# Patient Record
Sex: MALE | Race: OTHER | HISPANIC OR LATINO | URBAN - METROPOLITAN AREA
[De-identification: names, ages, dates, MRNs, and addresses within clinical notes are randomized per-mention and may not be internally consistent; named-entity substitution may affect disease eponyms.]

---

## 2016-03-23 RX ORDER — CHOLECALCIFEROL (VITAMIN D3) 125 MCG
1000 CAPSULE ORAL
Qty: 0 | Refills: 0 | DISCHARGE
Start: 2016-03-23

## 2017-02-13 ENCOUNTER — OUTPATIENT (OUTPATIENT)
Dept: OUTPATIENT SERVICES | Facility: HOSPITAL | Age: 82
LOS: 1 days | Discharge: HOME | End: 2017-02-13

## 2017-02-13 DIAGNOSIS — Z98.89 OTHER SPECIFIED POSTPROCEDURAL STATES: Chronic | ICD-10-CM

## 2017-02-13 DIAGNOSIS — C85.90 NON-HODGKIN LYMPHOMA, UNSPECIFIED, UNSPECIFIED SITE: ICD-10-CM

## 2017-02-14 ENCOUNTER — APPOINTMENT (OUTPATIENT)
Dept: HEMATOLOGY ONCOLOGY | Facility: CLINIC | Age: 82
End: 2017-02-14

## 2017-03-01 ENCOUNTER — APPOINTMENT (OUTPATIENT)
Dept: PULMONOLOGY | Facility: CLINIC | Age: 82
End: 2017-03-01

## 2017-03-10 ENCOUNTER — OUTPATIENT (OUTPATIENT)
Dept: OUTPATIENT SERVICES | Facility: HOSPITAL | Age: 82
LOS: 1 days | Discharge: HOME | End: 2017-03-10

## 2017-03-10 DIAGNOSIS — Z98.89 OTHER SPECIFIED POSTPROCEDURAL STATES: Chronic | ICD-10-CM

## 2017-03-10 DIAGNOSIS — C85.90 NON-HODGKIN LYMPHOMA, UNSPECIFIED, UNSPECIFIED SITE: ICD-10-CM

## 2017-03-31 ENCOUNTER — APPOINTMENT (OUTPATIENT)
Dept: PULMONOLOGY | Facility: CLINIC | Age: 82
End: 2017-03-31

## 2017-03-31 VITALS
OXYGEN SATURATION: 97 % | BODY MASS INDEX: 36.14 KG/M2 | WEIGHT: 204 LBS | SYSTOLIC BLOOD PRESSURE: 124 MMHG | HEIGHT: 63 IN | HEART RATE: 66 BPM | DIASTOLIC BLOOD PRESSURE: 60 MMHG

## 2017-03-31 DIAGNOSIS — E66.9 OBESITY, UNSPECIFIED: ICD-10-CM

## 2017-04-07 ENCOUNTER — OUTPATIENT (OUTPATIENT)
Dept: OUTPATIENT SERVICES | Facility: HOSPITAL | Age: 82
LOS: 1 days | Discharge: HOME | End: 2017-04-07

## 2017-04-07 DIAGNOSIS — C85.10 UNSPECIFIED B-CELL LYMPHOMA, UNSPECIFIED SITE: ICD-10-CM

## 2017-04-07 DIAGNOSIS — C85.90 NON-HODGKIN LYMPHOMA, UNSPECIFIED, UNSPECIFIED SITE: ICD-10-CM

## 2017-04-07 DIAGNOSIS — Z98.89 OTHER SPECIFIED POSTPROCEDURAL STATES: Chronic | ICD-10-CM

## 2017-04-11 ENCOUNTER — RESULT REVIEW (OUTPATIENT)
Age: 82
End: 2017-04-11

## 2017-04-12 ENCOUNTER — APPOINTMENT (OUTPATIENT)
Dept: HEMATOLOGY ONCOLOGY | Facility: CLINIC | Age: 82
End: 2017-04-12

## 2017-04-12 VITALS
WEIGHT: 202.38 LBS | OXYGEN SATURATION: 98 % | BODY MASS INDEX: 35.85 KG/M2 | DIASTOLIC BLOOD PRESSURE: 63 MMHG | HEART RATE: 72 BPM | SYSTOLIC BLOOD PRESSURE: 121 MMHG | TEMPERATURE: 97.9 F | RESPIRATION RATE: 16 BRPM

## 2017-04-12 LAB
HCT VFR BLD CALC: 27.9 % — LOW (ref 39–50)
HGB BLD-MCNC: 9.1 G/DL — LOW (ref 13–17)
MCHC RBC-ENTMCNC: 28.9 PG — SIGNIFICANT CHANGE UP (ref 27–34)
MCHC RBC-ENTMCNC: 32.5 G/DL — SIGNIFICANT CHANGE UP (ref 32–36)
MCV RBC AUTO: 89 FL — SIGNIFICANT CHANGE UP (ref 80–100)
PLATELET # BLD AUTO: 241 K/UL — SIGNIFICANT CHANGE UP (ref 150–400)
RBC # BLD: 3.14 M/UL — LOW (ref 4.2–5.8)
RBC # FLD: 15.6 % — HIGH (ref 10.3–14.5)
WBC # BLD: 8.4 K/UL — SIGNIFICANT CHANGE UP (ref 3.8–10.5)
WBC # FLD AUTO: 8.4 K/UL — SIGNIFICANT CHANGE UP (ref 3.8–10.5)

## 2017-06-10 ENCOUNTER — OUTPATIENT (OUTPATIENT)
Dept: OUTPATIENT SERVICES | Facility: HOSPITAL | Age: 82
LOS: 1 days | End: 2017-06-10
Payer: COMMERCIAL

## 2017-06-10 DIAGNOSIS — G47.33 OBSTRUCTIVE SLEEP APNEA (ADULT) (PEDIATRIC): ICD-10-CM

## 2017-06-10 DIAGNOSIS — Z98.89 OTHER SPECIFIED POSTPROCEDURAL STATES: Chronic | ICD-10-CM

## 2017-06-10 PROCEDURE — 95810 POLYSOM 6/> YRS 4/> PARAM: CPT | Mod: 26

## 2017-06-10 PROCEDURE — 95810 POLYSOM 6/> YRS 4/> PARAM: CPT

## 2017-07-07 ENCOUNTER — OUTPATIENT (OUTPATIENT)
Dept: OUTPATIENT SERVICES | Facility: HOSPITAL | Age: 82
LOS: 1 days | Discharge: HOME | End: 2017-07-07

## 2017-07-07 DIAGNOSIS — C85.90 NON-HODGKIN LYMPHOMA, UNSPECIFIED, UNSPECIFIED SITE: ICD-10-CM

## 2017-07-07 DIAGNOSIS — Z98.89 OTHER SPECIFIED POSTPROCEDURAL STATES: Chronic | ICD-10-CM

## 2017-07-12 ENCOUNTER — APPOINTMENT (OUTPATIENT)
Dept: HEMATOLOGY ONCOLOGY | Facility: CLINIC | Age: 82
End: 2017-07-12

## 2017-07-12 ENCOUNTER — RESULT REVIEW (OUTPATIENT)
Age: 82
End: 2017-07-12

## 2017-07-12 VITALS
HEART RATE: 67 BPM | HEIGHT: 62.99 IN | RESPIRATION RATE: 16 BRPM | OXYGEN SATURATION: 100 % | WEIGHT: 198.42 LBS | SYSTOLIC BLOOD PRESSURE: 153 MMHG | TEMPERATURE: 97.7 F | BODY MASS INDEX: 35.16 KG/M2 | DIASTOLIC BLOOD PRESSURE: 61 MMHG

## 2017-07-12 LAB
ALBUMIN SERPL ELPH-MCNC: 3.7 G/DL
ALP BLD-CCNC: 48 U/L
ALT SERPL-CCNC: 12 U/L
ANION GAP SERPL CALC-SCNC: 14 MMOL/L
AST SERPL-CCNC: 16 U/L
BILIRUB SERPL-MCNC: 0.2 MG/DL
BUN SERPL-MCNC: 42 MG/DL
CALCIUM SERPL-MCNC: 9.3 MG/DL
CHLORIDE SERPL-SCNC: 107 MMOL/L
CHOLEST SERPL-MCNC: 167 MG/DL
CHOLEST/HDLC SERPL: 11.1 RATIO
CO2 SERPL-SCNC: 23 MMOL/L
CREAT SERPL-MCNC: 2.32 MG/DL
GLUCOSE SERPL-MCNC: 165 MG/DL
HCT VFR BLD CALC: 31.1 % — LOW (ref 39–50)
HDLC SERPL-MCNC: 15 MG/DL
HGB BLD-MCNC: 10.1 G/DL — LOW (ref 13–17)
LDLC SERPL CALC-MCNC: 83 MG/DL
MCHC RBC-ENTMCNC: 29.2 PG — SIGNIFICANT CHANGE UP (ref 27–34)
MCHC RBC-ENTMCNC: 32.5 G/DL — SIGNIFICANT CHANGE UP (ref 32–36)
MCV RBC AUTO: 90 FL — SIGNIFICANT CHANGE UP (ref 80–100)
PLATELET # BLD AUTO: 210 K/UL — SIGNIFICANT CHANGE UP (ref 150–400)
POTASSIUM SERPL-SCNC: 4.8 MMOL/L
PROT SERPL-MCNC: 6.6 G/DL
RBC # BLD: 3.46 M/UL — LOW (ref 4.2–5.8)
RBC # FLD: 16 % — HIGH (ref 10.3–14.5)
SODIUM SERPL-SCNC: 144 MMOL/L
TRIGL SERPL-MCNC: 344 MG/DL
WBC # BLD: 4.2 K/UL — SIGNIFICANT CHANGE UP (ref 3.8–10.5)
WBC # FLD AUTO: 4.2 K/UL — SIGNIFICANT CHANGE UP (ref 3.8–10.5)

## 2017-07-12 RX ORDER — APIXABAN 5 MG/1
5 TABLET, FILM COATED ORAL
Qty: 180 | Refills: 0 | Status: DISCONTINUED | COMMUNITY
Start: 2017-01-13

## 2017-10-16 ENCOUNTER — OUTPATIENT (OUTPATIENT)
Dept: OUTPATIENT SERVICES | Facility: HOSPITAL | Age: 82
LOS: 1 days | Discharge: HOME | End: 2017-10-16

## 2017-10-16 DIAGNOSIS — C85.90 NON-HODGKIN LYMPHOMA, UNSPECIFIED, UNSPECIFIED SITE: ICD-10-CM

## 2017-10-16 DIAGNOSIS — Z98.89 OTHER SPECIFIED POSTPROCEDURAL STATES: Chronic | ICD-10-CM

## 2017-10-20 ENCOUNTER — APPOINTMENT (OUTPATIENT)
Dept: HEMATOLOGY ONCOLOGY | Facility: CLINIC | Age: 82
End: 2017-10-20
Payer: MEDICARE

## 2017-11-10 ENCOUNTER — APPOINTMENT (OUTPATIENT)
Dept: HEMATOLOGY ONCOLOGY | Facility: CLINIC | Age: 82
End: 2017-11-10
Payer: MEDICARE

## 2017-11-10 ENCOUNTER — APPOINTMENT (OUTPATIENT)
Dept: SURGICAL ONCOLOGY | Facility: CLINIC | Age: 82
End: 2017-11-10
Payer: MEDICARE

## 2017-11-10 ENCOUNTER — RESULT REVIEW (OUTPATIENT)
Age: 82
End: 2017-11-10

## 2017-11-10 VITALS
DIASTOLIC BLOOD PRESSURE: 72 MMHG | BODY MASS INDEX: 37.11 KG/M2 | SYSTOLIC BLOOD PRESSURE: 140 MMHG | HEART RATE: 61 BPM | TEMPERATURE: 98 F | OXYGEN SATURATION: 99 % | RESPIRATION RATE: 16 BRPM | WEIGHT: 209.44 LBS

## 2017-11-10 LAB
HCT VFR BLD CALC: 28.3 % — LOW (ref 39–50)
HGB BLD-MCNC: 9.7 G/DL — LOW (ref 13–17)
MCHC RBC-ENTMCNC: 32 PG — SIGNIFICANT CHANGE UP (ref 27–34)
MCHC RBC-ENTMCNC: 34.2 G/DL — SIGNIFICANT CHANGE UP (ref 32–36)
MCV RBC AUTO: 93.6 FL — SIGNIFICANT CHANGE UP (ref 80–100)
PLATELET # BLD AUTO: 195 K/UL — SIGNIFICANT CHANGE UP (ref 150–400)
RBC # BLD: 3.03 M/UL — LOW (ref 4.2–5.8)
RBC # FLD: 14.7 % — HIGH (ref 10.3–14.5)
WBC # BLD: 4.7 K/UL — SIGNIFICANT CHANGE UP (ref 3.8–10.5)
WBC # FLD AUTO: 4.7 K/UL — SIGNIFICANT CHANGE UP (ref 3.8–10.5)

## 2017-11-10 PROCEDURE — 99215 OFFICE O/P EST HI 40 MIN: CPT

## 2017-11-10 PROCEDURE — 99205 OFFICE O/P NEW HI 60 MIN: CPT

## 2017-11-13 LAB
ALBUMIN SERPL ELPH-MCNC: 3.6 G/DL
ALP BLD-CCNC: 39 U/L
ALT SERPL-CCNC: 15 U/L
ANION GAP SERPL CALC-SCNC: 11 MMOL/L
AST SERPL-CCNC: 16 U/L
BILIRUB SERPL-MCNC: 0.2 MG/DL
BUN SERPL-MCNC: 64 MG/DL
CALCIUM SERPL-MCNC: 9.1 MG/DL
CHLORIDE SERPL-SCNC: 109 MMOL/L
CHOLEST SERPL-MCNC: 189 MG/DL
CHOLEST/HDLC SERPL: 16 RATIO
CO2 SERPL-SCNC: 23 MMOL/L
CREAT SERPL-MCNC: 3.01 MG/DL
GLUCOSE SERPL-MCNC: 165 MG/DL
HDLC SERPL-MCNC: 12 MG/DL
LDH SERPL-CCNC: 165 U/L
LDLC SERPL CALC-MCNC: 102 MG/DL
POTASSIUM SERPL-SCNC: 5.6 MMOL/L
PROT SERPL-MCNC: 6.3 G/DL
SODIUM SERPL-SCNC: 143 MMOL/L
TRIGL SERPL-MCNC: 374 MG/DL

## 2017-11-14 ENCOUNTER — LABORATORY RESULT (OUTPATIENT)
Age: 82
End: 2017-11-14

## 2017-11-15 ENCOUNTER — APPOINTMENT (OUTPATIENT)
Dept: SURGICAL ONCOLOGY | Facility: CLINIC | Age: 82
End: 2017-11-15
Payer: MEDICARE

## 2017-11-15 VITALS
SYSTOLIC BLOOD PRESSURE: 178 MMHG | WEIGHT: 209 LBS | HEIGHT: 62 IN | BODY MASS INDEX: 38.46 KG/M2 | DIASTOLIC BLOOD PRESSURE: 84 MMHG | RESPIRATION RATE: 16 BRPM | HEART RATE: 60 BPM

## 2017-11-15 PROCEDURE — 11306 SHAVE SKIN LESION 0.6-1.0 CM: CPT

## 2017-11-17 ENCOUNTER — LABORATORY RESULT (OUTPATIENT)
Age: 82
End: 2017-11-17

## 2017-12-11 ENCOUNTER — OUTPATIENT (OUTPATIENT)
Dept: OUTPATIENT SERVICES | Facility: HOSPITAL | Age: 82
LOS: 1 days | Discharge: HOME | End: 2017-12-11

## 2017-12-11 DIAGNOSIS — C85.90 NON-HODGKIN LYMPHOMA, UNSPECIFIED, UNSPECIFIED SITE: ICD-10-CM

## 2017-12-11 DIAGNOSIS — Z98.89 OTHER SPECIFIED POSTPROCEDURAL STATES: Chronic | ICD-10-CM

## 2017-12-15 ENCOUNTER — APPOINTMENT (OUTPATIENT)
Dept: HEMATOLOGY ONCOLOGY | Facility: CLINIC | Age: 82
End: 2017-12-15

## 2018-01-10 ENCOUNTER — APPOINTMENT (OUTPATIENT)
Dept: SURGICAL ONCOLOGY | Facility: CLINIC | Age: 83
End: 2018-01-10
Payer: MEDICARE

## 2018-01-10 ENCOUNTER — APPOINTMENT (OUTPATIENT)
Dept: HEMATOLOGY ONCOLOGY | Facility: CLINIC | Age: 83
End: 2018-01-10
Payer: MEDICARE

## 2018-01-10 ENCOUNTER — RESULT REVIEW (OUTPATIENT)
Age: 83
End: 2018-01-10

## 2018-01-10 VITALS
OXYGEN SATURATION: 99 % | DIASTOLIC BLOOD PRESSURE: 85 MMHG | RESPIRATION RATE: 16 BRPM | TEMPERATURE: 97.7 F | BODY MASS INDEX: 36.58 KG/M2 | HEART RATE: 64 BPM | SYSTOLIC BLOOD PRESSURE: 167 MMHG | WEIGHT: 200 LBS

## 2018-01-10 VITALS
HEART RATE: 62 BPM | OXYGEN SATURATION: 99 % | TEMPERATURE: 97.7 F | BODY MASS INDEX: 33.32 KG/M2 | DIASTOLIC BLOOD PRESSURE: 71 MMHG | SYSTOLIC BLOOD PRESSURE: 162 MMHG | HEIGHT: 65 IN | WEIGHT: 200 LBS

## 2018-01-10 LAB
ALBUMIN SERPL ELPH-MCNC: 3.7 G/DL
ALP BLD-CCNC: 54 U/L
ALT SERPL-CCNC: 16 U/L
ANION GAP SERPL CALC-SCNC: 16 MMOL/L
AST SERPL-CCNC: 14 U/L
BILIRUB SERPL-MCNC: 0.2 MG/DL
BUN SERPL-MCNC: 47 MG/DL
CALCIUM SERPL-MCNC: 9.4 MG/DL
CHLORIDE SERPL-SCNC: 107 MMOL/L
CHOLEST SERPL-MCNC: 186 MG/DL
CHOLEST/HDLC SERPL: 5 RATIO
CO2 SERPL-SCNC: 24 MMOL/L
CREAT SERPL-MCNC: 2.75 MG/DL
GLUCOSE SERPL-MCNC: 170 MG/DL
HCT VFR BLD CALC: 29.8 % — LOW (ref 39–50)
HDLC SERPL-MCNC: 37 MG/DL
HGB BLD-MCNC: 10.2 G/DL — LOW (ref 13–17)
LDH SERPL-CCNC: 180 U/L
LDLC SERPL CALC-MCNC: 121 MG/DL
MCHC RBC-ENTMCNC: 31.8 PG — SIGNIFICANT CHANGE UP (ref 27–34)
MCHC RBC-ENTMCNC: 34.2 G/DL — SIGNIFICANT CHANGE UP (ref 32–36)
MCV RBC AUTO: 93 FL — SIGNIFICANT CHANGE UP (ref 80–100)
PLATELET # BLD AUTO: 198 K/UL — SIGNIFICANT CHANGE UP (ref 150–400)
POTASSIUM SERPL-SCNC: 5.8 MMOL/L
PROT SERPL-MCNC: 6.3 G/DL
RBC # BLD: 3.21 M/UL — LOW (ref 4.2–5.8)
RBC # FLD: 13.8 % — SIGNIFICANT CHANGE UP (ref 10.3–14.5)
SODIUM SERPL-SCNC: 147 MMOL/L
TRIGL SERPL-MCNC: 141 MG/DL
WBC # BLD: 8.6 K/UL — SIGNIFICANT CHANGE UP (ref 3.8–10.5)
WBC # FLD AUTO: 8.6 K/UL — SIGNIFICANT CHANGE UP (ref 3.8–10.5)

## 2018-01-10 PROCEDURE — 99215 OFFICE O/P EST HI 40 MIN: CPT

## 2018-01-10 PROCEDURE — 99213 OFFICE O/P EST LOW 20 MIN: CPT

## 2018-01-10 RX ORDER — FERROUS SULFATE 325(65) MG
325 (65 FE) TABLET ORAL
Refills: 0 | Status: DISCONTINUED | COMMUNITY
End: 2018-01-10

## 2018-01-10 RX ORDER — METHYLPREDNISOLONE 4 MG/1
4 TABLET ORAL
Qty: 21 | Refills: 0 | Status: DISCONTINUED | COMMUNITY
Start: 2017-12-09

## 2018-01-10 RX ORDER — CEFPODOXIME PROXETIL 200 MG/1
200 TABLET, FILM COATED ORAL
Qty: 10 | Refills: 0 | Status: DISCONTINUED | COMMUNITY
Start: 2017-12-09

## 2018-01-10 RX ORDER — CLARITHROMYCIN 250 MG/1
250 TABLET, FILM COATED ORAL
Qty: 14 | Refills: 0 | Status: DISCONTINUED | COMMUNITY
Start: 2017-12-09

## 2018-01-10 RX ORDER — HYDROCORTISONE 2.5% 25 MG/G
2.5 CREAM TOPICAL
Qty: 30 | Refills: 0 | Status: DISCONTINUED | COMMUNITY
Start: 2017-10-27

## 2018-01-22 ENCOUNTER — OUTPATIENT (OUTPATIENT)
Dept: OUTPATIENT SERVICES | Facility: HOSPITAL | Age: 83
LOS: 1 days | End: 2018-01-22
Payer: COMMERCIAL

## 2018-01-22 DIAGNOSIS — C44.329 SQUAMOUS CELL CARCINOMA OF SKIN OF OTHER PARTS OF FACE: ICD-10-CM

## 2018-01-22 DIAGNOSIS — I10 ESSENTIAL (PRIMARY) HYPERTENSION: ICD-10-CM

## 2018-01-22 DIAGNOSIS — Z01.818 ENCOUNTER FOR OTHER PREPROCEDURAL EXAMINATION: ICD-10-CM

## 2018-01-22 DIAGNOSIS — Z98.89 OTHER SPECIFIED POSTPROCEDURAL STATES: Chronic | ICD-10-CM

## 2018-01-22 PROCEDURE — 93010 ELECTROCARDIOGRAM REPORT: CPT | Mod: NC

## 2018-01-22 PROCEDURE — 85027 COMPLETE CBC AUTOMATED: CPT

## 2018-01-22 PROCEDURE — G0463: CPT

## 2018-01-22 PROCEDURE — 36415 COLL VENOUS BLD VENIPUNCTURE: CPT

## 2018-01-22 PROCEDURE — 80048 BASIC METABOLIC PNL TOTAL CA: CPT

## 2018-01-22 PROCEDURE — 93005 ELECTROCARDIOGRAM TRACING: CPT

## 2018-01-23 ENCOUNTER — APPOINTMENT (OUTPATIENT)
Dept: CARDIOLOGY | Facility: CLINIC | Age: 83
End: 2018-01-23

## 2018-01-24 ENCOUNTER — APPOINTMENT (OUTPATIENT)
Dept: CARDIOLOGY | Facility: CLINIC | Age: 83
End: 2018-01-24

## 2018-01-25 ENCOUNTER — APPOINTMENT (OUTPATIENT)
Dept: CARDIOLOGY | Facility: CLINIC | Age: 83
End: 2018-01-25

## 2018-01-26 ENCOUNTER — NON-APPOINTMENT (OUTPATIENT)
Age: 83
End: 2018-01-26

## 2018-01-26 ENCOUNTER — APPOINTMENT (OUTPATIENT)
Dept: CARDIOLOGY | Facility: CLINIC | Age: 83
End: 2018-01-26
Payer: MEDICARE

## 2018-01-26 ENCOUNTER — OUTPATIENT (OUTPATIENT)
Dept: OUTPATIENT SERVICES | Facility: HOSPITAL | Age: 83
LOS: 1 days | End: 2018-01-26
Payer: COMMERCIAL

## 2018-01-26 VITALS
HEART RATE: 55 BPM | OXYGEN SATURATION: 98 % | DIASTOLIC BLOOD PRESSURE: 64 MMHG | HEIGHT: 65 IN | SYSTOLIC BLOOD PRESSURE: 138 MMHG

## 2018-01-26 DIAGNOSIS — Z98.89 OTHER SPECIFIED POSTPROCEDURAL STATES: Chronic | ICD-10-CM

## 2018-01-26 DIAGNOSIS — I35.0 NONRHEUMATIC AORTIC (VALVE) STENOSIS: ICD-10-CM

## 2018-01-26 PROCEDURE — 93306 TTE W/DOPPLER COMPLETE: CPT | Mod: 26

## 2018-01-26 PROCEDURE — 99204 OFFICE O/P NEW MOD 45 MIN: CPT | Mod: 25,Q5

## 2018-01-26 PROCEDURE — 93000 ELECTROCARDIOGRAM COMPLETE: CPT

## 2018-01-26 PROCEDURE — 93306 TTE W/DOPPLER COMPLETE: CPT

## 2018-01-26 RX ORDER — PANTOPRAZOLE 40 MG/1
40 TABLET, DELAYED RELEASE ORAL
Refills: 0 | Status: DISCONTINUED | COMMUNITY
End: 2018-01-26

## 2018-01-30 ENCOUNTER — APPOINTMENT (OUTPATIENT)
Dept: SURGICAL ONCOLOGY | Facility: HOSPITAL | Age: 83
End: 2018-01-30

## 2018-01-30 ENCOUNTER — TRANSCRIPTION ENCOUNTER (OUTPATIENT)
Age: 83
End: 2018-01-30

## 2018-01-30 ENCOUNTER — OUTPATIENT (OUTPATIENT)
Dept: OUTPATIENT SERVICES | Facility: HOSPITAL | Age: 83
LOS: 1 days | End: 2018-01-30
Payer: COMMERCIAL

## 2018-01-30 ENCOUNTER — RESULT REVIEW (OUTPATIENT)
Age: 83
End: 2018-01-30

## 2018-01-30 ENCOUNTER — APPOINTMENT (OUTPATIENT)
Dept: CARDIOLOGY | Facility: CLINIC | Age: 83
End: 2018-01-30

## 2018-01-30 DIAGNOSIS — C44.329 SQUAMOUS CELL CARCINOMA OF SKIN OF OTHER PARTS OF FACE: ICD-10-CM

## 2018-01-30 DIAGNOSIS — Z98.89 OTHER SPECIFIED POSTPROCEDURAL STATES: Chronic | ICD-10-CM

## 2018-01-30 PROCEDURE — 21016 RESECT FACE/SCALP TUM 2 CM/>: CPT

## 2018-01-30 PROCEDURE — 11310 SHAVE SKIN LESION 0.5 CM/<: CPT | Mod: 59

## 2018-01-30 PROCEDURE — 11100: CPT | Mod: XS

## 2018-01-30 PROCEDURE — 88305 TISSUE EXAM BY PATHOLOGIST: CPT

## 2018-01-30 PROCEDURE — 15200 FTH/GFT FR TRNK 20 SQ CM/<: CPT

## 2018-01-30 PROCEDURE — 11101: CPT

## 2018-01-30 PROCEDURE — 14041 TIS TRNFR F/C/C/M/N/A/G/H/F: CPT

## 2018-01-30 PROCEDURE — 88305 TISSUE EXAM BY PATHOLOGIST: CPT | Mod: 26

## 2018-01-30 PROCEDURE — 88331 PATH CONSLTJ SURG 1 BLK 1SPC: CPT | Mod: 26

## 2018-01-30 PROCEDURE — 15201 FTH/GFT FR TRNK EACH ADDL: CPT

## 2018-01-30 PROCEDURE — 21016 RESECT FACE/SCALP TUM 2 CM/>: CPT | Mod: AS

## 2018-01-30 PROCEDURE — 88331 PATH CONSLTJ SURG 1 BLK 1SPC: CPT

## 2018-01-30 PROCEDURE — 11310 SHAVE SKIN LESION 0.5 CM/<: CPT | Mod: AS,59

## 2018-01-31 ENCOUNTER — TRANSCRIPTION ENCOUNTER (OUTPATIENT)
Age: 83
End: 2018-01-31

## 2018-02-09 ENCOUNTER — APPOINTMENT (OUTPATIENT)
Dept: SURGICAL ONCOLOGY | Facility: CLINIC | Age: 83
End: 2018-02-09
Payer: MEDICARE

## 2018-02-09 VITALS
RESPIRATION RATE: 15 BRPM | HEIGHT: 65 IN | OXYGEN SATURATION: 97 % | BODY MASS INDEX: 33.32 KG/M2 | DIASTOLIC BLOOD PRESSURE: 66 MMHG | SYSTOLIC BLOOD PRESSURE: 168 MMHG | WEIGHT: 200 LBS | HEART RATE: 53 BPM

## 2018-02-09 PROCEDURE — 99024 POSTOP FOLLOW-UP VISIT: CPT

## 2018-02-15 ENCOUNTER — APPOINTMENT (OUTPATIENT)
Dept: CARDIOLOGY | Facility: CLINIC | Age: 83
End: 2018-02-15

## 2018-03-07 ENCOUNTER — APPOINTMENT (OUTPATIENT)
Dept: CARDIOLOGY | Facility: CLINIC | Age: 83
End: 2018-03-07
Payer: MEDICARE

## 2018-03-07 ENCOUNTER — NON-APPOINTMENT (OUTPATIENT)
Age: 83
End: 2018-03-07

## 2018-03-07 VITALS
HEART RATE: 51 BPM | OXYGEN SATURATION: 97 % | WEIGHT: 205 LBS | SYSTOLIC BLOOD PRESSURE: 195 MMHG | DIASTOLIC BLOOD PRESSURE: 80 MMHG | BODY MASS INDEX: 34.11 KG/M2

## 2018-03-07 PROCEDURE — 99214 OFFICE O/P EST MOD 30 MIN: CPT | Mod: 25

## 2018-03-07 PROCEDURE — 93000 ELECTROCARDIOGRAM COMPLETE: CPT

## 2018-03-30 ENCOUNTER — APPOINTMENT (OUTPATIENT)
Dept: CARDIOLOGY | Facility: CLINIC | Age: 83
End: 2018-03-30

## 2018-04-03 ENCOUNTER — OUTPATIENT (OUTPATIENT)
Dept: OUTPATIENT SERVICES | Facility: HOSPITAL | Age: 83
LOS: 1 days | End: 2018-04-03
Payer: COMMERCIAL

## 2018-04-03 DIAGNOSIS — Z98.89 OTHER SPECIFIED POSTPROCEDURAL STATES: Chronic | ICD-10-CM

## 2018-04-03 DIAGNOSIS — I44.7 LEFT BUNDLE-BRANCH BLOCK, UNSPECIFIED: ICD-10-CM

## 2018-04-03 PROCEDURE — 93017 CV STRESS TEST TRACING ONLY: CPT

## 2018-04-03 PROCEDURE — 93018 CV STRESS TEST I&R ONLY: CPT

## 2018-04-03 PROCEDURE — 78452 HT MUSCLE IMAGE SPECT MULT: CPT

## 2018-04-03 PROCEDURE — 78452 HT MUSCLE IMAGE SPECT MULT: CPT | Mod: 26

## 2018-04-03 PROCEDURE — 93016 CV STRESS TEST SUPVJ ONLY: CPT

## 2018-04-03 PROCEDURE — A9500: CPT

## 2018-04-17 ENCOUNTER — MEDICATION RENEWAL (OUTPATIENT)
Age: 83
End: 2018-04-17

## 2018-05-11 ENCOUNTER — APPOINTMENT (OUTPATIENT)
Dept: SURGICAL ONCOLOGY | Facility: CLINIC | Age: 83
End: 2018-05-11
Payer: MEDICARE

## 2018-05-11 ENCOUNTER — MEDICATION RENEWAL (OUTPATIENT)
Age: 83
End: 2018-05-11

## 2018-05-11 VITALS
BODY MASS INDEX: 33.32 KG/M2 | DIASTOLIC BLOOD PRESSURE: 55 MMHG | OXYGEN SATURATION: 95 % | HEART RATE: 57 BPM | HEIGHT: 65 IN | WEIGHT: 200 LBS | SYSTOLIC BLOOD PRESSURE: 117 MMHG

## 2018-05-11 PROCEDURE — 99214 OFFICE O/P EST MOD 30 MIN: CPT

## 2018-09-12 ENCOUNTER — APPOINTMENT (OUTPATIENT)
Dept: CARDIOLOGY | Facility: CLINIC | Age: 83
End: 2018-09-12
Payer: MEDICARE

## 2018-09-12 ENCOUNTER — NON-APPOINTMENT (OUTPATIENT)
Age: 83
End: 2018-09-12

## 2018-09-12 VITALS
DIASTOLIC BLOOD PRESSURE: 70 MMHG | BODY MASS INDEX: 33.82 KG/M2 | OXYGEN SATURATION: 99 % | HEIGHT: 65 IN | SYSTOLIC BLOOD PRESSURE: 158 MMHG | HEART RATE: 73 BPM | WEIGHT: 203 LBS

## 2018-09-12 VITALS — SYSTOLIC BLOOD PRESSURE: 158 MMHG | DIASTOLIC BLOOD PRESSURE: 70 MMHG

## 2018-09-12 PROCEDURE — 99213 OFFICE O/P EST LOW 20 MIN: CPT | Mod: 25

## 2018-09-12 PROCEDURE — 93000 ELECTROCARDIOGRAM COMPLETE: CPT

## 2018-11-09 ENCOUNTER — APPOINTMENT (OUTPATIENT)
Dept: SURGICAL ONCOLOGY | Facility: CLINIC | Age: 83
End: 2018-11-09
Payer: MEDICARE

## 2018-11-09 VITALS
WEIGHT: 184 LBS | HEART RATE: 63 BPM | BODY MASS INDEX: 30.66 KG/M2 | DIASTOLIC BLOOD PRESSURE: 64 MMHG | HEIGHT: 65 IN | OXYGEN SATURATION: 100 % | SYSTOLIC BLOOD PRESSURE: 107 MMHG

## 2018-11-09 PROCEDURE — 99215 OFFICE O/P EST HI 40 MIN: CPT

## 2018-11-15 ENCOUNTER — OUTPATIENT (OUTPATIENT)
Dept: OUTPATIENT SERVICES | Facility: HOSPITAL | Age: 83
LOS: 1 days | Discharge: HOME | End: 2018-11-15

## 2018-11-15 DIAGNOSIS — Z98.89 OTHER SPECIFIED POSTPROCEDURAL STATES: Chronic | ICD-10-CM

## 2018-11-15 DIAGNOSIS — C85.90 NON-HODGKIN LYMPHOMA, UNSPECIFIED, UNSPECIFIED SITE: ICD-10-CM

## 2018-11-26 ENCOUNTER — APPOINTMENT (OUTPATIENT)
Dept: HEMATOLOGY ONCOLOGY | Facility: CLINIC | Age: 83
End: 2018-11-26
Payer: MEDICARE

## 2018-11-26 VITALS
OXYGEN SATURATION: 97 % | HEART RATE: 61 BPM | TEMPERATURE: 97.3 F | WEIGHT: 179.45 LBS | DIASTOLIC BLOOD PRESSURE: 63 MMHG | SYSTOLIC BLOOD PRESSURE: 100 MMHG | RESPIRATION RATE: 16 BRPM | BODY MASS INDEX: 29.86 KG/M2

## 2018-11-26 PROCEDURE — 99215 OFFICE O/P EST HI 40 MIN: CPT

## 2018-11-26 RX ORDER — ALLOPURINOL 100 MG/1
100 TABLET ORAL DAILY
Refills: 0 | Status: DISCONTINUED | COMMUNITY
End: 2018-11-26

## 2018-11-26 RX ORDER — LISINOPRIL 5 MG/1
5 TABLET ORAL
Qty: 90 | Refills: 1 | Status: DISCONTINUED | COMMUNITY
Start: 2017-09-18 | End: 2018-11-26

## 2018-11-26 RX ORDER — CALCITRIOL 0.25 UG/1
0.25 CAPSULE, LIQUID FILLED ORAL
Refills: 0 | Status: DISCONTINUED | COMMUNITY
End: 2018-11-26

## 2018-11-26 RX ORDER — GLIPIZIDE 2.5 MG/1
2.5 TABLET, FILM COATED, EXTENDED RELEASE ORAL
Refills: 0 | Status: DISCONTINUED | COMMUNITY
End: 2018-11-26

## 2018-11-26 NOTE — HISTORY OF PRESENT ILLNESS
[Disease:__________________________] : Disease: [unfilled] [Cardiovascular] : Cardiovascular [Constitutional] : Constitutional [ENT] : ENT [Dermatologic] : Dermatologic [Endocrine] : Endocrine [Gastrointestinal] : Gastrointestinal [Genitourinary] : Genitourinary [Gynecologic] : Gynecologic [Infectious] : Infectious [Musculoskeletal] : Musculoskeletal [Neurologic] : Neurologic [Pain] : Pain [Pulmonary] : Pulmonary [Hematologic] : Hematologic [Date: ____________] : Patient's last distress assessment performed on [unfilled]. [0 - No Distress] : Distress Level: 0 [de-identified] : First visit 04/28/2009; He had symptoms of a scaling rash over his extremities and his trunk for more than one year. He has a prior history of significant  type 2 diabetes mellitus, obesity, hypertension, hyperlipidemia and hypercholesterolemia. The patient is monitoring the cholesterol and the lip levels. The blood sugar has generally 120 or below.\par He has been compliant with his medications to control his active problems of type 2 diabetes, hypothyroidism, hypertension and hyperlipidemia [de-identified] : CD 3 + mycosis [FreeTextEntry1] : triamcinolone cream 0.5 and triamcinolone ointment 0.5/ Targretin [de-identified] : The patient was begun on dialysis one month ago due to renal failure due to diabetes and hypertension; He will have an A V fistula placed next week at [facility]; he receives dialysis at [facility] in [place] .\par He has a access port on the right chest.\par He has had follow up by Dr Moore for the squamous carcinoma of the skin; No chemotherapy required for that lesion.\par He had a transient ischemic attack in October 2018 and he was hospitalized at [facility]. He had edema and he had removal of 25 lbs of fluid. He remained in hospital 28 days

## 2018-11-26 NOTE — REVIEW OF SYSTEMS
[Fatigue] : fatigue [Recent Change In Weight] : ~T recent weight change [Negative] : Allergic/Immunologic [Dysuria] : no dysuria [Incontinence] : no incontinence [FreeTextEntry8] : he is making urin

## 2018-11-26 NOTE — ASSESSMENT
[Supportive] : Goals of care discussed with patient: Supportive [Palliative Care Plan] : not applicable at this time [FreeTextEntry1] : Singh Calix is a 83 year=ear old male with end stage renal disease now on dialysis for over one month. He has had better compliance with his medication; according to his son Singh who I have just met, he has not been in compliance with blood pressure medication and insulin for years. Family is now charting his blood sugar and his blood pressure daily.\par He has had follow up for the squamous cell carcinoma of the skin by Dr Moore. The disease appears controlled with no adenopathy.\par He was last seen by me in January 2018 and he did not keep his interval follow up. Dialysis continues and they plan for AV shunt healing maturing over 2 months. No blood testing today. Return in 4 months.

## 2018-11-26 NOTE — REASON FOR VISIT
[Follow-Up Visit] : a follow-up visit for [Lymphoma] : lymphoma [Family Member] : family member [Other: _____] : [unfilled]

## 2019-03-11 ENCOUNTER — APPOINTMENT (OUTPATIENT)
Dept: CARDIOLOGY | Facility: CLINIC | Age: 84
End: 2019-03-11

## 2019-03-12 ENCOUNTER — APPOINTMENT (OUTPATIENT)
Dept: CARDIOLOGY | Facility: CLINIC | Age: 84
End: 2019-03-12

## 2019-03-19 ENCOUNTER — OUTPATIENT (OUTPATIENT)
Dept: OUTPATIENT SERVICES | Facility: HOSPITAL | Age: 84
LOS: 1 days | Discharge: HOME | End: 2019-03-19

## 2019-03-19 DIAGNOSIS — Z98.89 OTHER SPECIFIED POSTPROCEDURAL STATES: Chronic | ICD-10-CM

## 2019-03-19 DIAGNOSIS — C85.90 NON-HODGKIN LYMPHOMA, UNSPECIFIED, UNSPECIFIED SITE: ICD-10-CM

## 2019-03-26 ENCOUNTER — RESULT REVIEW (OUTPATIENT)
Age: 84
End: 2019-03-26

## 2019-03-26 ENCOUNTER — APPOINTMENT (OUTPATIENT)
Dept: HEMATOLOGY ONCOLOGY | Facility: CLINIC | Age: 84
End: 2019-03-26
Payer: MEDICARE

## 2019-03-26 VITALS
TEMPERATURE: 97.7 F | OXYGEN SATURATION: 100 % | HEART RATE: 70 BPM | SYSTOLIC BLOOD PRESSURE: 126 MMHG | WEIGHT: 175.25 LBS | BODY MASS INDEX: 29.17 KG/M2 | DIASTOLIC BLOOD PRESSURE: 69 MMHG | RESPIRATION RATE: 16 BRPM

## 2019-03-26 LAB
BASOPHILS # BLD AUTO: 0 K/UL — SIGNIFICANT CHANGE UP (ref 0–0.2)
BASOPHILS NFR BLD AUTO: 0.1 % — SIGNIFICANT CHANGE UP (ref 0–2)
EOSINOPHIL # BLD AUTO: 0.1 K/UL — SIGNIFICANT CHANGE UP (ref 0–0.5)
EOSINOPHIL NFR BLD AUTO: 1.6 % — SIGNIFICANT CHANGE UP (ref 0–6)
HCT VFR BLD CALC: 36.9 % — LOW (ref 39–50)
HGB BLD-MCNC: 12.3 G/DL — LOW (ref 13–17)
LYMPHOCYTES # BLD AUTO: 1.4 K/UL — SIGNIFICANT CHANGE UP (ref 1–3.3)
LYMPHOCYTES # BLD AUTO: 18.7 % — SIGNIFICANT CHANGE UP (ref 13–44)
MCHC RBC-ENTMCNC: 33.2 G/DL — SIGNIFICANT CHANGE UP (ref 32–36)
MCHC RBC-ENTMCNC: 34.1 PG — HIGH (ref 27–34)
MCV RBC AUTO: 103 FL — HIGH (ref 80–100)
MONOCYTES # BLD AUTO: 0.4 K/UL — SIGNIFICANT CHANGE UP (ref 0–0.9)
MONOCYTES NFR BLD AUTO: 6 % — SIGNIFICANT CHANGE UP (ref 2–14)
NEUTROPHILS # BLD AUTO: 5.3 K/UL — SIGNIFICANT CHANGE UP (ref 1.8–7.4)
NEUTROPHILS NFR BLD AUTO: 73.6 % — SIGNIFICANT CHANGE UP (ref 43–77)
PLATELET # BLD AUTO: 181 K/UL — SIGNIFICANT CHANGE UP (ref 150–400)
RBC # BLD: 3.59 M/UL — LOW (ref 4.2–5.8)
RBC # FLD: 14.2 % — SIGNIFICANT CHANGE UP (ref 10.3–14.5)
WBC # BLD: 7.2 K/UL — SIGNIFICANT CHANGE UP (ref 3.8–10.5)
WBC # FLD AUTO: 7.2 K/UL — SIGNIFICANT CHANGE UP (ref 3.8–10.5)

## 2019-03-26 PROCEDURE — 99215 OFFICE O/P EST HI 40 MIN: CPT

## 2019-03-26 RX ORDER — METOLAZONE 2.5 MG/1
2.5 TABLET ORAL
Qty: 26 | Refills: 0 | Status: DISCONTINUED | COMMUNITY
Start: 2017-08-15 | End: 2019-03-26

## 2019-03-26 RX ORDER — BUMETANIDE 2 MG/1
2 TABLET ORAL DAILY
Refills: 0 | Status: DISCONTINUED | COMMUNITY
End: 2019-03-26

## 2019-03-26 RX ORDER — HYDRALAZINE HYDROCHLORIDE 25 MG/1
25 TABLET ORAL 3 TIMES DAILY
Refills: 0 | Status: DISCONTINUED | COMMUNITY
Start: 2016-08-13 | End: 2019-03-26

## 2019-03-26 RX ORDER — MIRABEGRON 25 MG/1
25 TABLET, FILM COATED, EXTENDED RELEASE ORAL
Refills: 0 | Status: DISCONTINUED | COMMUNITY
End: 2019-03-26

## 2019-03-26 NOTE — REASON FOR VISIT
[Follow-Up Visit] : a follow-up visit for [Blood Count Assessment] : blood count assessment [Lymphoma] : lymphoma [Family Member] : family member [Other: _____] : [unfilled]

## 2019-03-27 LAB
ALBUMIN SERPL ELPH-MCNC: 4 G/DL
ALP BLD-CCNC: 56 U/L
ALT SERPL-CCNC: 18 U/L
ANION GAP SERPL CALC-SCNC: 14 MMOL/L
AST SERPL-CCNC: 20 U/L
BILIRUB SERPL-MCNC: 0.5 MG/DL
BUN SERPL-MCNC: 43 MG/DL
CALCIUM SERPL-MCNC: 9 MG/DL
CHLORIDE SERPL-SCNC: 96 MMOL/L
CO2 SERPL-SCNC: 34 MMOL/L
CREAT SERPL-MCNC: 4.08 MG/DL
GLUCOSE SERPL-MCNC: 250 MG/DL
LDH SERPL-CCNC: 148 U/L
POTASSIUM SERPL-SCNC: 4.8 MMOL/L
PROT SERPL-MCNC: 6.4 G/DL
SODIUM SERPL-SCNC: 143 MMOL/L

## 2019-04-24 ENCOUNTER — APPOINTMENT (OUTPATIENT)
Dept: CARDIOLOGY | Facility: CLINIC | Age: 84
End: 2019-04-24

## 2019-04-26 DIAGNOSIS — E03.9 HYPOTHYROIDISM, UNSPECIFIED: ICD-10-CM

## 2019-04-26 RX ORDER — LEVOTHYROXINE SODIUM 0.07 MG/1
75 TABLET ORAL DAILY
Refills: 0 | Status: ACTIVE | COMMUNITY

## 2019-04-26 RX ORDER — PNV NO.95/FERROUS FUM/FOLIC AC 28MG-0.8MG
1000 TABLET ORAL
Refills: 0 | Status: ACTIVE | COMMUNITY

## 2019-04-29 ENCOUNTER — APPOINTMENT (OUTPATIENT)
Dept: CARDIOLOGY | Facility: CLINIC | Age: 84
End: 2019-04-29

## 2019-04-29 ENCOUNTER — APPOINTMENT (OUTPATIENT)
Dept: CARDIOLOGY | Facility: CLINIC | Age: 84
End: 2019-04-29
Payer: MEDICARE

## 2019-04-29 PROCEDURE — 93306 TTE W/DOPPLER COMPLETE: CPT

## 2019-05-10 ENCOUNTER — APPOINTMENT (OUTPATIENT)
Dept: SURGICAL ONCOLOGY | Facility: CLINIC | Age: 84
End: 2019-05-10

## 2019-05-14 NOTE — PHYSICAL EXAM
[Ambulatory and capable of all self care but unable to carry out any work activities] : Status 2- Ambulatory and capable of all self care but unable to carry out any work activities. Up and about more than 50% of waking hours [Obese] : obese [Normal] : affect appropriate [de-identified] : mild gynecomastia [de-identified] : Over all area of plaquing has diminished. Less erythema.generalized pallor over the trunk; significant regression of lesions. small areas of erythema over his scalp [de-identified] : saddened over loss of wife.

## 2019-05-14 NOTE — HISTORY OF PRESENT ILLNESS
[Disease:__________________________] : Disease: [unfilled] [Cardiovascular] : Cardiovascular [Constitutional] : Constitutional [ENT] : ENT [Dermatologic] : Dermatologic [Endocrine] : Endocrine [Gastrointestinal] : Gastrointestinal [Genitourinary] : Genitourinary [Gynecologic] : Gynecologic [Infectious] : Infectious [Musculoskeletal] : Musculoskeletal [Neurologic] : Neurologic [Pain] : Pain [Pulmonary] : Pulmonary [Hematologic] : Hematologic [Date: ____________] : Patient's last distress assessment performed on [unfilled]. [2 - Distress Level] : Distress Level: 2 [de-identified] : First visit 04/28/2009; He had symptoms of a scaling rash over his extremities and his trunk for more than one year. He has a prior history of significant  type 2 diabetes mellitus, obesity, hypertension, hyperlipidemia and hypercholesterolemia. The patient is monitoring the cholesterol and the lip levels. The blood sugar has generally 120 or below.\par He has been compliant with his medications to control his active problems of type 2 diabetes, hypothyroidism, hypertension and hyperlipidemia [de-identified] : CD 3 + mycosis [FreeTextEntry1] : triamcinolone cream 0.5 and triamcinolone ointment 0.5/ Targretin [de-identified] : The patient was begun on dialysis four months ago due to renal failure due to diabetes and hypertension; He will have an A V fistula placed in December left arm [facility]; he receives dialysis at [facility] in [place] .\par He has had follow up by Dr Moore for the squamous carcinoma of the skin in 2018. No chemotherapy required for that lesion.\par He had a transient ischemic attack in October 2018 and he was hospitalized at [facility]. He had edema and he had removal of 25 lbs of fluid. He remained in hospital 28 days. He has noted some lesion on the top of his scalp

## 2019-05-14 NOTE — ASSESSMENT
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 His functional activity has improved after TIA in October 2018.\par He s doing well and he remains on dialysis. He has been off Targretin for one month.The skin lesion have not worsened off Targretin. He still uses the triamcinolone cream. CBC is acceptable. He is not anemic. \par RTC 4 months.\par I have completed forms for the [employer] [facility]; forms will be faxed for Targretin therapy.

## 2019-07-22 ENCOUNTER — MEDICATION RENEWAL (OUTPATIENT)
Age: 84
End: 2019-07-22

## 2019-07-29 ENCOUNTER — OUTPATIENT (OUTPATIENT)
Dept: OUTPATIENT SERVICES | Facility: HOSPITAL | Age: 84
LOS: 1 days | Discharge: HOME | End: 2019-07-29

## 2019-07-29 DIAGNOSIS — Z98.89 OTHER SPECIFIED POSTPROCEDURAL STATES: Chronic | ICD-10-CM

## 2019-07-29 DIAGNOSIS — C85.90 NON-HODGKIN LYMPHOMA, UNSPECIFIED, UNSPECIFIED SITE: ICD-10-CM

## 2019-08-02 ENCOUNTER — RESULT REVIEW (OUTPATIENT)
Age: 84
End: 2019-08-02

## 2019-08-02 ENCOUNTER — APPOINTMENT (OUTPATIENT)
Dept: HEMATOLOGY ONCOLOGY | Facility: CLINIC | Age: 84
End: 2019-08-02
Payer: MEDICARE

## 2019-08-02 VITALS
TEMPERATURE: 97.6 F | DIASTOLIC BLOOD PRESSURE: 70 MMHG | WEIGHT: 178.55 LBS | HEART RATE: 73 BPM | RESPIRATION RATE: 14 BRPM | BODY MASS INDEX: 29.72 KG/M2 | OXYGEN SATURATION: 100 % | SYSTOLIC BLOOD PRESSURE: 125 MMHG

## 2019-08-02 DIAGNOSIS — Z95.5 PRESENCE OF CORONARY ANGIOPLASTY IMPLANT AND GRAFT: ICD-10-CM

## 2019-08-02 DIAGNOSIS — E78.5 HYPERLIPIDEMIA, UNSPECIFIED: ICD-10-CM

## 2019-08-02 DIAGNOSIS — I10 ESSENTIAL (PRIMARY) HYPERTENSION: ICD-10-CM

## 2019-08-02 LAB
ALBUMIN SERPL ELPH-MCNC: 3.8 G/DL
ALP BLD-CCNC: 48 U/L
ALT SERPL-CCNC: 20 U/L
ANION GAP SERPL CALC-SCNC: 20 MMOL/L
AST SERPL-CCNC: 20 U/L
BASOPHILS # BLD AUTO: 0 K/UL — SIGNIFICANT CHANGE UP (ref 0–0.2)
BASOPHILS NFR BLD AUTO: 0.2 % — SIGNIFICANT CHANGE UP (ref 0–2)
BILIRUB SERPL-MCNC: 0.3 MG/DL
BUN SERPL-MCNC: 62 MG/DL
CALCIUM SERPL-MCNC: 9 MG/DL
CHLORIDE SERPL-SCNC: 98 MMOL/L
CHOLEST SERPL-MCNC: 117 MG/DL
CHOLEST/HDLC SERPL: 4.2 RATIO
CO2 SERPL-SCNC: 24 MMOL/L
CREAT SERPL-MCNC: 5.44 MG/DL
EOSINOPHIL # BLD AUTO: 0.1 K/UL — SIGNIFICANT CHANGE UP (ref 0–0.5)
EOSINOPHIL NFR BLD AUTO: 1.8 % — SIGNIFICANT CHANGE UP (ref 0–6)
GLUCOSE SERPL-MCNC: 232 MG/DL
HCT VFR BLD CALC: 26.8 % — LOW (ref 39–50)
HDLC SERPL-MCNC: 28 MG/DL
HGB BLD-MCNC: 9.4 G/DL — LOW (ref 13–17)
LDLC SERPL CALC-MCNC: 52 MG/DL
LYMPHOCYTES # BLD AUTO: 1 K/UL — SIGNIFICANT CHANGE UP (ref 1–3.3)
LYMPHOCYTES # BLD AUTO: 11.7 % — LOW (ref 13–44)
MCHC RBC-ENTMCNC: 35.1 G/DL — SIGNIFICANT CHANGE UP (ref 32–36)
MCHC RBC-ENTMCNC: 38 PG — HIGH (ref 27–34)
MCV RBC AUTO: 109 FL — HIGH (ref 80–100)
MONOCYTES # BLD AUTO: 0.4 K/UL — SIGNIFICANT CHANGE UP (ref 0–0.9)
MONOCYTES NFR BLD AUTO: 4.9 % — SIGNIFICANT CHANGE UP (ref 2–14)
NEUTROPHILS # BLD AUTO: 6.7 K/UL — SIGNIFICANT CHANGE UP (ref 1.8–7.4)
NEUTROPHILS NFR BLD AUTO: 81.3 % — HIGH (ref 43–77)
PLATELET # BLD AUTO: 203 K/UL — SIGNIFICANT CHANGE UP (ref 150–400)
POTASSIUM SERPL-SCNC: 3.8 MMOL/L
PROT SERPL-MCNC: 6.2 G/DL
RBC # BLD: 2.47 M/UL — LOW (ref 4.2–5.8)
RBC # FLD: 14.8 % — HIGH (ref 10.3–14.5)
SODIUM SERPL-SCNC: 142 MMOL/L
TRIGL SERPL-MCNC: 183 MG/DL
WBC # BLD: 8.3 K/UL — SIGNIFICANT CHANGE UP (ref 3.8–10.5)
WBC # FLD AUTO: 8.3 K/UL — SIGNIFICANT CHANGE UP (ref 3.8–10.5)

## 2019-08-02 PROCEDURE — 99215 OFFICE O/P EST HI 40 MIN: CPT

## 2019-08-02 NOTE — HISTORY OF PRESENT ILLNESS
[Disease:__________________________] : Disease: [unfilled] [Cardiovascular] : Cardiovascular [Constitutional] : Constitutional [ENT] : ENT [Dermatologic] : Dermatologic [Endocrine] : Endocrine [Gastrointestinal] : Gastrointestinal [Gynecologic] : Gynecologic [Genitourinary] : Genitourinary [Infectious] : Infectious [Musculoskeletal] : Musculoskeletal [Neurologic] : Neurologic [Pain] : Pain [Hematologic] : Hematologic [Pulmonary] : Pulmonary [Date: ____________] : Patient's last distress assessment performed on [unfilled]. [0 - No Distress] : Distress Level: 0 [de-identified] : CD 3 + mycosis [de-identified] : First visit 04/28/2009; He had symptoms of a scaling rash over his extremities and his trunk for more than one year. He has a prior history of significant  type 2 diabetes mellitus, obesity, hypertension, hyperlipidemia and hypercholesterolemia. The patient is monitoring the cholesterol and the lip levels. The blood sugar has generally 120 or below.\par He has been compliant with his medications to control his active problems of type 2 diabetes, hypothyroidism, hypertension and hyperlipidemia [FreeTextEntry1] : currently on topical steroid and dialysis tree times per week [de-identified] : Since his visit in March he has not been able to obtain medication due to insurance and production issues. His last treatment was in February 2019. According to his family no worsening of skin lesions. He had a hospitalization for a v graft revision in March and a new one is planned for August. \par he is looking for cardiology evaluation; now on Plavix and aspirin. He had a Catherization of the heart in July and a stent was placed.

## 2019-08-02 NOTE — CONSULT LETTER
[Dear  ___] : Dear  [unfilled], [Courtesy Letter:] : I had the pleasure of seeing your patient, [unfilled], in my office today. [Sincerely,] : Sincerely, [FreeTextEntry2] : Ronn Moore MD\par Surgical Oncology \par [address] \par [place] [id number] [FreeTextEntry3] : Charli Ruvalcaba

## 2019-08-02 NOTE — ASSESSMENT
[Supportive] : Goals of care discussed with patient: Supportive [Palliative Care Plan] : not applicable at this time [FreeTextEntry1] : WILMER Davis has not taken bexarotene since February and had good control of his skin lesions while receiving topical triamcinolone 0/1 % and 0.5 % application. He is also receiving dialysis therapy.\par His daughter has been attempting to receive medication through a james for months but he apparently is not able to do this and there may be production issues with medication. In either event he has no obvious progression of disease off therapy. The daughter has asked me to hand write a prescription which I have performed today. I have electronically ordered the medication three times since March 2019.\par THere is a small scalp lesion to the right frontal area which has the appearance of a squamous tumor or a keratosis. I will ask him to see Dr Moore after he undergoes placement of a new AV shunt on the right arm as the left AV shunt is less functional. He has been having worsening issues with his coronary artery disease , diabetes and his anticoagulation now includes platelet blockade and direct anti X a medication.\par He has not had a bleeding diathesis. He will schedule an appointment with Dr Moore .\par RT C 4 months. renew triamcinolone cream\par \par

## 2019-08-02 NOTE — PHYSICAL EXAM
[Capable of only limited self care, confined to bed or chair more than 50% of waking hours] : Status 3- Capable of only limited self care, confined to bed or chair more than 50% of waking hours [Normal] : affect appropriate [de-identified] : no lesions of the trunk; mild erythema of the posterior thighs similar to prior exmiantion. Left scalp lesion 1.2  cm in size scale.

## 2019-08-02 NOTE — REASON FOR VISIT
[Follow-Up Visit] : a follow-up visit for [Lymphoma] : lymphoma [Blood Count Assessment] : blood count assessment [Other: _____] : [unfilled] [Family Member] : family member

## 2019-08-16 ENCOUNTER — OUTPATIENT (OUTPATIENT)
Dept: OUTPATIENT SERVICES | Facility: HOSPITAL | Age: 84
LOS: 1 days | End: 2019-08-16
Payer: MEDICARE

## 2019-08-16 VITALS — HEART RATE: 80 BPM | DIASTOLIC BLOOD PRESSURE: 64 MMHG | SYSTOLIC BLOOD PRESSURE: 127 MMHG

## 2019-08-16 VITALS — DIASTOLIC BLOOD PRESSURE: 60 MMHG | HEART RATE: 78 BPM | RESPIRATION RATE: 16 BRPM | SYSTOLIC BLOOD PRESSURE: 106 MMHG

## 2019-08-16 DIAGNOSIS — I12.0 HYPERTENSIVE CHRONIC KIDNEY DISEASE WITH STAGE 5 CHRONIC KIDNEY DISEASE OR END STAGE RENAL DISEASE: ICD-10-CM

## 2019-08-16 DIAGNOSIS — Z98.89 OTHER SPECIFIED POSTPROCEDURAL STATES: Chronic | ICD-10-CM

## 2019-08-16 LAB — BUN SERPL-MCNC: 11 MG/DL — SIGNIFICANT CHANGE UP (ref 7–23)

## 2019-08-16 PROCEDURE — 90935 HEMODIALYSIS ONE EVALUATION: CPT

## 2019-08-16 PROCEDURE — 86901 BLOOD TYPING SEROLOGIC RH(D): CPT

## 2019-08-16 PROCEDURE — 85027 COMPLETE CBC AUTOMATED: CPT

## 2019-08-16 PROCEDURE — 86850 RBC ANTIBODY SCREEN: CPT

## 2019-08-16 PROCEDURE — 86923 COMPATIBILITY TEST ELECTRIC: CPT

## 2019-08-16 PROCEDURE — 36430 TRANSFUSION BLD/BLD COMPNT: CPT

## 2019-08-16 PROCEDURE — 80074 ACUTE HEPATITIS PANEL: CPT

## 2019-08-16 PROCEDURE — 84520 ASSAY OF UREA NITROGEN: CPT

## 2019-08-16 PROCEDURE — 86900 BLOOD TYPING SEROLOGIC ABO: CPT

## 2019-08-16 PROCEDURE — P9016: CPT

## 2019-08-16 PROCEDURE — 80069 RENAL FUNCTION PANEL: CPT

## 2019-08-16 PROCEDURE — 36415 COLL VENOUS BLD VENIPUNCTURE: CPT

## 2019-08-16 NOTE — HEMODIALYSIS NURSING HISTORY - PMH
BPH (benign prostatic hyperplasia)    Cutaneous T-cell lymphoma    DM (diabetes mellitus)    HTN (hypertension)    Hypercholesteremia    Hypothyroid

## 2019-11-29 ENCOUNTER — OUTPATIENT (OUTPATIENT)
Dept: OUTPATIENT SERVICES | Facility: HOSPITAL | Age: 84
LOS: 1 days | Discharge: HOME | End: 2019-11-29

## 2019-11-29 DIAGNOSIS — C85.90 NON-HODGKIN LYMPHOMA, UNSPECIFIED, UNSPECIFIED SITE: ICD-10-CM

## 2019-11-29 DIAGNOSIS — Z98.89 OTHER SPECIFIED POSTPROCEDURAL STATES: Chronic | ICD-10-CM

## 2019-12-04 ENCOUNTER — RESULT REVIEW (OUTPATIENT)
Age: 84
End: 2019-12-04

## 2019-12-04 ENCOUNTER — APPOINTMENT (OUTPATIENT)
Dept: HEMATOLOGY ONCOLOGY | Facility: CLINIC | Age: 84
End: 2019-12-04
Payer: MEDICARE

## 2019-12-04 VITALS
HEART RATE: 100 BPM | DIASTOLIC BLOOD PRESSURE: 76 MMHG | BODY MASS INDEX: 29.06 KG/M2 | RESPIRATION RATE: 16 BRPM | WEIGHT: 174.58 LBS | TEMPERATURE: 98 F | SYSTOLIC BLOOD PRESSURE: 134 MMHG | OXYGEN SATURATION: 100 %

## 2019-12-04 DIAGNOSIS — I35.0 NONRHEUMATIC AORTIC (VALVE) STENOSIS: ICD-10-CM

## 2019-12-04 LAB
ALBUMIN SERPL ELPH-MCNC: 3.7 G/DL
ALP BLD-CCNC: 60 U/L
ALT SERPL-CCNC: 16 U/L
ANION GAP SERPL CALC-SCNC: 18 MMOL/L
AST SERPL-CCNC: 28 U/L
BILIRUB SERPL-MCNC: 0.3 MG/DL
BUN SERPL-MCNC: 47 MG/DL
CALCIUM SERPL-MCNC: 9.2 MG/DL
CHLORIDE SERPL-SCNC: 96 MMOL/L
CHOLEST SERPL-MCNC: 104 MG/DL
CHOLEST/HDLC SERPL: 3.4 RATIO
CO2 SERPL-SCNC: 26 MMOL/L
CREAT SERPL-MCNC: 5.41 MG/DL
GLUCOSE SERPL-MCNC: 154 MG/DL
HCT VFR BLD CALC: 26.3 % — LOW (ref 39–50)
HDLC SERPL-MCNC: 31 MG/DL
HGB BLD-MCNC: 8.8 G/DL — LOW (ref 13–17)
LDH SERPL-CCNC: 163 U/L
LDLC SERPL CALC-MCNC: 43 MG/DL
MCHC RBC-ENTMCNC: 33.5 G/DL — SIGNIFICANT CHANGE UP (ref 32–36)
MCHC RBC-ENTMCNC: 36.2 PG — HIGH (ref 27–34)
MCV RBC AUTO: 108 FL — HIGH (ref 80–100)
PLATELET # BLD AUTO: 210 K/UL — SIGNIFICANT CHANGE UP (ref 150–400)
POTASSIUM SERPL-SCNC: 4.7 MMOL/L
PROT SERPL-MCNC: 6.2 G/DL
RBC # BLD: 2.43 M/UL — LOW (ref 4.2–5.8)
RBC # FLD: 14.9 % — HIGH (ref 10.3–14.5)
SODIUM SERPL-SCNC: 140 MMOL/L
TRIGL SERPL-MCNC: 149 MG/DL
WBC # BLD: 7.8 K/UL — SIGNIFICANT CHANGE UP (ref 3.8–10.5)
WBC # FLD AUTO: 7.8 K/UL — SIGNIFICANT CHANGE UP (ref 3.8–10.5)

## 2019-12-04 PROCEDURE — 99215 OFFICE O/P EST HI 40 MIN: CPT

## 2019-12-04 RX ORDER — CARVEDILOL 12.5 MG/1
12.5 TABLET, FILM COATED ORAL TWICE DAILY
Refills: 0 | Status: DISCONTINUED | COMMUNITY
Start: 2019-08-02 | End: 2019-12-04

## 2019-12-04 RX ORDER — DILTIAZEM HYDROCHLORIDE 30 MG/1
30 TABLET ORAL
Qty: 270 | Refills: 0 | Status: DISCONTINUED | COMMUNITY
Start: 2016-08-13 | End: 2019-12-04

## 2019-12-04 RX ORDER — SEVELAMER CARBONATE 800 MG/1
0.8 POWDER, FOR SUSPENSION ORAL 3 TIMES DAILY
Refills: 0 | Status: ACTIVE | COMMUNITY
Start: 2019-12-04

## 2019-12-04 NOTE — REASON FOR VISIT
[Follow-Up Visit] : a follow-up visit for [Blood Count Assessment] : blood count assessment [Family Member] : family member [Lymphoma] : lymphoma [Other: _____] : [unfilled]

## 2019-12-04 NOTE — ASSESSMENT
[FreeTextEntry1] : Timothy Yogi Davis is a 84 year old male with complex medical problems including CHF, Stage 4 chronic renal disease, diabetes , hypertension, hyperlipidemia and numerous skin cancers including T cell cutaneous lymphoma, basal cell carcinoma and squamous cell carcinoma. The development of the skin cancers may relate to hiis immune suppression due to chronic medical illness. I would continue with steroid cream; he is now off Targretin for 10 months and no new skin lesion identified as T cell lymphoma. he will continue with steroid cream application. RTC 6 months. [Palliative Care Plan] : not applicable at this time

## 2019-12-04 NOTE — REVIEW OF SYSTEMS
[Fatigue] : fatigue [Vision Problems] : vision problems [Lower Ext Edema] : lower extremity edema [Shortness Of Breath] : shortness of breath [SOB on Exertion] : shortness of breath during exertion [Constipation] : constipation [Skin Rash] : skin rash [Easy Bleeding] : a tendency for easy bleeding [Easy Bruising] : a tendency for easy bruising [Chills] : no chills [Fever] : no fever [Night Sweats] : no night sweats [Recent Change In Weight] : ~T no recent weight change [Dysphagia] : no dysphagia [Eye Pain] : no eye pain [Red Eyes] : eyes not red [Nosebleeds] : no nosebleeds [Loss of Hearing] : no loss of hearing [Hoarseness] : no hoarseness [Odynophagia] : no odynophagia [Mucosal Pain] : no mucosal pain [Chest Pain] : no chest pain [Wheezing] : no wheezing [Leg Claudication] : no intermittent leg claudication [Cough] : no cough [Diarrhea] : no diarrhea [Vomiting] : no vomiting [Abdominal Pain] : no abdominal pain [Dysuria] : no dysuria [Incontinence] : no incontinence [Muscle Pain] : no muscle pain [Joint Pain] : no joint pain [Skin Wound] : no skin wound [Confused] : no confusion [Dizziness] : no dizziness [Fainting] : no fainting [Difficulty Walking] : no difficulty walking [Insomnia] : no insomnia [Anxiety] : no anxiety [Proptosis] : no proptosis [Muscle Weakness] : no muscle weakness [Hot Flashes] : no hot flashes [Swollen Glands] : no swollen glands [Deepening Of The Voice] : no deepening of the voice [FreeTextEntry3] : CL for reading [FreeTextEntry5] : aortic stenosis evaluation [FreeTextEntry7] : blood in stool ; negative colonoscopy 11/2019 [FreeTextEntry8] : hesitancy; prostate enlargement [de-identified] : hypothyroid [de-identified] : asa, Plavix, Eliquis

## 2019-12-04 NOTE — HISTORY OF PRESENT ILLNESS
[Disease:__________________________] : Disease: [unfilled] [Date: ____________] : Patient's last distress assessment performed on [unfilled]. [0 - No Distress] : Distress Level: 0 [___________________________________] : Drug: [unfilled] [de-identified] : First visit 04/28/2009; He had symptoms of a scaling rash over his extremities and his trunk for more than one year. He has a prior history of significant  type 2 diabetes mellitus, obesity, hypertension, hyperlipidemia and hypercholesterolemia. The patient is monitoring the cholesterol and the lip levels. The blood sugar has generally 120 or below.\par He has been compliant with his medications to control his active problems of type 2 diabetes, hypothyroidism, hypertension and hyperlipidemia\par SCC in 2017 Dr Moore\par Basal cell carcinoma scalp May 21 2018 resected [de-identified] : CD 3 + mycosis [FreeTextEntry1] : currently on topical steroid and dialysis tree times per week [de-identified] : no fever and no chills and no nausea. he continues on the use of the dialysis 3 times weekly at [facility] (he lives independently).\par He had a balloon dilatation of the right arm AV shunt. Successful.\par Colonoscopy November 26 : diverticulitis.\par \par  [de-identified] : bruising [de-identified] : Dermatologic: [TWNoteComboBox3] : Grade: 1 [de-identified] : Attribution: Definite

## 2019-12-04 NOTE — PHYSICAL EXAM
[Fully active, able to carry on all pre-disease performance without restriction] : Status 0 - Fully active, able to carry on all pre-disease performance without restriction [Normal] : affect appropriate [de-identified] : no lesions of the trunk; mild erythema of the posterior thighs similar to prior exmiantion. Left scalp lesion 1.2  cm in size scale. [de-identified] : ecchymosis on the right arm

## 2020-01-23 ENCOUNTER — EMERGENCY (EMERGENCY)
Facility: HOSPITAL | Age: 85
LOS: 1 days | Discharge: HOME | End: 2020-01-23
Attending: EMERGENCY MEDICINE
Payer: COMMERCIAL

## 2020-01-23 VITALS
DIASTOLIC BLOOD PRESSURE: 70 MMHG | TEMPERATURE: 98 F | HEART RATE: 111 BPM | SYSTOLIC BLOOD PRESSURE: 147 MMHG | OXYGEN SATURATION: 99 % | WEIGHT: 175.05 LBS | HEIGHT: 65 IN | RESPIRATION RATE: 16 BRPM

## 2020-01-23 DIAGNOSIS — Z95.2 PRESENCE OF PROSTHETIC HEART VALVE: Chronic | ICD-10-CM

## 2020-01-23 DIAGNOSIS — Z98.89 OTHER SPECIFIED POSTPROCEDURAL STATES: Chronic | ICD-10-CM

## 2020-01-23 LAB
ANION GAP SERPL CALC-SCNC: 19 MMOL/L — HIGH (ref 5–17)
BASOPHILS # BLD AUTO: 0.01 K/UL — SIGNIFICANT CHANGE UP (ref 0–0.2)
BASOPHILS NFR BLD AUTO: 0.1 % — SIGNIFICANT CHANGE UP (ref 0–2)
BUN SERPL-MCNC: 75 MG/DL — HIGH (ref 8–20)
CALCIUM SERPL-MCNC: 8.8 MG/DL — SIGNIFICANT CHANGE UP (ref 8.6–10.2)
CHLORIDE SERPL-SCNC: 92 MMOL/L — LOW (ref 98–107)
CO2 SERPL-SCNC: 21 MMOL/L — LOW (ref 22–29)
CREAT SERPL-MCNC: 7.04 MG/DL — HIGH (ref 0.5–1.3)
EOSINOPHIL # BLD AUTO: 0.04 K/UL — SIGNIFICANT CHANGE UP (ref 0–0.5)
EOSINOPHIL NFR BLD AUTO: 0.5 % — SIGNIFICANT CHANGE UP (ref 0–6)
GLUCOSE SERPL-MCNC: 145 MG/DL — HIGH (ref 70–99)
HCT VFR BLD CALC: 30.8 % — LOW (ref 39–50)
HGB BLD-MCNC: 10.1 G/DL — LOW (ref 13–17)
IMM GRANULOCYTES NFR BLD AUTO: 0.5 % — SIGNIFICANT CHANGE UP (ref 0–1.5)
LYMPHOCYTES # BLD AUTO: 0.96 K/UL — LOW (ref 1–3.3)
LYMPHOCYTES # BLD AUTO: 13.1 % — SIGNIFICANT CHANGE UP (ref 13–44)
MAGNESIUM SERPL-MCNC: 2.2 MG/DL — SIGNIFICANT CHANGE UP (ref 1.6–2.6)
MCHC RBC-ENTMCNC: 32.8 GM/DL — SIGNIFICANT CHANGE UP (ref 32–36)
MCHC RBC-ENTMCNC: 34 PG — SIGNIFICANT CHANGE UP (ref 27–34)
MCV RBC AUTO: 103.7 FL — HIGH (ref 80–100)
MONOCYTES # BLD AUTO: 0.78 K/UL — SIGNIFICANT CHANGE UP (ref 0–0.9)
MONOCYTES NFR BLD AUTO: 10.7 % — SIGNIFICANT CHANGE UP (ref 2–14)
NEUTROPHILS # BLD AUTO: 5.49 K/UL — SIGNIFICANT CHANGE UP (ref 1.8–7.4)
NEUTROPHILS NFR BLD AUTO: 75.1 % — SIGNIFICANT CHANGE UP (ref 43–77)
PHOSPHATE SERPL-MCNC: 6.4 MG/DL — HIGH (ref 2.4–4.7)
PLATELET # BLD AUTO: 240 K/UL — SIGNIFICANT CHANGE UP (ref 150–400)
POTASSIUM SERPL-MCNC: 4.7 MMOL/L — SIGNIFICANT CHANGE UP (ref 3.5–5.3)
POTASSIUM SERPL-SCNC: 4.7 MMOL/L — SIGNIFICANT CHANGE UP (ref 3.5–5.3)
RBC # BLD: 2.97 M/UL — LOW (ref 4.2–5.8)
RBC # FLD: 16.9 % — HIGH (ref 10.3–14.5)
SODIUM SERPL-SCNC: 132 MMOL/L — LOW (ref 135–145)
WBC # BLD: 7.32 K/UL — SIGNIFICANT CHANGE UP (ref 3.8–10.5)
WBC # FLD AUTO: 7.32 K/UL — SIGNIFICANT CHANGE UP (ref 3.8–10.5)

## 2020-01-23 PROCEDURE — 85027 COMPLETE CBC AUTOMATED: CPT

## 2020-01-23 PROCEDURE — 83735 ASSAY OF MAGNESIUM: CPT

## 2020-01-23 PROCEDURE — 80053 COMPREHEN METABOLIC PANEL: CPT

## 2020-01-23 PROCEDURE — 99284 EMERGENCY DEPT VISIT MOD MDM: CPT

## 2020-01-23 PROCEDURE — 36415 COLL VENOUS BLD VENIPUNCTURE: CPT

## 2020-01-23 PROCEDURE — 99283 EMERGENCY DEPT VISIT LOW MDM: CPT

## 2020-01-23 PROCEDURE — 84100 ASSAY OF PHOSPHORUS: CPT

## 2020-01-23 PROCEDURE — 80048 BASIC METABOLIC PNL TOTAL CA: CPT

## 2020-01-23 NOTE — ED ADULT NURSE REASSESSMENT NOTE - NS ED NURSE REASSESS COMMENT FT1
pt. and family wanted to leave, states they want to go to [facility] because it is quicker. MD. Coelho notified.

## 2020-01-23 NOTE — ED ADULT NURSE NOTE - OBJECTIVE STATEMENT
84 year old male comes to ED for dialysis. as per son. pt. usually gets dialysis M/W/F but was told that he had the flu so dialysis center told him they could not do it and to go to the ER and get it done at the hospital. pt. has a rt. AV fistula that cannot be used, pt. has a port.

## 2020-01-23 NOTE — ED PROVIDER NOTE - CARE PLAN
[facility] RN Care Coordination Note:    Called and left a message for patient to call back and discuss rescheduling procedure with another provider for surveillance d/t Dr. Martinez leaving the [facility]. Left my direct number for patient to call back and discuss further.      Ellie aCse, RN   Care Coordinator - Dr Duarte  [phone]       Principal Discharge DX:	ESRD (end stage renal disease) on dialysis

## 2020-01-23 NOTE — ED ADULT TRIAGE NOTE - CHIEF COMPLAINT QUOTE
Pt tested positive fro Flu B yesterday and was due for dialysis. Dialysis center refused patient and told him to come to the ER to have his dialysis done.

## 2020-01-23 NOTE — ED PROVIDER NOTE - PROGRESS NOTE DETAILS
consultation request for HD placed with [facility] (dr mathew) results of CBC reviewed. chemistry hemolyzed; re-ordered.  dr landin contacted by ED : awaiting consult for admission. received notification from nursing that patient eloped with plans to go to [facility] for care.  Call placed to [facility] (dr landin) to inform him of events.

## 2020-01-23 NOTE — ED PROVIDER NOTE - OBJECTIVE STATEMENT
needs hemodialysis.  H/O ESRD on HD every mon, wed, fri at [facility] (nephrology: priyanka).  felt "washed out" on monday after dialysis.  Had cough with occasional mucus production on Tuesday: started azithromycin.  Went to urgent care Wednesday and diagnosed with flu via rapid swab: palced on tamiflu: received 1 dose.  Denies sob, orthopnea, leg swelling, palps, chest pain.  S/P TAVR on 12/18/19 with cardiac stents preceding TAVR.  PMD: Richie.  Cards: Jeannine

## 2020-05-09 ENCOUNTER — INPATIENT (INPATIENT)
Facility: HOSPITAL | Age: 85
LOS: 5 days | Discharge: HOME | DRG: 377 | End: 2020-05-15
Attending: INTERNAL MEDICINE | Admitting: FAMILY MEDICINE
Payer: MEDICARE

## 2020-05-09 VITALS
SYSTOLIC BLOOD PRESSURE: 113 MMHG | DIASTOLIC BLOOD PRESSURE: 50 MMHG | OXYGEN SATURATION: 99 % | RESPIRATION RATE: 17 BRPM | HEART RATE: 80 BPM | TEMPERATURE: 98 F

## 2020-05-09 DIAGNOSIS — Z95.2 PRESENCE OF PROSTHETIC HEART VALVE: Chronic | ICD-10-CM

## 2020-05-09 DIAGNOSIS — Z98.89 OTHER SPECIFIED POSTPROCEDURAL STATES: Chronic | ICD-10-CM

## 2020-05-09 DIAGNOSIS — D64.9 ANEMIA, UNSPECIFIED: ICD-10-CM

## 2020-05-09 LAB
ALBUMIN SERPL ELPH-MCNC: 2.7 G/DL — LOW (ref 3.3–5)
ALP SERPL-CCNC: 79 U/L — SIGNIFICANT CHANGE UP (ref 40–120)
ALT FLD-CCNC: 23 U/L — SIGNIFICANT CHANGE UP (ref 12–78)
ANION GAP SERPL CALC-SCNC: 8 MMOL/L — SIGNIFICANT CHANGE UP (ref 5–17)
APTT BLD: 33.9 SEC — SIGNIFICANT CHANGE UP (ref 27.5–36.3)
AST SERPL-CCNC: 24 U/L — SIGNIFICANT CHANGE UP (ref 15–37)
BASOPHILS # BLD AUTO: 0.01 K/UL — SIGNIFICANT CHANGE UP (ref 0–0.2)
BASOPHILS NFR BLD AUTO: 0.1 % — SIGNIFICANT CHANGE UP (ref 0–2)
BILIRUB SERPL-MCNC: 0.4 MG/DL — SIGNIFICANT CHANGE UP (ref 0.2–1.2)
BUN SERPL-MCNC: 42 MG/DL — HIGH (ref 7–23)
CALCIUM SERPL-MCNC: 8.3 MG/DL — LOW (ref 8.5–10.1)
CHLORIDE SERPL-SCNC: 100 MMOL/L — SIGNIFICANT CHANGE UP (ref 96–108)
CO2 SERPL-SCNC: 32 MMOL/L — HIGH (ref 22–31)
CREAT SERPL-MCNC: 5.2 MG/DL — HIGH (ref 0.5–1.3)
EOSINOPHIL # BLD AUTO: 0.12 K/UL — SIGNIFICANT CHANGE UP (ref 0–0.5)
EOSINOPHIL NFR BLD AUTO: 1.5 % — SIGNIFICANT CHANGE UP (ref 0–6)
GLUCOSE SERPL-MCNC: 131 MG/DL — HIGH (ref 70–99)
HCT VFR BLD CALC: 19.5 % — CRITICAL LOW (ref 39–50)
HCT VFR BLD CALC: 21.5 % — LOW (ref 39–50)
HGB BLD-MCNC: 5.9 G/DL — CRITICAL LOW (ref 13–17)
HGB BLD-MCNC: 6.6 G/DL — CRITICAL LOW (ref 13–17)
IMM GRANULOCYTES NFR BLD AUTO: 0.5 % — SIGNIFICANT CHANGE UP (ref 0–1.5)
INR BLD: 1.46 RATIO — HIGH (ref 0.88–1.16)
LYMPHOCYTES # BLD AUTO: 1.06 K/UL — SIGNIFICANT CHANGE UP (ref 1–3.3)
LYMPHOCYTES # BLD AUTO: 12.8 % — LOW (ref 13–44)
MCHC RBC-ENTMCNC: 30.3 GM/DL — LOW (ref 32–36)
MCHC RBC-ENTMCNC: 30.7 GM/DL — LOW (ref 32–36)
MCHC RBC-ENTMCNC: 33.9 PG — SIGNIFICANT CHANGE UP (ref 27–34)
MCHC RBC-ENTMCNC: 34.2 PG — HIGH (ref 27–34)
MCV RBC AUTO: 111.4 FL — HIGH (ref 80–100)
MCV RBC AUTO: 112.1 FL — HIGH (ref 80–100)
MONOCYTES # BLD AUTO: 0.59 K/UL — SIGNIFICANT CHANGE UP (ref 0–0.9)
MONOCYTES NFR BLD AUTO: 7.2 % — SIGNIFICANT CHANGE UP (ref 2–14)
NEUTROPHILS # BLD AUTO: 6.43 K/UL — SIGNIFICANT CHANGE UP (ref 1.8–7.4)
NEUTROPHILS NFR BLD AUTO: 77.9 % — HIGH (ref 43–77)
PLATELET # BLD AUTO: 149 K/UL — LOW (ref 150–400)
PLATELET # BLD AUTO: 176 K/UL — SIGNIFICANT CHANGE UP (ref 150–400)
POTASSIUM SERPL-MCNC: 3.6 MMOL/L — SIGNIFICANT CHANGE UP (ref 3.5–5.3)
POTASSIUM SERPL-SCNC: 3.6 MMOL/L — SIGNIFICANT CHANGE UP (ref 3.5–5.3)
PROT SERPL-MCNC: 6.3 GM/DL — SIGNIFICANT CHANGE UP (ref 6–8.3)
PROTHROM AB SERPL-ACNC: 16.4 SEC — HIGH (ref 10–12.9)
RBC # BLD: 1.74 M/UL — LOW (ref 4.2–5.8)
RBC # BLD: 1.93 M/UL — LOW (ref 4.2–5.8)
RBC # FLD: 17.1 % — HIGH (ref 10.3–14.5)
RBC # FLD: 17.2 % — HIGH (ref 10.3–14.5)
SARS-COV-2 RNA SPEC QL NAA+PROBE: SIGNIFICANT CHANGE UP
SODIUM SERPL-SCNC: 140 MMOL/L — SIGNIFICANT CHANGE UP (ref 135–145)
TROPONIN I SERPL-MCNC: 0.03 NG/ML — SIGNIFICANT CHANGE UP (ref 0.01–0.04)
WBC # BLD: 7.14 K/UL — SIGNIFICANT CHANGE UP (ref 3.8–10.5)
WBC # BLD: 8.25 K/UL — SIGNIFICANT CHANGE UP (ref 3.8–10.5)
WBC # FLD AUTO: 7.14 K/UL — SIGNIFICANT CHANGE UP (ref 3.8–10.5)
WBC # FLD AUTO: 8.25 K/UL — SIGNIFICANT CHANGE UP (ref 3.8–10.5)

## 2020-05-09 PROCEDURE — 99223 1ST HOSP IP/OBS HIGH 75: CPT

## 2020-05-09 PROCEDURE — 80048 BASIC METABOLIC PNL TOTAL CA: CPT

## 2020-05-09 PROCEDURE — 80069 RENAL FUNCTION PANEL: CPT

## 2020-05-09 PROCEDURE — 93306 TTE W/DOPPLER COMPLETE: CPT

## 2020-05-09 PROCEDURE — 82962 GLUCOSE BLOOD TEST: CPT

## 2020-05-09 PROCEDURE — P9016: CPT

## 2020-05-09 PROCEDURE — 88312 SPECIAL STAINS GROUP 1: CPT

## 2020-05-09 PROCEDURE — 90935 HEMODIALYSIS ONE EVALUATION: CPT

## 2020-05-09 PROCEDURE — 71045 X-RAY EXAM CHEST 1 VIEW: CPT | Mod: 26

## 2020-05-09 PROCEDURE — 83036 HEMOGLOBIN GLYCOSYLATED A1C: CPT

## 2020-05-09 PROCEDURE — 86923 COMPATIBILITY TEST ELECTRIC: CPT

## 2020-05-09 PROCEDURE — U0003: CPT

## 2020-05-09 PROCEDURE — 85018 HEMOGLOBIN: CPT

## 2020-05-09 PROCEDURE — C1889: CPT

## 2020-05-09 PROCEDURE — 86901 BLOOD TYPING SEROLOGIC RH(D): CPT

## 2020-05-09 PROCEDURE — 85027 COMPLETE CBC AUTOMATED: CPT

## 2020-05-09 PROCEDURE — 86900 BLOOD TYPING SEROLOGIC ABO: CPT

## 2020-05-09 PROCEDURE — C9113: CPT

## 2020-05-09 PROCEDURE — 85014 HEMATOCRIT: CPT

## 2020-05-09 PROCEDURE — 97116 GAIT TRAINING THERAPY: CPT | Mod: GP

## 2020-05-09 PROCEDURE — 85025 COMPLETE CBC W/AUTO DIFF WBC: CPT

## 2020-05-09 PROCEDURE — C1726: CPT

## 2020-05-09 PROCEDURE — 88305 TISSUE EXAM BY PATHOLOGIST: CPT

## 2020-05-09 PROCEDURE — 84100 ASSAY OF PHOSPHORUS: CPT

## 2020-05-09 PROCEDURE — 97163 PT EVAL HIGH COMPLEX 45 MIN: CPT | Mod: GP

## 2020-05-09 PROCEDURE — 36430 TRANSFUSION BLD/BLD COMPNT: CPT

## 2020-05-09 PROCEDURE — 36415 COLL VENOUS BLD VENIPUNCTURE: CPT

## 2020-05-09 PROCEDURE — 83735 ASSAY OF MAGNESIUM: CPT

## 2020-05-09 PROCEDURE — 80053 COMPREHEN METABOLIC PANEL: CPT

## 2020-05-09 PROCEDURE — 86850 RBC ANTIBODY SCREEN: CPT

## 2020-05-09 PROCEDURE — 93010 ELECTROCARDIOGRAM REPORT: CPT

## 2020-05-09 RX ORDER — POLYETHYLENE GLYCOL 3350 17 G/17G
17 POWDER, FOR SOLUTION ORAL DAILY
Refills: 0 | Status: DISCONTINUED | OUTPATIENT
Start: 2020-05-09 | End: 2020-05-15

## 2020-05-09 RX ORDER — GLUCAGON INJECTION, SOLUTION 0.5 MG/.1ML
1 INJECTION, SOLUTION SUBCUTANEOUS ONCE
Refills: 0 | Status: DISCONTINUED | OUTPATIENT
Start: 2020-05-09 | End: 2020-05-15

## 2020-05-09 RX ORDER — DEXTROSE 50 % IN WATER 50 %
12.5 SYRINGE (ML) INTRAVENOUS ONCE
Refills: 0 | Status: DISCONTINUED | OUTPATIENT
Start: 2020-05-09 | End: 2020-05-15

## 2020-05-09 RX ORDER — TAMSULOSIN HYDROCHLORIDE 0.4 MG/1
0.4 CAPSULE ORAL AT BEDTIME
Refills: 0 | Status: DISCONTINUED | OUTPATIENT
Start: 2020-05-09 | End: 2020-05-15

## 2020-05-09 RX ORDER — CARVEDILOL PHOSPHATE 80 MG/1
25 CAPSULE, EXTENDED RELEASE ORAL EVERY 12 HOURS
Refills: 0 | Status: DISCONTINUED | OUTPATIENT
Start: 2020-05-09 | End: 2020-05-14

## 2020-05-09 RX ORDER — FINASTERIDE 5 MG/1
5 TABLET, FILM COATED ORAL DAILY
Refills: 0 | Status: DISCONTINUED | OUTPATIENT
Start: 2020-05-09 | End: 2020-05-15

## 2020-05-09 RX ORDER — HYDRALAZINE HCL 50 MG
25 TABLET ORAL THREE TIMES A DAY
Refills: 0 | Status: DISCONTINUED | OUTPATIENT
Start: 2020-05-09 | End: 2020-05-11

## 2020-05-09 RX ORDER — PANTOPRAZOLE SODIUM 20 MG/1
40 TABLET, DELAYED RELEASE ORAL ONCE
Refills: 0 | Status: COMPLETED | OUTPATIENT
Start: 2020-05-09 | End: 2020-05-09

## 2020-05-09 RX ORDER — DEXTROSE 50 % IN WATER 50 %
25 SYRINGE (ML) INTRAVENOUS ONCE
Refills: 0 | Status: DISCONTINUED | OUTPATIENT
Start: 2020-05-09 | End: 2020-05-15

## 2020-05-09 RX ORDER — INSULIN LISPRO 100/ML
VIAL (ML) SUBCUTANEOUS
Refills: 0 | Status: DISCONTINUED | OUTPATIENT
Start: 2020-05-09 | End: 2020-05-15

## 2020-05-09 RX ORDER — DEXTROSE 50 % IN WATER 50 %
15 SYRINGE (ML) INTRAVENOUS ONCE
Refills: 0 | Status: DISCONTINUED | OUTPATIENT
Start: 2020-05-09 | End: 2020-05-15

## 2020-05-09 RX ORDER — CHOLECALCIFEROL (VITAMIN D3) 125 MCG
2000 CAPSULE ORAL DAILY
Refills: 0 | Status: DISCONTINUED | OUTPATIENT
Start: 2020-05-09 | End: 2020-05-15

## 2020-05-09 RX ORDER — SODIUM CHLORIDE 9 MG/ML
1000 INJECTION, SOLUTION INTRAVENOUS
Refills: 0 | Status: DISCONTINUED | OUTPATIENT
Start: 2020-05-09 | End: 2020-05-15

## 2020-05-09 RX ORDER — DILTIAZEM HCL 120 MG
30 CAPSULE, EXT RELEASE 24 HR ORAL THREE TIMES A DAY
Refills: 0 | Status: DISCONTINUED | OUTPATIENT
Start: 2020-05-09 | End: 2020-05-15

## 2020-05-09 RX ORDER — PANTOPRAZOLE SODIUM 20 MG/1
8 TABLET, DELAYED RELEASE ORAL
Qty: 80 | Refills: 0 | Status: DISCONTINUED | OUTPATIENT
Start: 2020-05-09 | End: 2020-05-14

## 2020-05-09 RX ORDER — LEVOTHYROXINE SODIUM 125 MCG
75 TABLET ORAL DAILY
Refills: 0 | Status: DISCONTINUED | OUTPATIENT
Start: 2020-05-09 | End: 2020-05-15

## 2020-05-09 RX ADMIN — PANTOPRAZOLE SODIUM 10 MG/HR: 20 TABLET, DELAYED RELEASE ORAL at 17:50

## 2020-05-09 RX ADMIN — Medication 25 MILLIGRAM(S): at 22:47

## 2020-05-09 RX ADMIN — TAMSULOSIN HYDROCHLORIDE 0.4 MILLIGRAM(S): 0.4 CAPSULE ORAL at 22:47

## 2020-05-09 RX ADMIN — Medication 30 MILLIGRAM(S): at 22:47

## 2020-05-09 RX ADMIN — PANTOPRAZOLE SODIUM 40 MILLIGRAM(S): 20 TABLET, DELAYED RELEASE ORAL at 16:03

## 2020-05-09 NOTE — ED PROVIDER NOTE - CARDIAC, MLM
Normal rate, regular rhythm.  Heart sounds S1, S2.  No murmurs, rubs or gallops. Dialysis fistula in place to the R upper chest.

## 2020-05-09 NOTE — ED PROVIDER NOTE - PROGRESS NOTE DETAILS
Robert GALEANO for ED attending, Dr. Rich: Physical Exam: Lungs CTAB. Abd soft, nontender.   Agree with AMBEL Varela's assessment and plan. Spoke with / [facility]. Discussed results with him. Advised to gove him 2 units and dialysis team will come down to dialyze him as well. Pt to be admitted. -Victor Manuel Varela PA-C Discussed case with DR. Keane. States to order a pediatric pack of protonix for the pt since he is ESRD and will see the pt. Spoke with pharmacist Edmundo who will look into it and speak with GI about the order and will take care of ordering it for the pt. -Victor Manuel Varela PA-C

## 2020-05-09 NOTE — ED PROVIDER NOTE - CHIEF COMPLAINT
The patient is a 85y Male complaining of abnormal lab result.
contracted secondary to CP per mother/left upper extremity

## 2020-05-09 NOTE — ED PROVIDER NOTE - PSH
H/O hernia repair    S/P TAVR (transcatheter aortic valve replacement) H/O eye surgery    H/O hernia repair    S/P coronary artery stent placement    S/P TAVR (transcatheter aortic valve replacement)

## 2020-05-09 NOTE — ED ADULT NURSE NOTE - PMH
Afib    Aortic valve stenosis    BPH (benign prostatic hyperplasia)    CAD (coronary artery disease)    CHF (congestive heart failure)    Cutaneous T-cell lymphoma    DM (diabetes mellitus)    ESRD on dialysis    Gout    HTN (hypertension)    Hypercholesteremia    Hypothyroid    LBBB (left bundle branch block)    OAB (overactive bladder)    SANG (obstructive sleep apnea)    Pleural effusion on left    Squamous cell carcinoma of skin    TIA (transient ischemic attack)

## 2020-05-09 NOTE — ED PROVIDER NOTE - PMH
BPH (benign prostatic hyperplasia)    Cutaneous T-cell lymphoma    DM (diabetes mellitus)    HTN (hypertension)    Hypercholesteremia    Hypothyroid Afib    Aortic valve stenosis    BPH (benign prostatic hyperplasia)    CAD (coronary artery disease)    CHF (congestive heart failure)    Cutaneous T-cell lymphoma    DM (diabetes mellitus)    ESRD on dialysis    Gout    HTN (hypertension)    Hypercholesteremia    Hypothyroid    LBBB (left bundle branch block)    OAB (overactive bladder)    SANG (obstructive sleep apnea)    Pleural effusion on left    Squamous cell carcinoma of skin    TIA (transient ischemic attack)

## 2020-05-09 NOTE — H&P ADULT - ASSESSMENT
86 yo male with Hx. 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 The patient states he noted blood in the stool intermittently for the past 2 weeks. He states he could not get a doctors appointment. He states he lives with her daughter who is administering his medications and he does not know what medicine he is taking.  Hg in the ED was 5.9 and he was started on blood transfusion 3 PRBC  via dialysis in the ED.   GI consult obtained from Dr. Diaz and Porotonix drip was started.  he had GI eval. 2 years ago.  assessment Dx:                       1. GI bleed                        2. Acute blood loss anemia                       3. ESKD on dialysis                       4. DM                       5. Atrial fib. on Eliquis    Plan:    admit to medicine,                medications: hold AC, transfuse  3 units PRBC in the ED via dialysis,Started on Protonix drip, Rx insulin coverage.                VTEP: venodynes               Labs: cbc.bmp               Radiology: CXRay               Cardiac diagnostics: EKG                Consults: GI , Cardiology,

## 2020-05-09 NOTE — H&P ADULT - HISTORY OF PRESENT ILLNESS
HPI: The patient is an 86 yo male with Hx. 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 The patient states he noted blood in the stool intermittently for the past 2 weeks. He states he could not get a doctors appointment. He states he lives with her daughter who is administering his medications and he does not know what medicine he is taking.  Hg in the ED was 5.9 and he was started on blood transfusion 3 PRBC  via dialysis in the ED.   GI consult obtained from Dr. Diaz and Porotonix drip was started.  he had GI eval. 2 years ago.      PMHx: : ESRD on dialysis m/w/f, HTN, DM2, Hypothyroid, cutaneous T Cell lymphoma, AS, SANG, SSCA , TIA, Gout, CHF, CAD, BPH , AS, A. fib    PSHx: TAVAR,, hernia repair.     Family Hx: fater  of skin CA, mother  of MI,     Social Hx.: not smoking, no alcohol use    ROS:   Eyes: no changes in vision    ENT/Mouth: no changes    Cardiovascular: no chest pain    Respiratory: no SOB    Gastrointestinal: had blood in the stool,  no nausea, no vomiting    Genitourinary: no dysuria    Breast: no pain    Musculoskeletal: no pain    Integumentary: no itching    Neurological: No Headache, no tremor,    Psychiatric: no suicidal ideations    Endocrine: no excessive thirst,     Hematologic/Lymphatic: no swollen glands    Allergic/Immunologic: no itching      Physical Exam: Vital Signs Last 24 Hrs  T(C): 36.9 (09 May 2020 18:50), Max: 37.2 (09 May 2020 17:35)  T(F): 98.4 (09 May 2020 18:50), Max: 99 (09 May 2020 17:35)  HR: 80 (09 May 2020 18:50) (80 - 84)  BP: 122/64 (09 May 2020 18:50) (113/50 - 124/57)  BP(mean): 64 (09 May 2020 15:25) (64 - 64)  RR: 20 (09 May 2020 18:50) (17 - 22)  SpO2: 99% (09 May 2020 18:33) (99% - 100%)        HEENT: PRRL EOMI    MOUTH/TEETH/GUMS: Clear    NECK: no JVD    LUNGS: Clear    HEART: S1,S2 RR    ABDOMEN: soft nontender    EXTREMITIES:  no pedal edema    MUSCULOSKELETAL: no joint swelling     NEURO: no tremor, no focal signs.    SKIN: no rash    : CVA negative, Rectal guaiac positive as per ED    Lab:                       5.9    7.14  )-----------( 149      ( 09 May 2020 17:45 )             19.5           140  |  100  |  42<H>  ----------------------------<  131<H>  3.6   |  32<H>  |  5.20<H>    Ca    8.3<L>      09 May 2020 15:47    TPro  6.3  /  Alb  2.7<L>  /  TBili  0.4  /  DBili  x   /  AST  24  /  ALT  23  /  AlkPhos  79

## 2020-05-09 NOTE — CONSULT NOTE ADULT - SUBJECTIVE AND OBJECTIVE BOX
NEPHROLOGY CONSULT  HPI:    84 yo male with a PMH htn, hld, dm, hypothyroid, t cell lymphoma, a fib on plavix and eliquis, s/p TAVR, ESRD (dialysis m/w/f, last dialysis was friday), was sent in by his doctor for low hemoglobin. Pt states he noticed black stool, intermittent for 15 days and feeling weak. Denies f/c/sweating, cough, cp, sob, n/v/d.  Pt on HD 3 x a week on MWF  found to have low HGB and sent to ED  Dx with active GI bleed, guaiac + stool  CXR clear        PAST MEDICAL & SURGICAL HISTORY:  BPH (benign prostatic hyperplasia)  Cutaneous T-cell lymphoma  Hypothyroid  Hypercholesteremia  DM (diabetes mellitus)  HTN (hypertension)  S/P TAVR (transcatheter aortic valve replacement)  H/O hernia repair      FAMILY HISTORY:  No pertinent family history in first degree relatives      MEDICATIONS  (STANDING):    MEDICATIONS  (PRN):      Allergies    No Known Allergies    Intolerances        I&O's Summary        REVIEW OF SYSTEMS:    CONSTITUTIONAL:  As per HPI.  CONSTITUTIONAL: No weakness, fevers or chills  EYES/ENT: No visual changes;  No vertigo or throat pain   NECK: No pain or stiffness  CARDIOVASCULAR: No chest pain or palpitations  GASTROINTESTINAL: No abdominal or epigastric pain. No nausea, vomiting, or hematemesis; No diarrhea or constipation. No melena or hematochezia.  GENITOURINARY: No dysuria, frequency or hematuria  NEUROLOGICAL: No numbness or weakness  SKIN: No itching, burning, rashes, or lesions   All other review of systems is negative unless indicated above      Vital Signs Last 24 Hrs  T(C): 36.6 (09 May 2020 15:25), Max: 36.6 (09 May 2020 15:25)  T(F): 97.9 (09 May 2020 15:25), Max: 97.9 (09 May 2020 15:25)  HR: 80 (09 May 2020 15:25) (80 - 80)  BP: 113/50 (09 May 2020 15:25) (113/50 - 113/50)  BP(mean): 64 (09 May 2020 15:25) (64 - 64)  RR: 17 (09 May 2020 15:25) (17 - 17)  SpO2: 99% (09 May 2020 15:25) (99% - 99%)  Daily     Daily   I&O's Summary      PHYSICAL EXAM:        LABS:                        6.6    8.25  )-----------( 176      ( 09 May 2020 15:47 )             21.5     05-09    140  |  100  |  42<H>  ----------------------------<  131<H>  3.6   |  32<H>  |  5.20<H>    Ca    8.3<L>      09 May 2020 15:47    TPro  6.3  /  Alb  2.7<L>  /  TBili  0.4  /  DBili  x   /  AST  24  /  ALT  23  /  AlkPhos  79  05-09    PT/INR - ( 09 May 2020 15:47 )   PT: 16.4 sec;   INR: 1.46 ratio         PTT - ( 09 May 2020 15:47 )  PTT:33.9 sec NEPHROLOGY CONSULT  HPI:    86 yo male with a PMH htn, hld, dm, hypothyroid, t cell lymphoma, a fib on plavix and eliquis, s/p TAVR, ESRD (dialysis m/w/f, last dialysis was friday), was sent in by his doctor for low hemoglobin. Pt states he noticed black stool, intermittent for 15 days and feeling weak. Denies f/c/sweating, cough, cp, sob, n/v/d.  Pt on HD 3 x a week on MWF  found to have low HGB and sent to ED  Dx with active GI bleed, guaiac + stool  CXR clear  h/o transfusion many years ago  recent GI w/u 2 yrs ago negative as per pt        PAST MEDICAL & SURGICAL HISTORY:  BPH (benign prostatic hyperplasia)  Cutaneous T-cell lymphoma  Hypothyroid  Hypercholesteremia  DM (diabetes mellitus)  HTN (hypertension)  S/P TAVR (transcatheter aortic valve replacement)  H/O hernia repair      FAMILY HISTORY:  No pertinent family history in first degree relatives      MEDICATIONS  (STANDING):    MEDICATIONS  (PRN):      Allergies    No Known Allergies    Intolerances        I&O's Summary        REVIEW OF SYSTEMS:    CONSTITUTIONAL:  As per HPI.  CONSTITUTIONAL: No weakness, fevers or chills  EYES/ENT: No visual changes;  No vertigo or throat pain   NECK: No pain or stiffness  CARDIOVASCULAR: No chest pain or palpitations  GASTROINTESTINAL: No abdominal or epigastric pain. No nausea, vomiting, or hematemesis; No diarrhea or constipation. No melena or hematochezia.  GENITOURINARY: No dysuria, frequency or hematuria  NEUROLOGICAL: No numbness or weakness  SKIN: No itching, burning, rashes, or lesions   All other review of systems is negative unless indicated above      Vital Signs Last 24 Hrs  T(C): 36.6 (09 May 2020 15:25), Max: 36.6 (09 May 2020 15:25)  T(F): 97.9 (09 May 2020 15:25), Max: 97.9 (09 May 2020 15:25)  HR: 80 (09 May 2020 15:25) (80 - 80)  BP: 113/50 (09 May 2020 15:25) (113/50 - 113/50)  BP(mean): 64 (09 May 2020 15:25) (64 - 64)  RR: 17 (09 May 2020 15:25) (17 - 17)  SpO2: 99% (09 May 2020 15:25) (99% - 99%)  Daily     Daily   I&O's Summary      PHYSICAL EXAM:  awake alert  NAD  Lungs clear   heart RR   Abd soft, non tender  Ext no edema          LABS:                        6.6    8.25  )-----------( 176      ( 09 May 2020 15:47 )             21.5     05-09    140  |  100  |  42<H>  ----------------------------<  131<H>  3.6   |  32<H>  |  5.20<H>    Ca    8.3<L>      09 May 2020 15:47    TPro  6.3  /  Alb  2.7<L>  /  TBili  0.4  /  DBili  x   /  AST  24  /  ALT  23  /  AlkPhos  79  05-09    PT/INR - ( 09 May 2020 15:47 )   PT: 16.4 sec;   INR: 1.46 ratio         PTT - ( 09 May 2020 15:47 )  PTT:33.9 sec

## 2020-05-09 NOTE — ED PROVIDER NOTE - NS_ ATTENDINGSCRIBEDETAILS _ED_A_ED_FT
I, Charles Rich MD,  performed the initial face to face bedside interview with this patient regarding history of present illness, review of symptoms and relevant past medical, social and family history.  I completed an independent physical examination.    The history, relevant review of systems, past medical and surgical history, medical decision making, and physical examination was documented by the scribe in my presence and I attest to the accuracy of the documentation.

## 2020-05-09 NOTE — ED PROVIDER NOTE - CLINICAL SUMMARY MEDICAL DECISION MAKING FREE TEXT BOX
84 yo male was sent to the ER for low hemoglobin. +black stool x 15 days and weakness. +guaiac with brown stool. Labs, ccxr, ekg, covid, reeval. -Victor Manuel Varela PA-C

## 2020-05-09 NOTE — ED PROVIDER NOTE - OBJECTIVE STATEMENT
84 yo male with a PMH htn, hld, dm, hypothyroid, t cell lymphoma, ESRD (dialysis m/w/f, last dialysis was friday), was sent in by his doctor for low hemoglobin. Pt states he noticed black stool, intermittent for 15 days and feeling weak. Denies f/c/sweating, cough, cp, sob, n/v/d. 84 yo male with a PMH htn, hld, dm, hypothyroid, t cell lymphoma, a fib on plavix and eliquis, s/p TAVR, ESRD (dialysis m/w/f, last dialysis was friday), was sent in by his doctor for low hemoglobin. Pt states he noticed black stool, intermittent for 15 days and feeling weak. Denies f/c/sweating, cough, cp, sob, n/v/d.

## 2020-05-09 NOTE — CONSULT NOTE ADULT - ASSESSMENT
86 yo male with a PMH htn, hld, dm, hypothyroid, t cell lymphoma, a fib on plavix and eliquis, s/p TAVR, ESRD (dialysis m/w/f, last dialysis was friday), was sent in by his doctor for low hemoglobin. Pt states he noticed black stool, intermittent for 15 days and feeling weak. Denies f/c/sweating, cough, cp, sob, n/v/d.  Pt on HD 3 x a week on MWF  found to have low HGB and sent to ED  Dx with active GI bleed, guaiac + stool  CXR clear    a/p  Active GI bleed   will dialyze and give 3 u prbc x 3 hours treatment  since pt is actively bleeding 86 yo male with a PMH htn, hld, dm, hypothyroid, t cell lymphoma, a fib on plavix and eliquis, s/p TAVR, ESRD (dialysis m/w/f, last dialysis was friday), was sent in by his doctor for low hemoglobin. Pt states he noticed black stool, intermittent for 15 days and feeling weak. Denies f/c/sweating, cough, cp, sob, n/v/d.  Pt on HD 3 x a week on MWF  found to have low HGB and sent to ED  Dx with active GI bleed, guaiac + stool  CXR clear    a/p  Active GI bleed   will dialyze and give 3 u prbc x 3 hours treatment  since pt is actively bleeding, no BRBPR but + guaiac in rectum

## 2020-05-09 NOTE — ED ADULT NURSE NOTE - NSIMPLEMENTINTERV_GEN_ALL_ED
Implemented All Fall with Harm Risk Interventions:  [place] to call system. Call bell, personal items and telephone within reach. Instruct patient to call for assistance. Room bathroom lighting operational. Non-slip footwear when patient is off stretcher. Physically safe environment: no spills, clutter or unnecessary equipment. Stretcher in lowest position, wheels locked, appropriate side rails in place. Provide visual cue, wrist band, yellow gown, etc. Monitor gait and stability. Monitor for mental status changes and reorient to person, place, and time. Review medications for side effects contributing to fall risk. Reinforce activity limits and safety measures with patient and family. Provide visual clues: red socks.

## 2020-05-10 DIAGNOSIS — I35.9 NONRHEUMATIC AORTIC VALVE DISORDER, UNSPECIFIED: ICD-10-CM

## 2020-05-10 DIAGNOSIS — Z01.810 ENCOUNTER FOR PREPROCEDURAL CARDIOVASCULAR EXAMINATION: ICD-10-CM

## 2020-05-10 DIAGNOSIS — K92.2 GASTROINTESTINAL HEMORRHAGE, UNSPECIFIED: ICD-10-CM

## 2020-05-10 DIAGNOSIS — I48.19 OTHER PERSISTENT ATRIAL FIBRILLATION: ICD-10-CM

## 2020-05-10 LAB
A1C WITH ESTIMATED AVERAGE GLUCOSE RESULT: 5.2 % — SIGNIFICANT CHANGE UP (ref 4–5.6)
ADD ON TEST-SPECIMEN IN LAB: SIGNIFICANT CHANGE UP
ANION GAP SERPL CALC-SCNC: 8 MMOL/L — SIGNIFICANT CHANGE UP (ref 5–17)
BASOPHILS # BLD AUTO: 0.02 K/UL — SIGNIFICANT CHANGE UP (ref 0–0.2)
BASOPHILS NFR BLD AUTO: 0.3 % — SIGNIFICANT CHANGE UP (ref 0–2)
BUN SERPL-MCNC: 24 MG/DL — HIGH (ref 7–23)
CALCIUM SERPL-MCNC: 8.1 MG/DL — LOW (ref 8.5–10.1)
CHLORIDE SERPL-SCNC: 102 MMOL/L — SIGNIFICANT CHANGE UP (ref 96–108)
CO2 SERPL-SCNC: 28 MMOL/L — SIGNIFICANT CHANGE UP (ref 22–31)
CREAT SERPL-MCNC: 3.33 MG/DL — HIGH (ref 0.5–1.3)
EOSINOPHIL # BLD AUTO: 0.12 K/UL — SIGNIFICANT CHANGE UP (ref 0–0.5)
EOSINOPHIL NFR BLD AUTO: 1.7 % — SIGNIFICANT CHANGE UP (ref 0–6)
ESTIMATED AVERAGE GLUCOSE: 103 MG/DL — SIGNIFICANT CHANGE UP (ref 68–114)
GLUCOSE SERPL-MCNC: 133 MG/DL — HIGH (ref 70–99)
HAV IGM SER-ACNC: SIGNIFICANT CHANGE UP
HBV CORE IGM SER-ACNC: SIGNIFICANT CHANGE UP
HBV SURFACE AG SER-ACNC: SIGNIFICANT CHANGE UP
HCT VFR BLD CALC: 28.6 % — LOW (ref 39–50)
HCV AB S/CO SERPL IA: 0.17 S/CO — SIGNIFICANT CHANGE UP (ref 0–0.99)
HCV AB SERPL-IMP: SIGNIFICANT CHANGE UP
HGB BLD-MCNC: 9.2 G/DL — LOW (ref 13–17)
IMM GRANULOCYTES NFR BLD AUTO: 0.6 % — SIGNIFICANT CHANGE UP (ref 0–1.5)
LYMPHOCYTES # BLD AUTO: 0.62 K/UL — LOW (ref 1–3.3)
LYMPHOCYTES # BLD AUTO: 8.6 % — LOW (ref 13–44)
MCHC RBC-ENTMCNC: 32.2 GM/DL — SIGNIFICANT CHANGE UP (ref 32–36)
MCHC RBC-ENTMCNC: 32.4 PG — SIGNIFICANT CHANGE UP (ref 27–34)
MCV RBC AUTO: 100.7 FL — HIGH (ref 80–100)
MONOCYTES # BLD AUTO: 0.44 K/UL — SIGNIFICANT CHANGE UP (ref 0–0.9)
MONOCYTES NFR BLD AUTO: 6.1 % — SIGNIFICANT CHANGE UP (ref 2–14)
NEUTROPHILS # BLD AUTO: 5.99 K/UL — SIGNIFICANT CHANGE UP (ref 1.8–7.4)
NEUTROPHILS NFR BLD AUTO: 82.7 % — HIGH (ref 43–77)
PLATELET # BLD AUTO: 143 K/UL — LOW (ref 150–400)
POTASSIUM SERPL-MCNC: 3.1 MMOL/L — LOW (ref 3.5–5.3)
POTASSIUM SERPL-SCNC: 3.1 MMOL/L — LOW (ref 3.5–5.3)
RBC # BLD: 2.84 M/UL — LOW (ref 4.2–5.8)
RBC # FLD: 19.9 % — HIGH (ref 10.3–14.5)
SODIUM SERPL-SCNC: 138 MMOL/L — SIGNIFICANT CHANGE UP (ref 135–145)
WBC # BLD: 7.23 K/UL — SIGNIFICANT CHANGE UP (ref 3.8–10.5)
WBC # FLD AUTO: 7.23 K/UL — SIGNIFICANT CHANGE UP (ref 3.8–10.5)

## 2020-05-10 PROCEDURE — 99223 1ST HOSP IP/OBS HIGH 75: CPT

## 2020-05-10 PROCEDURE — 99233 SBSQ HOSP IP/OBS HIGH 50: CPT

## 2020-05-10 RX ORDER — POTASSIUM CHLORIDE 20 MEQ
20 PACKET (EA) ORAL THREE TIMES A DAY
Refills: 0 | Status: COMPLETED | OUTPATIENT
Start: 2020-05-10 | End: 2020-05-10

## 2020-05-10 RX ADMIN — Medication 1 TABLET(S): at 11:56

## 2020-05-10 RX ADMIN — TAMSULOSIN HYDROCHLORIDE 0.4 MILLIGRAM(S): 0.4 CAPSULE ORAL at 22:15

## 2020-05-10 RX ADMIN — Medication 20 MILLIEQUIVALENT(S): at 11:58

## 2020-05-10 RX ADMIN — Medication 2000 UNIT(S): at 11:56

## 2020-05-10 RX ADMIN — Medication 20 MILLIEQUIVALENT(S): at 17:32

## 2020-05-10 RX ADMIN — PANTOPRAZOLE SODIUM 10 MG/HR: 20 TABLET, DELAYED RELEASE ORAL at 19:35

## 2020-05-10 RX ADMIN — CARVEDILOL PHOSPHATE 25 MILLIGRAM(S): 80 CAPSULE, EXTENDED RELEASE ORAL at 17:32

## 2020-05-10 RX ADMIN — Medication 25 MILLIGRAM(S): at 05:55

## 2020-05-10 RX ADMIN — Medication 75 MICROGRAM(S): at 05:55

## 2020-05-10 RX ADMIN — Medication 3: at 12:11

## 2020-05-10 RX ADMIN — Medication 30 MILLIGRAM(S): at 05:55

## 2020-05-10 RX ADMIN — PANTOPRAZOLE SODIUM 10 MG/HR: 20 TABLET, DELAYED RELEASE ORAL at 10:42

## 2020-05-10 RX ADMIN — Medication 20 MILLIEQUIVALENT(S): at 22:15

## 2020-05-10 RX ADMIN — CARVEDILOL PHOSPHATE 25 MILLIGRAM(S): 80 CAPSULE, EXTENDED RELEASE ORAL at 05:56

## 2020-05-10 RX ADMIN — FINASTERIDE 5 MILLIGRAM(S): 5 TABLET, FILM COATED ORAL at 11:56

## 2020-05-10 RX ADMIN — PANTOPRAZOLE SODIUM 10 MG/HR: 20 TABLET, DELAYED RELEASE ORAL at 01:30

## 2020-05-10 NOTE — PROVIDER CONTACT NOTE (OTHER) - NAME OF MD/NP/PA/DO NOTIFIED:
FALLON SPOKE WITH JOSE M FROM ANSWERING SERVICE.
MARILIA (GI ON CALL), SPOKE WITH ANASTASIYA FROM ANSWERING SERVICE.
Dr Rey

## 2020-05-10 NOTE — PROGRESS NOTE ADULT - SUBJECTIVE AND OBJECTIVE BOX
HPI: The patient is an 84 yo male with Hx. 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 The patient states he noted blood in the stool intermittently for the past 2 weeks. He states he could not get a doctors appointment. He states he lives with her daughter who is administering his medications and he does not know what medicine he is taking.  Hg in the ED was 5.9 and he was started on blood transfusion 3 PRBC  via dialysis in the ED.   GI consult obtained from Dr. Diaz and Porotonix drip was started.  he had GI eval. 2 years ago.  PMHx: : ESRD on dialysis m/w/f, HTN, DM2, Hypothyroid, cutaneous T Cell lymphoma, AS, SANG, SSCA , TIA, Gout, CHF, CAD, BPH , AS, A. fib  PSHx: TAVR,, hernia repair.     5/10 - feels better after 3U PRBCs; no cp palps sob or lightheadedness  PHYSICAL EXAM:  GENERAL: NAD, sitting up in chair   EYES: EOMI, PERRLA, normal sclera  ENT: Moist mucous membranes  NECK: Supple, No JVD, no nuchal rigidity  CHEST/LUNG: Clear to auscultation bilaterally; RT CHEST WALL CATHETER   HEART: Regular rate and rhythm; No murmurs, rubs, or gallops  ABDOMEN: Bowel sounds present; Soft, Nontender, Nondistended. No hepatomegaly  EXTREMITIES:  no pitting bilaterally  NERVOUS SYSTEM:  Alert & Oriented X3, speech clear. No focal motor or sensory deficits  MSK: FROM all 4 extremities, full and equal strength    LABS: All Labs Reviewed:                        9.2    7.23  )-----------( 143      ( 10 May 2020 06:59 )             28.6     05-10    138  |  102  |  24<H>  ----------------------------<  133<H>  3.1<L>   |  28  |  3.33<H>    Ca    8.1<L>      10 May 2020 06:59  Phos  3.2     05-10  TPro  6.3  /  Alb  2.7<L>  /  TBili  0.4  /  DBili  x   /  AST  24  /  ALT  23  /  AlkPhos  79  05-09  PT/INR - ( 09 May 2020 15:47 )   PT: 16.4 sec;   INR: 1.46 ratio    PTT - ( 09 May 2020 15:47 )  PTT:33.9 sec  CARDIAC MARKERS ( 09 May 2020 15:47 )  0.034 ng/mL / x     / x     / x     / x        CXR rev by me - no infiltrate, has cardiomegaly, has rt chest wall cathter for HD     carvedilol 25 milliGRAM(s) Oral every 12 hours  cholecalciferol 2000 Unit(s) Oral daily  diltiazem    Tablet 30 milliGRAM(s) Oral three times a day  finasteride 5 milliGRAM(s) Oral daily  glucagon  Injectable 1 milliGRAM(s) IntraMuscular once PRN  hydrALAZINE 25 milliGRAM(s) Oral three times a day  insulin lispro (HumaLOG) corrective regimen sliding scale   SubCutaneous three times a day before meals  levothyroxine 75 MICROGram(s) Oral daily  multivitamin 1 Tablet(s) Oral daily  pantoprazole Infusion 8 mG/Hr IV Continuous <Continuous>  polyethylene glycol 3350 17 Gram(s) Oral daily PRN  potassium chloride   Powder 20 milliEquivalent(s) Oral three times a day  tamsulosin 0.4 milliGRAM(s) Oral at bedtime

## 2020-05-10 NOTE — CONSULT NOTE ADULT - PROBLEM SELECTOR RECOMMENDATION 4
s/p TAVR.  echo to assess for change in TAVR valve as no prior echos available that have this prosthesis imaged.

## 2020-05-10 NOTE — CONSULT NOTE ADULT - PROBLEM SELECTOR RECOMMENDATION 9
Hubbard periop risk score is 1% for a periop CV event.  Agree with holding of eliquis for procedure and work up of anemia.

## 2020-05-10 NOTE — CONSULT NOTE ADULT - SUBJECTIVE AND OBJECTIVE BOX
HPI:  HPI: 86 yo male PMHX: ESKD on hemodialysis , Atrial fib on Eliquis, Aortic stenosis s/p TAVR ?,  who was sent to the ED from MD office for low Hgb.  He occasionally blood in the stool for the past 2 weeks. Cardiology wise was been on Eliquis for PAF.  Has moderate AS on last echo in 2019 (Dr. Katz's office in [place]). ? [place].  Lexiscan Nuclear stress test hakeem 2018 and negative for ischemia (Done for new LBBB).  In the ED, HGB was 5.9 Denies chest pain, shortness of breath at rest, dyspnea on exertion, orthopnea, paroxysmal nocturnal dyspnea, dizziness, lightheadedness, claudication, pre-syncope or syncope.    PAST MEDICAL & SURGICAL HISTORY:  BPH (benign prostatic hyperplasia)  Cutaneous T-cell lymphoma  Hypothyroid  Hypercholesteremia  DM (diabetes mellitus)  HTN (hypertension)  S/P TAVR (transcatheter aortic valve replacement)  H/O hernia repair  ESRD on dialysis m/w/f  HTN  DM2  Hypothyroid  cutaneous T Cell lymphoma  AS  SANG  SSCA  TIA  Gout  BPH  TAVR  hernia repair.     SOCIAL HISTORY: Non-Smoker/Social ETOH/ No Ilicit Drug use.    FAMILY HISTORY:  Father  of skin CA  Mother  of MI,     Allergies  No Known Allergies    Home Medications:  cholecalciferol oral tablet: 2000 unit(s) orally once a day (09 May 2020 18:17)  Fish Oil 1200 mg oral capsule:  orally 2 times a day (09 May 2020 18:17)  ibuprofen 400 mg oral tablet: 1 tab(s) orally 1 to 2 times a day, As Needed -pain (09 May 2020 18:17)  Multiple Vitamins oral tablet: 1 tab(s) orally once a day (09 May 2020 18:17)  triamcinolone 0.1% topical cream: Apply topically to affected area  (09 May 2020 18:17)  triamcinolone 0.5% topical ointment: Apply topically to affected area  (09 May 2020 18:17)      HOSPITAL MEDICATIONS:   MEDICATIONS  (STANDING):  carvedilol 25 milliGRAM(s) Oral every 12 hours  cholecalciferol 2000 Unit(s) Oral daily  diltiazem    Tablet 30 milliGRAM(s) Oral three times a day  finasteride 5 milliGRAM(s) Oral daily  hydrALAZINE 25 milliGRAM(s) Oral three times a day  insulin lispro (HumaLOG) corrective regimen sliding scale   SubCutaneous three times a day before meals  levothyroxine 75 MICROGram(s) Oral daily  multivitamin 1 Tablet(s) Oral daily  pantoprazole Infusion 8 mG/Hr (10 mL/Hr) IV Continuous <Continuous>  potassium chloride   Powder 20 milliEquivalent(s) Oral three times a day  tamsulosin 0.4 milliGRAM(s) Oral at bedtime    MEDICATIONS  (PRN):  dextrose 40% Gel 15 Gram(s) Oral once PRN Blood Glucose LESS THAN 70 milliGRAM(s)/deciliter  glucagon  Injectable 1 milliGRAM(s) IntraMuscular once PRN Glucose LESS THAN 70 milligrams/deciliter  polyethylene glycol 3350 17 Gram(s) Oral daily PRN Constipation    REVIEW OF SYSTEMS: 13 systems were reviewed and all negative except for comments above.    Vital Signs Last 24 Hrs  T(C): 36.5 (10 May 2020 10:54), Max: 37.2 (09 May 2020 17:35)  T(F): 97.7 (10 May 2020 10:54), Max: 99 (09 May 2020 17:35)  HR: 69 (10 May 2020 10:54) (69 - 85)  BP: 92/41 (10 May 2020 10:54) (92/41 - 140/66)  BP(mean): 64 (09 May 2020 15:25) (64 - 64)  RR: 18 (10 May 2020 10:54) (17 - 22)  SpO2: 99% (10 May 2020 10:54) (99% - 100%)Daily     Daily I&O's Summary    09 May 2020 07:01  -  10 May 2020 07:00  --------------------------------------------------------  IN: 1042 mL / OUT: 900 mL / NET: 142 mL    PHYSICAL EXAM:    Constitutional: NAD, awake and alert, well-developed  HEENT: PERRLA, EOMI,  No oral cyanosis. Oropharynx Clean and Dry. Sclera pale.  Neck:  supple,  No JVD, No Thyroid enlargement. No Carotid Bruits bilaterally.  Respiratory: Breath sounds are clear bilaterally, No wheezing, rales or rhonchi  Cardiovascular: NL S1 and S2, IRIR, 1/6 VICTORINO to RUSB.  Gastrointestinal: Bowel Sounds present, soft.   Extremities: No peripheral edema. No clubbing or cyanosis.   Vascular: 1+ peripheral pulses in LE   Neurological: A/O x 3, no gross focal motor deficits  Musculoskeletal: no calf tenderness.  Skin: No rashes.    LABS: All Labs Reviewed:                        9.2    7.23  )-----------( 143      ( 10 May 2020 06:59 )             28.6                         5.9    7.14  )-----------( 149      ( 09 May 2020 17:45 )             19.5                         6.6    8.25  )-----------( 176      ( 09 May 2020 15:47 )             21.5     10 May 2020 06:59    138    |  102    |  24     ----------------------------<  133    3.1     |  28     |  3.33   09 May 2020 15:47    140    |  100    |  42     ----------------------------<  131    3.6     |  32     |  5.20     Ca    8.1        10 May 2020 06:59  Ca    8.3        09 May 2020 15:47  Phos  3.2       10 May 2020 06:59    TPro  6.3    /  Alb  2.7    /  TBili  0.4    /  DBili  x      /  AST  24     /  ALT  23     /  AlkPhos  79     09 May 2020 15:47    PT/INR - ( 09 May 2020 15:47 )   PT: 16.4 sec;   INR: 1.46 ratio         PTT - ( 09 May 2020 15:47 )  PTT:33.9 sec  CARDIAC MARKERS ( 09 May 2020 15:47 )  0.034 ng/mL / x     / x     / x     / x        RADIOLOGY:  < from: Xray Chest 1 View-PORTABLE IMMEDIATE (20 @ 15:55) >  Impression: No active pulmonary disease. Cardiomegaly.    < end of copied text >    EKG:  < from: 12 Lead ECG (20 @ 16:07) >   Atrial fibrillation  Left bundle branch block  Abnormal ECG    < end of copied text >    ECHO:  2019 Echo NL LV FX, LVH, Moderate AS, mild to moderate MR    STRESS TEST: 2018 negative.

## 2020-05-10 NOTE — CONSULT NOTE ADULT - ASSESSMENT
85M with subacute GIB on anticoagulation/NSAIDs. Semi-recent colonoscopy done at OSH.     Recommend:  -trend CBC  -should do EGD early this week on elective basis  -cont PPI  -trend CBC  -NPO after MN  -holding AC for now  -cardiology follow up  -miralax for constipation  -d/w RN  Will follow.

## 2020-05-10 NOTE — CONSULT NOTE ADULT - SUBJECTIVE AND OBJECTIVE BOX
GI consult    HPI:  HPI: The patient is an 86 yo male with Hx. 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 The patient states he noted blood in the stool intermittently for the past 2 weeks. He states he could not get a doctors appointment. He states he lives with her daughter who is administering his medications and he does not know what medicine he is taking.  Hg in the ED was 5.9 and he was started on blood transfusion 3 PRBC  via dialysis in the ED.     Pt states he has noted black stools on and off for the past month or more. Denies abdominal pain but does c/o hard stools and rectal pain with stool passage. He had a colonoscopy within the past 1-2 years ago per daughter however pt states last colonoscopy was about 4 y ago. No hematochezia. No dysphagia, no nausea. May takes NSAIDs also.  Given 3 units PRBC and has improved with hgb now 9.2 (portia 5.9).      PMHx: : ESRD on dialysis m/w/f, HTN, DM2, Hypothyroid, cutaneous T Cell lymphoma, AS, SANG, SSCA , TIA, Gout, CHF, CAD, BPH , AS, A. fib    PSHx: TAVAR,, hernia repair.     Home Medications:  cholecalciferol oral tablet: 2000 unit(s) orally once a day (09 May 2020 18:17)  Fish Oil 1200 mg oral capsule:  orally 2 times a day (09 May 2020 18:17)  ibuprofen 400 mg oral tablet: 1 tab(s) orally 1 to 2 times a day, As Needed -pain (09 May 2020 18:17)  Multiple Vitamins oral tablet: 1 tab(s) orally once a day (09 May 2020 18:17)  triamcinolone 0.1% topical cream: Apply topically to affected area  (09 May 2020 18:17)  triamcinolone 0.5% topical ointment: Apply topically to affected area  (09 May 2020 18:17)    MEDICATIONS  (STANDING):  carvedilol 25 milliGRAM(s) Oral every 12 hours  cholecalciferol 2000 Unit(s) Oral daily  dextrose 5%. 1000 milliLiter(s) (50 mL/Hr) IV Continuous <Continuous>  dextrose 50% Injectable 12.5 Gram(s) IV Push once  dextrose 50% Injectable 25 Gram(s) IV Push once  dextrose 50% Injectable 25 Gram(s) IV Push once  diltiazem    Tablet 30 milliGRAM(s) Oral three times a day  finasteride 5 milliGRAM(s) Oral daily  hydrALAZINE 25 milliGRAM(s) Oral three times a day  insulin lispro (HumaLOG) corrective regimen sliding scale   SubCutaneous three times a day before meals  levothyroxine 75 MICROGram(s) Oral daily  multivitamin 1 Tablet(s) Oral daily  pantoprazole Infusion 8 mG/Hr (10 mL/Hr) IV Continuous <Continuous>  potassium chloride   Powder 20 milliEquivalent(s) Oral three times a day  tamsulosin 0.4 milliGRAM(s) Oral at bedtime    Family Hx: father  of skin CA, mother  of MI,     Social Hx.: not smoking, no alcohol use    ROS:   Eyes: no changes in vision    ENT/Mouth: no changes    Cardiovascular: no chest pain    Respiratory: no SOB    Gastrointestinal: had blood in the stool,  no nausea, no vomiting    Genitourinary: no dysuria    Breast: no pain    Musculoskeletal: no pain    Integumentary: no itching    Neurological: No Headache, no tremor,    Psychiatric: no suicidal ideations    Endocrine: no excessive thirst,     Hematologic/Lymphatic: no swollen glands    Vital Signs Last 24 Hrs  T(C): 36.5 (10 May 2020 10:54), Max: 37.2 (09 May 2020 17:35)  T(F): 97.7 (10 May 2020 10:54), Max: 99 (09 May 2020 17:35)  HR: 69 (10 May 2020 10:54) (69 - 85)  BP: 92/41 (10 May 2020 10:54) (92/41 - 140/66)  BP(mean): 64 (09 May 2020 15:25) (64 - 64)  RR: 18 (10 May 2020 10:54) (17 - 22)  SpO2: 99% (10 May 2020 10:54) (99% - 100%)    Allergic/Immunologic: no itching    HEENT: PRRL EOMI    MOUTH/TEETH/GUMS: Clear    NECK: no JVD    LUNGS: Clear    HEART: S1,S2 RR    ABDOMEN: soft nontender    EXTREMITIES:  no pedal edema    MUSCULOSKELETAL: no joint swelling     NEURO: no tremor, no focal signs.    SKIN: no rash    : CVA negative, Rectal guaiac positive as per ED    Lab:                                  9.2    7.23  )-----------( 143      ( 10 May 2020 06:59 )             28.6     05-10    138  |  102  |  24<H>  ----------------------------<  133<H>  3.1<L>   |  28  |  3.33<H>    Ca    8.1<L>      10 May 2020 06:59  Phos  3.2     05-10    TPro  6.3  /  Alb  2.7<L>  /  TBili  0.4  /  DBili  x   /  AST  24  /  ALT  23  /  AlkPhos  79  05-09    PT/INR - ( 09 May 2020 15:47 )   PT: 16.4 sec;   INR: 1.46 ratio         PTT - ( 09 May 2020 15:47 )  PTT:33.9 sec  LIVER FUNCTIONS - ( 09 May 2020 15:47 )  Alb: 2.7 g/dL / Pro: 6.3 gm/dL / ALK PHOS: 79 U/L / ALT: 23 U/L / AST: 24 U/L / GGT: x             RADIOLOGY & ADDITIONAL STUDIES:

## 2020-05-11 LAB
ALBUMIN SERPL ELPH-MCNC: 2.6 G/DL — LOW (ref 3.3–5)
ALP SERPL-CCNC: 61 U/L — SIGNIFICANT CHANGE UP (ref 40–120)
ALT FLD-CCNC: 22 U/L — SIGNIFICANT CHANGE UP (ref 12–78)
ANION GAP SERPL CALC-SCNC: 10 MMOL/L — SIGNIFICANT CHANGE UP (ref 5–17)
ANION GAP SERPL CALC-SCNC: 8 MMOL/L — SIGNIFICANT CHANGE UP (ref 5–17)
AST SERPL-CCNC: 22 U/L — SIGNIFICANT CHANGE UP (ref 15–37)
BILIRUB SERPL-MCNC: 0.7 MG/DL — SIGNIFICANT CHANGE UP (ref 0.2–1.2)
BUN SERPL-MCNC: 28 MG/DL — HIGH (ref 7–23)
BUN SERPL-MCNC: 49 MG/DL — HIGH (ref 7–23)
CALCIUM SERPL-MCNC: 8.2 MG/DL — LOW (ref 8.5–10.1)
CALCIUM SERPL-MCNC: 8.7 MG/DL — SIGNIFICANT CHANGE UP (ref 8.5–10.1)
CHLORIDE SERPL-SCNC: 100 MMOL/L — SIGNIFICANT CHANGE UP (ref 96–108)
CHLORIDE SERPL-SCNC: 104 MMOL/L — SIGNIFICANT CHANGE UP (ref 96–108)
CO2 SERPL-SCNC: 24 MMOL/L — SIGNIFICANT CHANGE UP (ref 22–31)
CO2 SERPL-SCNC: 26 MMOL/L — SIGNIFICANT CHANGE UP (ref 22–31)
CREAT SERPL-MCNC: 3.52 MG/DL — HIGH (ref 0.5–1.3)
CREAT SERPL-MCNC: 5.68 MG/DL — HIGH (ref 0.5–1.3)
GLUCOSE SERPL-MCNC: 143 MG/DL — HIGH (ref 70–99)
GLUCOSE SERPL-MCNC: 167 MG/DL — HIGH (ref 70–99)
HCT VFR BLD CALC: 28.5 % — LOW (ref 39–50)
HGB BLD-MCNC: 9.3 G/DL — LOW (ref 13–17)
POTASSIUM SERPL-MCNC: 3.6 MMOL/L — SIGNIFICANT CHANGE UP (ref 3.5–5.3)
POTASSIUM SERPL-MCNC: 4.5 MMOL/L — SIGNIFICANT CHANGE UP (ref 3.5–5.3)
POTASSIUM SERPL-SCNC: 3.6 MMOL/L — SIGNIFICANT CHANGE UP (ref 3.5–5.3)
POTASSIUM SERPL-SCNC: 4.5 MMOL/L — SIGNIFICANT CHANGE UP (ref 3.5–5.3)
PROT SERPL-MCNC: 6.2 GM/DL — SIGNIFICANT CHANGE UP (ref 6–8.3)
SARS-COV-2 RNA SPEC QL NAA+PROBE: SIGNIFICANT CHANGE UP
SODIUM SERPL-SCNC: 134 MMOL/L — LOW (ref 135–145)
SODIUM SERPL-SCNC: 138 MMOL/L — SIGNIFICANT CHANGE UP (ref 135–145)

## 2020-05-11 PROCEDURE — 93306 TTE W/DOPPLER COMPLETE: CPT | Mod: 26

## 2020-05-11 PROCEDURE — 99233 SBSQ HOSP IP/OBS HIGH 50: CPT

## 2020-05-11 RX ORDER — POLYETHYLENE GLYCOL 3350 17 G/17G
17 POWDER, FOR SOLUTION ORAL DAILY
Refills: 0 | Status: DISCONTINUED | OUTPATIENT
Start: 2020-05-11 | End: 2020-05-15

## 2020-05-11 RX ORDER — ERYTHROPOIETIN 10000 [IU]/ML
10000 INJECTION, SOLUTION INTRAVENOUS; SUBCUTANEOUS
Refills: 0 | Status: DISCONTINUED | OUTPATIENT
Start: 2020-05-11 | End: 2020-05-15

## 2020-05-11 RX ADMIN — Medication 75 MICROGRAM(S): at 06:07

## 2020-05-11 RX ADMIN — Medication 30 MILLIGRAM(S): at 21:35

## 2020-05-11 RX ADMIN — ERYTHROPOIETIN 10000 UNIT(S): 10000 INJECTION, SOLUTION INTRAVENOUS; SUBCUTANEOUS at 09:23

## 2020-05-11 RX ADMIN — CARVEDILOL PHOSPHATE 25 MILLIGRAM(S): 80 CAPSULE, EXTENDED RELEASE ORAL at 06:07

## 2020-05-11 RX ADMIN — PANTOPRAZOLE SODIUM 10 MG/HR: 20 TABLET, DELAYED RELEASE ORAL at 16:08

## 2020-05-11 RX ADMIN — Medication 25 MILLIGRAM(S): at 06:07

## 2020-05-11 RX ADMIN — FINASTERIDE 5 MILLIGRAM(S): 5 TABLET, FILM COATED ORAL at 17:53

## 2020-05-11 RX ADMIN — PANTOPRAZOLE SODIUM 10 MG/HR: 20 TABLET, DELAYED RELEASE ORAL at 06:13

## 2020-05-11 RX ADMIN — Medication 30 MILLIGRAM(S): at 06:07

## 2020-05-11 RX ADMIN — POLYETHYLENE GLYCOL 3350 17 GRAM(S): 17 POWDER, FOR SOLUTION ORAL at 17:53

## 2020-05-11 RX ADMIN — TAMSULOSIN HYDROCHLORIDE 0.4 MILLIGRAM(S): 0.4 CAPSULE ORAL at 21:35

## 2020-05-11 NOTE — PROGRESS NOTE ADULT - ASSESSMENT
85M with subacute GIB on anticoagulation/NSAIDs. Semi-recent colonoscopy done at OSH.     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

## 2020-05-11 NOTE — PROGRESS NOTE ADULT - ASSESSMENT
84 yo male with Hx. 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 The patient states he noted blood in the stool intermittently for the past 2 weeks. He states he could not get a doctors appointment. He states he lives with her daughter who is administering his medications and he does not know what medicine he is taking.  Hg in the ED was 5.9 and he was started on blood transfusion 3 PRBC  via dialysis in the ED.   GI consult obtained from Dr. Diaz and Porotonix drip was started.  he had GI eval. 2 years ago.    A/P  Acute symptomatic anemia 2/2 GI bleed 2/2 NOAC  AFIB on anticoagulation with ELiquis at home; h/o TIA  DM HTN   ESRD on HD  Hypothyroidism  h/o cutaneous T Cell lymphoma  Gout  CAD, h/o CHF (diastolic based on prev ECHO), Pulm HTN   BPH      Plan:      admit to medicine,   STOP ELIQUIS, s/p 3 units PRBC in the ED via dialysis,catheter  Started on Protonix drip  avoid NSAIDS antiplatelets anticoagulants   cont CCB BB for rate control  cont hydralazine  cont finasteride and tamsulosin  cont synthroid  cont HD per schedule, pt seen by Dr Alvarado   for VTE px - SCDs only, OOB to chair   Consults: GI , Cardiology Dr Tremayne PRUETT pending  NPO P MN FOR EGD IN AM, await 2nd covid results     discussed with TIFFANIE BARRERA and daughter Brigitte

## 2020-05-11 NOTE — PROGRESS NOTE ADULT - SUBJECTIVE AND OBJECTIVE BOX
NEPHROLOGY INTERVAL HPI/OVERNIGHT EVENTS:  05-11-20 @ 08:59    5/11 MK   seen at HD, no c/o for egd in am after 2 neg covid test   no abd pain     84 yo male with a PMH htn, hld, dm, hypothyroid, t cell lymphoma, a fib on plavix and eliquis, s/p TAVR, ESRD (dialysis m/w/f, last dialysis was friday), was sent in by his doctor for low hemoglobin. Pt states he noticed black stool, intermittent for 15 days and feeling weak. Denies f/c/sweating, cough, cp, sob, n/v/d.  Pt on HD 3 x a week on MWF  found to have low HGB and sent to ED  Dx with active GI bleed, guaiac + stool  CXR clear  h/o transfusion many years ago  recent GI w/u 2 yrs ago negative as per pt        PAST MEDICAL & SURGICAL HISTORY:  BPH (benign prostatic hyperplasia)  Cutaneous T-cell lymphoma  Hypothyroid  Hypercholesteremia  DM (diabetes mellitus)  HTN (hypertension)  S/P TAVR (transcatheter aortic valve replacement)  H/O hernia repair      MEDICATIONS  (STANDING):  carvedilol 25 milliGRAM(s) Oral every 12 hours  cholecalciferol 2000 Unit(s) Oral daily  dextrose 5%. 1000 milliLiter(s) (50 mL/Hr) IV Continuous <Continuous>  dextrose 50% Injectable 12.5 Gram(s) IV Push once  dextrose 50% Injectable 25 Gram(s) IV Push once  dextrose 50% Injectable 25 Gram(s) IV Push once  diltiazem    Tablet 30 milliGRAM(s) Oral three times a day  finasteride 5 milliGRAM(s) Oral daily  hydrALAZINE 25 milliGRAM(s) Oral three times a day  insulin lispro (HumaLOG) corrective regimen sliding scale   SubCutaneous three times a day before meals  levothyroxine 75 MICROGram(s) Oral daily  multivitamin 1 Tablet(s) Oral daily  pantoprazole Infusion 8 mG/Hr (10 mL/Hr) IV Continuous <Continuous>  tamsulosin 0.4 milliGRAM(s) Oral at bedtime    MEDICATIONS  (PRN):  dextrose 40% Gel 15 Gram(s) Oral once PRN Blood Glucose LESS THAN 70 milliGRAM(s)/deciliter  glucagon  Injectable 1 milliGRAM(s) IntraMuscular once PRN Glucose LESS THAN 70 milligrams/deciliter  polyethylene glycol 3350 17 Gram(s) Oral daily PRN Constipation      Allergies    No Known Allergies    Intolerances        I&O's Detail    10 May 2020 07:01  -  11 May 2020 07:00  --------------------------------------------------------  IN:    pantoprazole Infusion: 190 mL  Total IN: 190 mL    OUT:  Total OUT: 0 mL    Total NET: 190 mL          .    Patient was seen and evaluated on dialysis.   Patient is tolerating the procedure well.   Continue dialysis:   Dialyzer:  revaclear 300 on k protocol with uf goal of 1 kg   via CVC     Vital Signs Last 24 Hrs  T(C): 36.1 (11 May 2020 08:27), Max: 37.1 (10 May 2020 17:48)  T(F): 97 (11 May 2020 08:27), Max: 98.8 (10 May 2020 17:48)  HR: 59 (11 May 2020 08:43) (59 - 90)  BP: 86/40 (11 May 2020 08:43) (86/40 - 120/55)  BP(mean): --  RR: 18 (11 May 2020 08:43) (18 - 18)  SpO2: 100% (11 May 2020 06:05) (98% - 100%)  Daily     Daily     PHYSICAL EXAM:  General: alert. awake NAD  HEENT: MMM  CV: s1s2 rrr  LUNGS: B/L CTA  EXT: no edema    LABS:                        9.3    x     )-----------( x        ( 11 May 2020 07:56 )             28.5     05-11    138  |  104  |  49<H>  ----------------------------<  143<H>  4.5   |  24  |  5.68<H>    Ca    8.7      11 May 2020 07:56  Phos  3.2     05-10    TPro  6.2  /  Alb  2.6<L>  /  TBili  0.7  /  DBili  x   /  AST  22  /  ALT  22  /  AlkPhos  61  05-11    PT/INR - ( 09 May 2020 15:47 )   PT: 16.4 sec;   INR: 1.46 ratio         PTT - ( 09 May 2020 15:47 )  PTT:33.9 sec

## 2020-05-11 NOTE — PROGRESS NOTE ADULT - ASSESSMENT
86 yo male with a PMH htn, hld, dm, hypothyroid, t cell lymphoma, a fib on plavix and eliquis, s/p TAVR, ESRD (dialysis m/w/f, last dialysis was friday), was sent in by his doctor for low hemoglobin. Pt states he noticed black stool, intermittent for 15 days and feeling weak. Denies f/c/sweating, cough, cp, sob, n/v/d.  Pt on HD 3 x a week on MWF  found to have low HGB and sent to ED  Dx with active GI bleed, guaiac + stool  CXR clear    a/p  Active GI bleed   will dialyze and give 3 u prbc x 3 hours treatment  since pt is actively bleeding, no BRBPR but + guaiac in rectum    5/11 MK   - ESRD tolerating HD MWF schedule    limited uf with HD   - HTN  dc hydralazine for now  and monitor trend of the bp  - Anemia with component of Acute blood loss    colon neg 1 yr ago, egd after 2 neg covid     start epogen 10 K sq

## 2020-05-11 NOTE — PROGRESS NOTE ADULT - SUBJECTIVE AND OBJECTIVE BOX
HPI: The patient is an 84 yo male with Hx. 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 The patient states he noted blood in the stool intermittently for the past 2 weeks. He states he could not get a doctors appointment. He states he lives with her daughter who is administering his medications and he does not know what medicine he is taking.  Hg in the ED was 5.9 and he was started on blood transfusion 3 PRBC  via dialysis in the ED.   GI consult obtained from Dr. Diaz and Porotonix drip was started.  he had GI eval. 2 years ago.  PMHx: : ESRD on dialysis m/w/f, HTN, DM2, Hypothyroid, cutaneous T Cell lymphoma, AS, SANG, SSCA , TIA, Gout, CHF, CAD, BPH , AS, A. fib  PSHx: TAVR,, hernia repair.     5/10 - feels better after 3U PRBCs; no cp palps sob or lightheadedness  5/11- at HD< no cp palps sob     PHYSICAL EXAM:  GENERAL: NAD, undergoing HD   EYES: EOMI, PERRLA, normal sclera  ENT: Moist mucous membranes  NECK: Supple, No JVD, no nuchal rigidity  CHEST/LUNG: Clear to auscultation bilaterally; RT CHEST WALL CATHETER   HEART: Regular rate and rhythm; No murmurs, rubs, or gallops  ABDOMEN: Bowel sounds present; Soft, Nontender, Nondistended. No hepatomegaly  EXTREMITIES:  no pitting bilaterally  NERVOUS SYSTEM:  Alert & Oriented X3, speech clear. No focal motor or sensory deficits  MSK: FROM all 4 extremities, full and equal strength    LABS: All Labs Reviewed:                        9.3    x     )-----------( x        ( 11 May 2020 07:56 )             28.5     05-11    138  |  104  |  49<H>  ----------------------------<  143<H>  4.5   |  24  |  5.68<H>      carvedilol 25 milliGRAM(s) Oral every 12 hours  cholecalciferol 2000 Unit(s) Oral daily  diltiazem    Tablet 30 milliGRAM(s) Oral three times a day  epoetin-shakila-epbx (RETACRIT) Injectable 66854 Unit(s) SubCutaneous every 7 days  finasteride 5 milliGRAM(s) Oral daily  glucagon  Injectable 1 milliGRAM(s) IntraMuscular once PRN  insulin lispro (HumaLOG) corrective regimen sliding scale   SubCutaneous three times a day before meals  levothyroxine 75 MICROGram(s) Oral daily  multivitamin 1 Tablet(s) Oral daily  pantoprazole Infusion 8 mG/Hr IV Continuous <Continuous>  polyethylene glycol 3350 17 Gram(s) Oral daily PRN  tamsulosin 0.4 milliGRAM(s) Oral at bedtime

## 2020-05-11 NOTE — PROGRESS NOTE ADULT - SUBJECTIVE AND OBJECTIVE BOX
Patient is a 85y old  Male who presents with a chief complaint of GI bleed (10 May 2020 13:50)      HPI:  HPI: The patient is an 86 yo male with Hx. 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 The patient states he noted blood in the stool intermittently for the past 2 weeks. He states he could not get a doctors appointment. He states he lives with her daughter who is administering his medications and he does not know what medicine he is taking.  Hg in the ED was 5.9 and he was started on blood transfusion 3 PRBC  via dialysis in the ED.     Pt states he has noted black stools on and off for the past month or more. Denies abdominal pain but does c/o hard stools and rectal pain with stool passage. He had a colonoscopy within the past 1-2 years ago per daughter however pt states last colonoscopy was about 4 y ago. No hematochezia. No dysphagia, no nausea. May takes NSAIDs also.  Given 3 units PRBC and has improved with hgb now 9.2 (portia 5.9).    pt comf s blood in stool  neg abd pain   rare N        PMHx: : ESRD on dialysis m/w/f, HTN, DM2, Hypothyroid, cutaneous T Cell lymphoma, AS, SANG, SSCA , TIA, Gout, CHF, CAD, BPH , AS, A. fib    PSHx: TAVAR,, hernia repair.     Family Hx: fater  of skin CA, mother  of MI,     Social Hx.: not smoking, no alcohol use      Lab:                       5.9    7.14  )-----------( 149      ( 09 May 2020 17:45 )             19.5           140  |  100  |  42<H>  ----------------------------<  131<H>  3.6   |  32<H>  |  5.20<H>    Ca    8.3<L>      09 May 2020 15:47    TPro  6.3  /  Alb  2.7<L>  /  TBili  0.4  /  DBili  x   /  AST  24  /  ALT  23  /  AlkPhos  79  - (09 May 2020 19:00)      PAST MEDICAL & SURGICAL HISTORY:  BPH (benign prostatic hyperplasia)  Cutaneous T-cell lymphoma  Hypothyroid  Hypercholesteremia  DM (diabetes mellitus)  HTN (hypertension)  S/P TAVR (transcatheter aortic valve replacement)  H/O hernia repair      MEDICATIONS  (STANDING):  carvedilol 25 milliGRAM(s) Oral every 12 hours  cholecalciferol 2000 Unit(s) Oral daily  dextrose 5%. 1000 milliLiter(s) (50 mL/Hr) IV Continuous <Continuous>  dextrose 50% Injectable 12.5 Gram(s) IV Push once  dextrose 50% Injectable 25 Gram(s) IV Push once  dextrose 50% Injectable 25 Gram(s) IV Push once  diltiazem    Tablet 30 milliGRAM(s) Oral three times a day  finasteride 5 milliGRAM(s) Oral daily  hydrALAZINE 25 milliGRAM(s) Oral three times a day  insulin lispro (HumaLOG) corrective regimen sliding scale   SubCutaneous three times a day before meals  levothyroxine 75 MICROGram(s) Oral daily  multivitamin 1 Tablet(s) Oral daily  pantoprazole Infusion 8 mG/Hr (10 mL/Hr) IV Continuous <Continuous>  tamsulosin 0.4 milliGRAM(s) Oral at bedtime    MEDICATIONS  (PRN):  dextrose 40% Gel 15 Gram(s) Oral once PRN Blood Glucose LESS THAN 70 milliGRAM(s)/deciliter  glucagon  Injectable 1 milliGRAM(s) IntraMuscular once PRN Glucose LESS THAN 70 milligrams/deciliter  polyethylene glycol 3350 17 Gram(s) Oral daily PRN Constipation      Allergies    No Known Allergies    Intolerances        SOCIAL HISTORY:NC    FAMILY HISTORY:  No pertinent family history in first degree relatives      REVIEW OF SYSTEMS:    CONSTITUTIONAL: pos weakness, neg fevers or chills  EYES/ENT: No visual changes;  No vertigo or throat pain   NECK: No pain or stiffness  RESPIRATORY: No cough, wheezing, hemoptysis; No shortness of breath  CARDIOVASCULAR: No chest pain or palpitations  GENITOURINARY: No dysuria, frequency or hematuria  NEUROLOGICAL: No numbness or weakness  SKIN: No itching, burning, rashes, or lesions   All other review of systems is negative unless indicated above.    Vital Signs Last 24 Hrs  T(C): 35.9 (11 May 2020 06:05), Max: 37.1 (10 May 2020 17:48)  T(F): 96.6 (11 May 2020 06:05), Max: 98.8 (10 May 2020 17:48)  HR: 75 (11 May 2020 06:05) (69 - 90)  BP: 120/55 (11 May 2020 06:05) (92/41 - 120/55)  BP(mean): --  RR: 18 (11 May 2020 06:05) (18 - 18)  SpO2: 100% (11 May 2020 06:05) (98% - 100%)    PHYSICAL EXAM:    Constitutional: NAD, well-developed  HEENT: EOMI, throat clear  Neck: No LAD, supple  Respiratory: CTA and P  Cardiovascular: S1 and S2, RRR, no M  Gastrointestinal: BS+, soft, mild epig tend, ND, neg HSM,  Extremities: No peripheral edema, neg clubing, cyanosis  Vascular: 2+ peripheral pulses  Neurological: A/O x 3, no focal deficits  Psychiatric: Normal mood, normal affect  Skin: No rashes    LABS:  CBC Full  -  ( 11 May 2020 07:56 )  WBC Count : x  RBC Count : x  Hemoglobin : 9.3 g/dL  Hematocrit : 28.5 %  Platelet Count - Automated : x  Mean Cell Volume : x  Mean Cell Hemoglobin : x  Mean Cell Hemoglobin Concentration : x  Auto Neutrophil # : x  Auto Lymphocyte # : x  Auto Monocyte # : x  Auto Eosinophil # : x  Auto Basophil # : x  Auto Neutrophil % : x  Auto Lymphocyte % : x  Auto Monocyte % : x  Auto Eosinophil % : x  Auto Basophil % : x    05-10    138  |  102  |  24<H>  ----------------------------<  133<H>  3.1<L>   |  28  |  3.33<H>    Ca    8.1<L>      10 May 2020 06:59  Phos  3.2     -10    TPro  6.3  /  Alb  2.7<L>  /  TBili  0.4  /  DBili  x   /  AST  24  /  ALT  23  /  AlkPhos  79  05-09    PT/INR - ( 09 May 2020 15:47 )   PT: 16.4 sec;   INR: 1.46 ratio         PTT - ( 09 May 2020 15:47 )  PTT:33.9 sec    0509 @ 15:47  Hep A Igm Nonreact  Hep A total ab, IgA and M --  Hep B core Ab total --  Hep B core IgM Nonreact  Hep B DNA PCR --  Hep BSAg --  Hep BSAb --  Hep BSAb --  HCV Ab --  HCV RNA Log --  HCV RNA interp --                RADIOLOGY & ADDITIONAL STUDIES:  < from: Xray Chest 1 View-PORTABLE IMMEDIATE (20 @ 15:55) >  EXAM:  XR CHEST PORTABLE IMMED 1V                            PROCEDURE DATE:  2020          INTERPRETATION:  History: Weakness dyspnea    Chest:  one view.      Comparison: 2016    AP radiograph of the chest demonstrates no evidence of infiltrate, pleural effusion or vascular congestion. No atelectasis is seen. RIGHT central catheter placed with tip in RIGHT atrium. Aortic valve prosthesis is noted. The cardiac silhouette is enlarged in size. Osseous structures are intact.    Impression: No active pulmonary disease. Cardiomegaly.                FAHAD EVANS M.D., ATTENDING RADIOLOGIST  This document has been electronically signed. May  9 2020  4:01PM    < end of copied text >

## 2020-05-12 ENCOUNTER — RESULT REVIEW (OUTPATIENT)
Age: 85
End: 2020-05-12

## 2020-05-12 DIAGNOSIS — Z95.5 PRESENCE OF CORONARY ANGIOPLASTY IMPLANT AND GRAFT: Chronic | ICD-10-CM

## 2020-05-12 DIAGNOSIS — Z98.890 OTHER SPECIFIED POSTPROCEDURAL STATES: Chronic | ICD-10-CM

## 2020-05-12 LAB
ANION GAP SERPL CALC-SCNC: 10 MMOL/L — SIGNIFICANT CHANGE UP (ref 5–17)
BUN SERPL-MCNC: 36 MG/DL — HIGH (ref 7–23)
CALCIUM SERPL-MCNC: 8.7 MG/DL — SIGNIFICANT CHANGE UP (ref 8.5–10.1)
CHLORIDE SERPL-SCNC: 106 MMOL/L — SIGNIFICANT CHANGE UP (ref 96–108)
CO2 SERPL-SCNC: 26 MMOL/L — SIGNIFICANT CHANGE UP (ref 22–31)
CREAT SERPL-MCNC: 4.42 MG/DL — HIGH (ref 0.5–1.3)
GLUCOSE SERPL-MCNC: 122 MG/DL — HIGH (ref 70–99)
HCT VFR BLD CALC: 26.2 % — LOW (ref 39–50)
HGB BLD-MCNC: 8.5 G/DL — LOW (ref 13–17)
MAGNESIUM SERPL-MCNC: 2.1 MG/DL — SIGNIFICANT CHANGE UP (ref 1.6–2.6)
POTASSIUM SERPL-MCNC: 4.3 MMOL/L — SIGNIFICANT CHANGE UP (ref 3.5–5.3)
POTASSIUM SERPL-SCNC: 4.3 MMOL/L — SIGNIFICANT CHANGE UP (ref 3.5–5.3)
SODIUM SERPL-SCNC: 142 MMOL/L — SIGNIFICANT CHANGE UP (ref 135–145)

## 2020-05-12 PROCEDURE — 88305 TISSUE EXAM BY PATHOLOGIST: CPT | Mod: 26

## 2020-05-12 PROCEDURE — 99233 SBSQ HOSP IP/OBS HIGH 50: CPT

## 2020-05-12 PROCEDURE — 88312 SPECIAL STAINS GROUP 1: CPT | Mod: 26

## 2020-05-12 RX ORDER — FENTANYL CITRATE 50 UG/ML
50 INJECTION INTRAVENOUS
Refills: 0 | Status: DISCONTINUED | OUTPATIENT
Start: 2020-05-12 | End: 2020-05-12

## 2020-05-12 RX ORDER — ONDANSETRON 8 MG/1
4 TABLET, FILM COATED ORAL ONCE
Refills: 0 | Status: DISCONTINUED | OUTPATIENT
Start: 2020-05-12 | End: 2020-05-12

## 2020-05-12 RX ORDER — OXYCODONE HYDROCHLORIDE 5 MG/1
5 TABLET ORAL ONCE
Refills: 0 | Status: DISCONTINUED | OUTPATIENT
Start: 2020-05-12 | End: 2020-05-12

## 2020-05-12 RX ADMIN — Medication 75 MICROGRAM(S): at 05:10

## 2020-05-12 RX ADMIN — FINASTERIDE 5 MILLIGRAM(S): 5 TABLET, FILM COATED ORAL at 12:41

## 2020-05-12 RX ADMIN — POLYETHYLENE GLYCOL 3350 17 GRAM(S): 17 POWDER, FOR SOLUTION ORAL at 12:40

## 2020-05-12 RX ADMIN — CARVEDILOL PHOSPHATE 25 MILLIGRAM(S): 80 CAPSULE, EXTENDED RELEASE ORAL at 05:10

## 2020-05-12 RX ADMIN — PANTOPRAZOLE SODIUM 10 MG/HR: 20 TABLET, DELAYED RELEASE ORAL at 01:30

## 2020-05-12 RX ADMIN — Medication 30 MILLIGRAM(S): at 22:07

## 2020-05-12 RX ADMIN — PANTOPRAZOLE SODIUM 10 MG/HR: 20 TABLET, DELAYED RELEASE ORAL at 22:07

## 2020-05-12 RX ADMIN — Medication 1 TABLET(S): at 12:41

## 2020-05-12 RX ADMIN — Medication 30 MILLIGRAM(S): at 05:10

## 2020-05-12 RX ADMIN — Medication 2: at 12:45

## 2020-05-12 RX ADMIN — CARVEDILOL PHOSPHATE 25 MILLIGRAM(S): 80 CAPSULE, EXTENDED RELEASE ORAL at 17:30

## 2020-05-12 RX ADMIN — Medication 2000 UNIT(S): at 12:40

## 2020-05-12 RX ADMIN — TAMSULOSIN HYDROCHLORIDE 0.4 MILLIGRAM(S): 0.4 CAPSULE ORAL at 22:07

## 2020-05-12 RX ADMIN — PANTOPRAZOLE SODIUM 10 MG/HR: 20 TABLET, DELAYED RELEASE ORAL at 12:45

## 2020-05-12 NOTE — PROGRESS NOTE ADULT - ASSESSMENT
84 yo male with Hx. 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 The patient states he noted blood in the stool intermittently for the past 2 weeks. He states he could not get a doctors appointment. He states he lives with her daughter who is administering his medications and he does not know what medicine he is taking.  Hg in the ED was 5.9 and he was started on blood transfusion 3 PRBC  via dialysis in the ED.   GI consult obtained from Dr. Diaz and Porotonix drip was started.  he had GI eval. 2 years ago.    A/P  Acute symptomatic anemia 2/2 GI bleed 2/2 NOAC  AFIB on anticoagulation with ELiquis at home; h/o TIA  DM HTN   ESRD on HD  Hypothyroidism  h/o cutaneous T Cell lymphoma  Gout  CAD, h/o CHF (diastolic based on prev ECHO), Pulm HTN   BPH      Plan:      admit to medicine,   STOP ELIQUIS, s/p 3 units PRBC in the ED via dialysis,catheter  Started on Protonix drip  avoid NSAIDS antiplatelets anticoagulants   cont CCB BB for rate control  cont hydralazine  cont finasteride and tamsulosin  cont synthroid  cont HD per schedule, pt seen by Dr Alvarado   for VTE px - SCDs only, OOB to chair   Consults: GI , Cardiology Dr Tremayne PRUETT pending  NPO P MN FOR EGD IN AM, await 2nd covid results     discussed with TIFFANIE BARRERA and daughter Brigitte  86 yo male with Hx. 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 The patient states he noted blood in the stool intermittently for the past 2 weeks. He states he could not get a doctors appointment. He states he lives with her daughter who is administering his medications and he does not know what medicine he is taking.  Hg in the ED was 5.9 and he was started on blood transfusion 3 PRBC  via dialysis in the ED.   GI consult obtained from Dr. Diaz and Porotonix drip was started.  he had GI eval. 2 years ago.    A/P  Acute symptomatic anemia 2/2 GI bleed 2/2 NOAC  AFIB on anticoagulation with ELiquis at home; h/o TIA  DM HTN   ESRD on HD  Hypothyroidism  h/o cutaneous T Cell lymphoma  Gout  CAD, h/o CHF (diastolic based on prev ECHO), Pulm HTN   BPH      Plan:      admit to medicine,   STOP ELIQUIS, s/p 3 units PRBC in the ED via dialysis,catheter  On Protonix drip  s/p EGD 5/12 - AVMs noted, discussed with DR Fonseca - discussed with daughter regarding risks benefits of anticoagulation - she understands the risk of stroke if AC is held and the risk of massive bleed on AC. SHe would prefer to go ahead with AC - explained we would start low dose eliquis if H&H is stable tomorrow and consider adding ASA after that if he continues to be stable.  avoid NSAIDS antiplatelets anticoagulants   cont CCB BB for rate control  cont hydralazine  cont finasteride and tamsulosin  cont synthroid  cont HD per schedule, pt seen by Dr Alvarado   for VTE px - SCDs only, OOB to chair   Consults: GI  , Cardiology Dr Casper        discussed with RN GI and daughter Brigitte   left a msg for Dr Paez pt's cardiologist  to update regarding the above

## 2020-05-12 NOTE — PROGRESS NOTE ADULT - SUBJECTIVE AND OBJECTIVE BOX
HPI: The patient is an 84 yo male with Hx. 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 The patient states he noted blood in the stool intermittently for the past 2 weeks. He states he could not get a doctors appointment. He states he lives with her daughter who is administering his medications and he does not know what medicine he is taking.  Hg in the ED was 5.9 and he was started on blood transfusion 3 PRBC  via dialysis in the ED.   GI consult obtained from Dr. Diaz and Porotonix drip was started.  he had GI eval. 2 years ago.  PMHx: : ESRD on dialysis m/w/f, HTN, DM2, Hypothyroid, cutaneous T Cell lymphoma, AS, SANG, SSCA , TIA, Gout, CHF, CAD, BPH , AS, A. fib  PSHx: TAVR,, hernia repair.     5/10 - feels better after 3U PRBCs; no cp palps sob or lightheadedness  5/11- at HD< no cp palps sob     PHYSICAL EXAM:  GENERAL: NAD, undergoing HD   EYES: EOMI, PERRLA, normal sclera  ENT: Moist mucous membranes  NECK: Supple, No JVD, no nuchal rigidity  CHEST/LUNG: Clear to auscultation bilaterally; RT CHEST WALL CATHETER   HEART: Regular rate and rhythm; No murmurs, rubs, or gallops  ABDOMEN: Bowel sounds present; Soft, Nontender, Nondistended. No hepatomegaly  EXTREMITIES:  no pitting bilaterally  NERVOUS SYSTEM:  Alert & Oriented X3, speech clear. No focal motor or sensory deficits  MSK: FROM all 4 extremities, full and equal strength    LABS: All Labs Reviewed:                        9.3    x     )-----------( x        ( 11 May 2020 07:56 )             28.5     05-11    138  |  104  |  49<H>  ----------------------------<  143<H>  4.5   |  24  |  5.68<H>      carvedilol 25 milliGRAM(s) Oral every 12 hours  cholecalciferol 2000 Unit(s) Oral daily  diltiazem    Tablet 30 milliGRAM(s) Oral three times a day  epoetin-shakila-epbx (RETACRIT) Injectable 41907 Unit(s) SubCutaneous every 7 days  finasteride 5 milliGRAM(s) Oral daily  glucagon  Injectable 1 milliGRAM(s) IntraMuscular once PRN  insulin lispro (HumaLOG) corrective regimen sliding scale   SubCutaneous three times a day before meals  levothyroxine 75 MICROGram(s) Oral daily  multivitamin 1 Tablet(s) Oral daily  pantoprazole Infusion 8 mG/Hr IV Continuous <Continuous>  polyethylene glycol 3350 17 Gram(s) Oral daily PRN  tamsulosin 0.4 milliGRAM(s) Oral at bedtime HPI: The patient is an 84 yo male with Hx. 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 The patient states he noted blood in the stool intermittently for the past 2 weeks. He states he could not get a doctors appointment. He states he lives with her daughter who is administering his medications and he does not know what medicine he is taking.  Hg in the ED was 5.9 and he was started on blood transfusion 3 PRBC  via dialysis in the ED.   GI consult obtained from Dr. Diaz and Porotonix drip was started.  he had GI eval. 2 years ago.  PMHx: : ESRD on dialysis m/w/f, HTN, DM2, Hypothyroid, cutaneous T Cell lymphoma, AS, SANG, SSCA , TIA, Gout, CHF, CAD, BPH , AS, A. fib  PSHx: TAVR,, hernia repair.     5/10 - feels better after 3U PRBCs; no cp palps sob or lightheadedness  5/11- at HD< no cp palps sob   5/12 - s/p EGD, noted to have AVMs, pt reports he had a dark bowel movement after coming back - no lightheadedness or cp     PHYSICAL EXAM:  GENERAL: NAD, undergoing HD   EYES: EOMI, PERRLA, normal sclera  ENT: Moist mucous membranes  NECK: Supple, No JVD, no nuchal rigidity  CHEST/LUNG: Clear to auscultation bilaterally; RT CHEST WALL CATHETER   HEART: Regular rate and rhythm; No murmurs, rubs, or gallops  ABDOMEN: Bowel sounds present; Soft, Nontender, Nondistended. No hepatomegaly  EXTREMITIES:  no pitting bilaterally  NERVOUS SYSTEM:  Alert & Oriented X3, speech clear. No focal motor or sensory deficits  MSK: FROM all 4 extremities, full and equal strength      LABS: All Labs Reviewed:                        8.5    x     )-----------( x        ( 12 May 2020 07:04 )             26.2     05-12    142  |  106  |  36<H>  ----------------------------<  122<H>  4.3   |  26  |  4.42<H>    carvedilol 25 milliGRAM(s) Oral every 12 hours  cholecalciferol 2000 Unit(s) Oral daily  diltiazem    Tablet 30 milliGRAM(s) Oral three times a day  epoetin-shakila-epbx (RETACRIT) Injectable 87308 Unit(s) SubCutaneous every 7 days  finasteride 5 milliGRAM(s) Oral daily  glucagon  Injectable 1 milliGRAM(s) IntraMuscular once PRN  insulin lispro (HumaLOG) corrective regimen sliding scale   SubCutaneous three times a day before meals  levothyroxine 75 MICROGram(s) Oral daily  multivitamin 1 Tablet(s) Oral daily  pantoprazole Infusion 8 mG/Hr IV Continuous <Continuous>  polyethylene glycol 3350 17 Gram(s) Oral daily PRN  polyethylene glycol 3350 17 Gram(s) Oral daily  tamsulosin 0.4 milliGRAM(s) Oral at bedtime

## 2020-05-12 NOTE — PROGRESS NOTE ADULT - ASSESSMENT
84 yo male with a PMH htn, hld, dm, hypothyroid, t cell lymphoma, a fib on plavix and eliquis, s/p TAVR, ESRD (dialysis m/w/f, last dialysis was friday), was sent in by his doctor for low hemoglobin. Pt states he noticed black stool, intermittent for 15 days and feeling weak. Denies f/c/sweating, cough, cp, sob, n/v/d.  Pt on HD 3 x a week on MWF  found to have low HGB and sent to ED  Dx with active GI bleed, guaiac + stool  CXR clear    a/p  Active GI bleed   will dialyze and give 3 u prbc x 3 hours treatment  since pt is actively bleeding, no BRBPR but + guaiac in rectum    5/11 MK   - ESRD tolerating HD MWF schedule    limited uf with HD   - HTN  dc hydralazine for now  and monitor trend of the bp  - Anemia with component of Acute blood loss    colon neg 1 yr ago, egd after 2 neg covid     start epogen 10 K sq     5/12  for HD in am  s/p EGD, AVM  no new events

## 2020-05-12 NOTE — PROGRESS NOTE ADULT - SUBJECTIVE AND OBJECTIVE BOX
NEPHROLOGY INTERVAL HPI/OVERNIGHT EVENTS:    5/12  s/p EGD  "gastric incisural AVM SP argon plasma and clipping with ablation of AVM"  as per GI note      5/11 MK   seen at HD, no c/o for egd in am after 2 neg covid test   no abd pain     84 yo male with a PMH htn, hld, dm, hypothyroid, t cell lymphoma, a fib on plavix and eliquis, s/p TAVR, ESRD (dialysis m/w/f, last dialysis was friday), was sent in by his doctor for low hemoglobin. Pt states he noticed black stool, intermittent for 15 days and feeling weak. Denies f/c/sweating, cough, cp, sob, n/v/d.  Pt on HD 3 x a week on MWF  found to have low HGB and sent to ED  Dx with active GI bleed, guaiac + stool  CXR clear  h/o transfusion many years ago  recent GI w/u 2 yrs ago negative as per pt        PAST MEDICAL & SURGICAL HISTORY:  BPH (benign prostatic hyperplasia)  Cutaneous T-cell lymphoma  Hypothyroid  Hypercholesteremia  DM (diabetes mellitus)  HTN (hypertension)  S/P TAVR (transcatheter aortic valve replacement)  H/O hernia repair            Allergies    No Known Allergies    Intolerances    I&O's Detail    12 May 2020 07:01  -  12 May 2020 12:15  --------------------------------------------------------  IN:    Other: 400 mL  Total IN: 400 mL    OUT:  Total OUT: 0 mL    Total NET: 400 mL    Vital Signs Last 24 Hrs  T(C): 36.6 (12 May 2020 11:53), Max: 37.2 (12 May 2020 09:45)  T(F): 97.8 (12 May 2020 11:53), Max: 98.9 (12 May 2020 09:45)  HR: 68 (12 May 2020 11:53) (58 - 83)  BP: 98/31 (12 May 2020 11:53) (96/57 - 112/72)  BP(mean): --  RR: 16 (12 May 2020 11:53) (14 - 18)  SpO2: 100% (12 May 2020 11:53) (98% - 100%)    PHYSICAL EXAM:  General: sleeping   HEENT: MMM  CV: s1s2 rrr  LUNGS: B/L CTA  EXT: no edema    LABS:                I&O's Detail    12 May 2020 07:01  -  12 May 2020 12:19  --------------------------------------------------------  IN:    Other: 400 mL  Total IN: 400 mL    OUT:  Total OUT: 0 mL    Total NET: 400 mL                            8.5    x     )-----------( x        ( 12 May 2020 07:04 )             26.2                         9.3    x     )-----------( x        ( 11 May 2020 07:56 )             28.5                         9.2    7.23  )-----------( 143      ( 10 May 2020 06:59 )             28.6

## 2020-05-12 NOTE — CHART NOTE - NSCHARTNOTEFT_GEN_A_CORE
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

## 2020-05-13 LAB
ALBUMIN SERPL ELPH-MCNC: 2.3 G/DL — LOW (ref 3.3–5)
ALP SERPL-CCNC: 62 U/L — SIGNIFICANT CHANGE UP (ref 40–120)
ALT FLD-CCNC: 21 U/L — SIGNIFICANT CHANGE UP (ref 12–78)
ANION GAP SERPL CALC-SCNC: 10 MMOL/L — SIGNIFICANT CHANGE UP (ref 5–17)
AST SERPL-CCNC: 25 U/L — SIGNIFICANT CHANGE UP (ref 15–37)
BILIRUB SERPL-MCNC: 0.6 MG/DL — SIGNIFICANT CHANGE UP (ref 0.2–1.2)
BUN SERPL-MCNC: 58 MG/DL — HIGH (ref 7–23)
CALCIUM SERPL-MCNC: 7.8 MG/DL — LOW (ref 8.5–10.1)
CHLORIDE SERPL-SCNC: 94 MMOL/L — LOW (ref 96–108)
CO2 SERPL-SCNC: 24 MMOL/L — SIGNIFICANT CHANGE UP (ref 22–31)
CREAT SERPL-MCNC: 5.89 MG/DL — HIGH (ref 0.5–1.3)
GLUCOSE SERPL-MCNC: 215 MG/DL — HIGH (ref 70–99)
HCT VFR BLD CALC: 25.5 % — LOW (ref 39–50)
HGB BLD-MCNC: 8.2 G/DL — LOW (ref 13–17)
MCHC RBC-ENTMCNC: 32.2 GM/DL — SIGNIFICANT CHANGE UP (ref 32–36)
MCHC RBC-ENTMCNC: 33.5 PG — SIGNIFICANT CHANGE UP (ref 27–34)
MCV RBC AUTO: 104.1 FL — HIGH (ref 80–100)
PLATELET # BLD AUTO: 117 K/UL — LOW (ref 150–400)
POTASSIUM SERPL-MCNC: 4.8 MMOL/L — SIGNIFICANT CHANGE UP (ref 3.5–5.3)
POTASSIUM SERPL-SCNC: 4.8 MMOL/L — SIGNIFICANT CHANGE UP (ref 3.5–5.3)
PROT SERPL-MCNC: 5.6 GM/DL — LOW (ref 6–8.3)
RBC # BLD: 2.45 M/UL — LOW (ref 4.2–5.8)
RBC # FLD: 18 % — HIGH (ref 10.3–14.5)
SODIUM SERPL-SCNC: 128 MMOL/L — LOW (ref 135–145)
WBC # BLD: 7.42 K/UL — SIGNIFICANT CHANGE UP (ref 3.8–10.5)
WBC # FLD AUTO: 7.42 K/UL — SIGNIFICANT CHANGE UP (ref 3.8–10.5)

## 2020-05-13 PROCEDURE — 99233 SBSQ HOSP IP/OBS HIGH 50: CPT

## 2020-05-13 RX ADMIN — PANTOPRAZOLE SODIUM 10 MG/HR: 20 TABLET, DELAYED RELEASE ORAL at 16:35

## 2020-05-13 RX ADMIN — Medication 2: at 16:35

## 2020-05-13 RX ADMIN — FINASTERIDE 5 MILLIGRAM(S): 5 TABLET, FILM COATED ORAL at 12:28

## 2020-05-13 RX ADMIN — Medication 30 MILLIGRAM(S): at 16:31

## 2020-05-13 RX ADMIN — Medication 1 TABLET(S): at 12:28

## 2020-05-13 RX ADMIN — Medication 1: at 07:01

## 2020-05-13 RX ADMIN — Medication 30 MILLIGRAM(S): at 05:47

## 2020-05-13 RX ADMIN — Medication 75 MICROGRAM(S): at 05:47

## 2020-05-13 RX ADMIN — Medication 2000 UNIT(S): at 12:28

## 2020-05-13 RX ADMIN — Medication 1: at 12:29

## 2020-05-13 RX ADMIN — PANTOPRAZOLE SODIUM 10 MG/HR: 20 TABLET, DELAYED RELEASE ORAL at 08:21

## 2020-05-13 RX ADMIN — TAMSULOSIN HYDROCHLORIDE 0.4 MILLIGRAM(S): 0.4 CAPSULE ORAL at 21:09

## 2020-05-13 NOTE — PROGRESS NOTE ADULT - ASSESSMENT
84 yo male with a PMH htn, hld, dm, hypothyroid, t cell lymphoma, a fib on plavix and eliquis, s/p TAVR, ESRD (dialysis m/w/f, last dialysis was friday), was sent in by his doctor for low hemoglobin. Pt states he noticed black stool, intermittent for 15 days and feeling weak. Denies f/c/sweating, cough, cp, sob, n/v/d.  Pt on HD 3 x a week on MWF  found to have low HGB and sent to ED  Dx with active GI bleed, guaiac + stool  CXR clear    a/p  Active GI bleed   will dialyze and give 3 u prbc x 3 hours treatment  since pt is actively bleeding, no BRBPR but + guaiac in rectum    5/11 MK   - ESRD tolerating HD MWF schedule    limited uf with HD   - HTN  dc hydralazine for now  and monitor trend of the bp  - Anemia with component of Acute blood loss    colon neg 1 yr ago, egd after 2 neg covid     start epogen 10 K sq     5/12  for HD in am  s/p EGD, AVM  no new events    5/13 SY  --ESRD : HD order and tx reviewed.  Tolerating tx well.  --GIB : post EGD : Post Gastric AVM clipping.  Transfuse additional PRBC today.  --From renal standpoint, ok for d/c.

## 2020-05-13 NOTE — PROGRESS NOTE ADULT - ASSESSMENT
84 yo male with Hx. 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 The patient states he noted blood in the stool intermittently for the past 2 weeks. He states he could not get a doctors appointment. He states he lives with her daughter who is administering his medications and he does not know what medicine he is taking.  Hg in the ED was 5.9 and he was started on blood transfusion 3 PRBC  via dialysis in the ED.   GI consult obtained from Dr. Diaz and Porotonix drip was started.  he had GI eval. 2 years ago.    GI bleed secondary to gastric AVM that was treated.  Patient at high risk of having other AVMs elsewhere.  He had been on anticoagulation previously secondary to atrial fibrillation.  He is also had a history of TIA.  In light of increased risk of cardioembolic disease, anticoagulation can be reconsidered with a higher risk of bleeding.  Would maintain patient on Protonix and consider lower dosage of anticoagulation if deemed appropriate.    He does have multiple comorbid disorders including end-stage renal disease, coronary artery disease, history of diastolic CHF in the past as well as pulmonary hypertension.    If tolerates anticoagulation, aspirin can be resumed thereafter.  Close follow-up of his hematocrit would be required.    He had a colonoscopy last year however, results of this are not available to us.  We have requested this but have not received it.  Per daughter, this colonoscopy was negative.    Discussed with hospitalist, they will arrange for follow-up either with me or with patient's personal gastroenterologist.    discussed with  daughter Brigitte         Hx of skin ca but not melanoma per daughter  -miralax for constipation  -d/w RN

## 2020-05-13 NOTE — PROGRESS NOTE ADULT - SUBJECTIVE AND OBJECTIVE BOX
HPI: The patient is an 84 yo male with Hx. 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 The patient states he noted blood in the stool intermittently for the past 2 weeks. He states he could not get a doctors appointment. He states he lives with her daughter who is administering his medications and he does not know what medicine he is taking.  Hg in the ED was 5.9 and he was started on blood transfusion 3 PRBC  via dialysis in the ED.   GI consult obtained from Dr. Diaz and Porotonix drip was started.  he had GI eval. 2 years ago.  PMHx: : ESRD on dialysis m/w/f, HTN, DM2, Hypothyroid, cutaneous T Cell lymphoma, AS, SANG, SSCA , TIA, Gout, CHF, CAD, BPH , AS, A. fib  PSHx: TAVR,, hernia repair.     5/10 - feels better after 3U PRBCs; no cp palps sob or lightheadedness  5/11- at HD< no cp palps sob   5/12 - s/p EGD, noted to have AVMs, pt reports he had a dark bowel movement after coming back - no lightheadedness or cp   5/13 denies further bleeding. No dizziness. No lightheadedness    Vital Signs Last 24 Hrs  T(C): 36.8 (13 May 2020 11:45), Max: 37.7 (13 May 2020 10:20)  T(F): 98.2 (13 May 2020 11:45), Max: 99.8 (13 May 2020 10:20)  HR: 78 (13 May 2020 11:45) (66 - 96)  BP: 96/48 (13 May 2020 11:45) (93/45 - 115/57)  BP(mean): --  RR: 18 (13 May 2020 11:45) (16 - 18)  SpO2: 100% (13 May 2020 11:45) (99% - 100%)    PHYSICAL EXAM:  GENERAL: NAD, undergoing HD   EYES: EOMI, PERRLA, normal sclera  ENT: Moist mucous membranes  NECK: Supple, No JVD, no nuchal rigidity  CHEST/LUNG: Clear to auscultation bilaterally; RT CHEST WALL CATHETER   HEART: Regular rate and rhythm; No murmurs, rubs, or gallops  ABDOMEN: Bowel sounds present; Soft, Nontender, Nondistended. No hepatomegaly  EXTREMITIES:  no pitting bilaterally  NERVOUS SYSTEM:  Alert & Oriented X3, speech clear. No focal motor or sensory deficits  MSK: FROM all 4 extremities, full and equal strength      LABS:                         8.2    7.42  )-----------( 117      ( 13 May 2020 08:00 )             25.5     13 May 2020 08:00    128    |  94     |  58     ----------------------------<  215    4.8     |  24     |  5.89     Ca    7.8        13 May 2020 08:00  Mg     2.1       12 May 2020 07:04    TPro  5.6    /  Alb  2.3    /  TBili  0.6    /  DBili  x      /  AST  25     /  ALT  21     /  AlkPhos  62     13 May 2020 08:00    LIVER FUNCTIONS - ( 13 May 2020 08:00 )  Alb: 2.3 g/dL / Pro: 5.6 gm/dL / ALK PHOS: 62 U/L / ALT: 21 U/L / AST: 25 U/L / GGT: x           CAPILLARY BLOOD GLUCOSE  POCT Blood Glucose.: 169 mg/dL (13 May 2020 12:01)  POCT Blood Glucose.: 177 mg/dL (13 May 2020 06:55)  POCT Blood Glucose.: 160 mg/dL (12 May 2020 21:58)  POCT Blood Glucose.: 126 mg/dL (12 May 2020 17:31)    MEDICATIONS  (STANDING):  carvedilol 25 milliGRAM(s) Oral every 12 hours  cholecalciferol 2000 Unit(s) Oral daily  dextrose 5%. 1000 milliLiter(s) (50 mL/Hr) IV Continuous <Continuous>  dextrose 50% Injectable 12.5 Gram(s) IV Push once  dextrose 50% Injectable 25 Gram(s) IV Push once  dextrose 50% Injectable 25 Gram(s) IV Push once  diltiazem    Tablet 30 milliGRAM(s) Oral three times a day  epoetin-shakila-epbx (RETACRIT) Injectable 93601 Unit(s) SubCutaneous every 7 days  finasteride 5 milliGRAM(s) Oral daily  insulin lispro (HumaLOG) corrective regimen sliding scale   SubCutaneous three times a day before meals  levothyroxine 75 MICROGram(s) Oral daily  multivitamin 1 Tablet(s) Oral daily  pantoprazole Infusion 8 mG/Hr (10 mL/Hr) IV Continuous <Continuous>  polyethylene glycol 3350 17 Gram(s) Oral daily  tamsulosin 0.4 milliGRAM(s) Oral at bedtime    MEDICATIONS  (PRN):  dextrose 40% Gel 15 Gram(s) Oral once PRN Blood Glucose LESS THAN 70 milliGRAM(s)/deciliter  glucagon  Injectable 1 milliGRAM(s) IntraMuscular once PRN Glucose LESS THAN 70 milligrams/deciliter  polyethylene glycol 3350 17 Gram(s) Oral daily PRN Constipation

## 2020-05-13 NOTE — PROGRESS NOTE ADULT - SUBJECTIVE AND OBJECTIVE BOX
Medicine Progress Note    Patient is a 85y old  Male who presents with a chief complaint of GI bleed (13 May 2020 08:46)    The patient is an 84 yo male with Hx. 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 The patient states he noted blood in the stool intermittently for the past 2 weeks. He states he could not get a doctors appointment. He states he lives with her daughter who is administering his medications and he does not know what medicine he is taking.  Hg in the ED was 5.9 and he was started on blood transfusion 3 PRBC  via dialysis in the ED.     Pt states he has noted black stools on and off for the past month or more. Denies abdominal pain but does c/o hard stools and rectal pain with stool passage. He had a colonoscopy within the past 1-2 years ago per daughter however pt states last colonoscopy was about 4 y ago. No hematochezia. No dysphagia, no nausea. May takes NSAIDs also.  Given 3 units PRBC and has improved with hgb now 9.2 (portia 5.9).    Patient comfortable.  No further bleeding.  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  This was treated with argon plasma coagulation as well as clipping.  Esophageal stricture dilated.  Biopsies were performed as well.  Biopsies of the duodenum as well as the stomach.    Patient seen on dialysis.      SUBJECTIVE / OVERNIGHT EVENTS:NC    ADDITIONAL REVIEW OF SYSTEMS:NC    MEDICATIONS  (STANDING):  carvedilol 25 milliGRAM(s) Oral every 12 hours  cholecalciferol 2000 Unit(s) Oral daily  dextrose 5%. 1000 milliLiter(s) (50 mL/Hr) IV Continuous <Continuous>  dextrose 50% Injectable 12.5 Gram(s) IV Push once  dextrose 50% Injectable 25 Gram(s) IV Push once  dextrose 50% Injectable 25 Gram(s) IV Push once  diltiazem    Tablet 30 milliGRAM(s) Oral three times a day  epoetin-shakila-epbx (RETACRIT) Injectable 61455 Unit(s) SubCutaneous every 7 days  finasteride 5 milliGRAM(s) Oral daily  insulin lispro (HumaLOG) corrective regimen sliding scale   SubCutaneous three times a day before meals  levothyroxine 75 MICROGram(s) Oral daily  multivitamin 1 Tablet(s) Oral daily  pantoprazole Infusion 8 mG/Hr (10 mL/Hr) IV Continuous <Continuous>  polyethylene glycol 3350 17 Gram(s) Oral daily  tamsulosin 0.4 milliGRAM(s) Oral at bedtime    MEDICATIONS  (PRN):  dextrose 40% Gel 15 Gram(s) Oral once PRN Blood Glucose LESS THAN 70 milliGRAM(s)/deciliter  glucagon  Injectable 1 milliGRAM(s) IntraMuscular once PRN Glucose LESS THAN 70 milligrams/deciliter  polyethylene glycol 3350 17 Gram(s) Oral daily PRN Constipation    CAPILLARY BLOOD GLUCOSE      POCT Blood Glucose.: 177 mg/dL (13 May 2020 06:55)  POCT Blood Glucose.: 160 mg/dL (12 May 2020 21:58)  POCT Blood Glucose.: 126 mg/dL (12 May 2020 17:31)  POCT Blood Glucose.: 201 mg/dL (12 May 2020 12:43)    I&O's Summary    12 May 2020 07:01  -  13 May 2020 07:00  --------------------------------------------------------  IN: 400 mL / OUT: 0 mL / NET: 400 mL    13 May 2020 07:01  -  13 May 2020 11:12  --------------------------------------------------------  IN: 92 mL / OUT: 0 mL / NET: 92 mL        PHYSICAL EXAM:  Vital Signs Last 24 Hrs  T(C): 37.7 (13 May 2020 11:02), Max: 37.7 (13 May 2020 10:20)  T(F): 99.8 (13 May 2020 11:02), Max: 99.8 (13 May 2020 10:20)  HR: 77 (13 May 2020 11:02) (66 - 96)  BP: 113/56 (13 May 2020 11:02) (93/45 - 115/57)  BP(mean): --  RR: 16 (13 May 2020 11:02) (16 - 18)  SpO2: 100% (13 May 2020 04:15) (99% - 100%)  CONSTITUTIONAL: NAD, well-developed, well-groomed  ENMT: Moist oral mucosa, no pharyngeal injection or exudates; normal dentition  RESPIRATORY: Normal respiratory effort; lungs are clear to auscultation bilaterally  CARDIOVASCULAR: Regular rate and rhythm, normal S1 and S2, no murmur/rub/gallop; No lower extremity edema; Peripheral pulses are 2+ bilaterally  ABDOMEN: Nontender to palpation, normoactive bowel sounds, no rebound/guarding; No hepatosplenomegaly  PSYCH: A+O to person, place, and time; affect appropriate  NEUROLOGY: CN 2-12 are intact and symmetric; no gross sensory deficits   SKIN: No rashes; no palpable lesions    LABS:                        8.2    7.42  )-----------( 117      ( 13 May 2020 08:00 )             25.5     05-13    128<L>  |  94<L>  |  58<H>  ----------------------------<  215<H>  4.8   |  24  |  5.89<H>    Ca    7.8<L>      13 May 2020 08:00  Mg     2.1     05-12    TPro  5.6<L>  /  Alb  2.3<L>  /  TBili  0.6  /  DBili  x   /  AST  25  /  ALT  21  /  AlkPhos  62  05-13              COVID-19 PCR: NotDetec (11 May 2020 10:00)      RADIOLOGY & ADDITIONAL TESTS:      Electrocardiogram/QTc Interval:    COORDINATION OF CARE:  Care Discussed with Consultants/Other Providers:

## 2020-05-13 NOTE — PROGRESS NOTE ADULT - SUBJECTIVE AND OBJECTIVE BOX
NEPHROLOGY INTERVAL HPI/OVERNIGHT EVENTS:    Date of Service: 05-13-20 @ 08:47  5/13 SY  Feeling fair. No new complaints.  Denies SOB.  Post EGD.  Gastric AVM clipping done.    5/12  s/p EGD  "gastric incisural AVM SP argon plasma and clipping with ablation of AVM"  as per GI note    5/11 MK   seen at HD, no c/o for egd in am after 2 neg covid test   no abd pain     84 yo male with a PMH htn, hld, dm, hypothyroid, t cell lymphoma, a fib on plavix and eliquis, s/p TAVR, ESRD (dialysis m/w/f, last dialysis was friday), was sent in by his doctor for low hemoglobin. Pt states he noticed black stool, intermittent for 15 days and feeling weak. Denies f/c/sweating, cough, cp, sob, n/v/d.  Pt on HD 3 x a week on MWF  found to have low HGB and sent to ED  Dx with active GI bleed, guaiac + stool  CXR clear  h/o transfusion many years ago  recent GI w/u 2 yrs ago negative as per pt    MEDICATIONS  (STANDING):  carvedilol 25 milliGRAM(s) Oral every 12 hours  cholecalciferol 2000 Unit(s) Oral daily  dextrose 5%. 1000 milliLiter(s) (50 mL/Hr) IV Continuous <Continuous>  dextrose 50% Injectable 12.5 Gram(s) IV Push once  dextrose 50% Injectable 25 Gram(s) IV Push once  dextrose 50% Injectable 25 Gram(s) IV Push once  diltiazem    Tablet 30 milliGRAM(s) Oral three times a day  epoetin-shakila-epbx (RETACRIT) Injectable 22611 Unit(s) SubCutaneous every 7 days  finasteride 5 milliGRAM(s) Oral daily  insulin lispro (HumaLOG) corrective regimen sliding scale   SubCutaneous three times a day before meals  levothyroxine 75 MICROGram(s) Oral daily  multivitamin 1 Tablet(s) Oral daily  pantoprazole Infusion 8 mG/Hr (10 mL/Hr) IV Continuous <Continuous>  polyethylene glycol 3350 17 Gram(s) Oral daily  tamsulosin 0.4 milliGRAM(s) Oral at bedtime    MEDICATIONS  (PRN):  dextrose 40% Gel 15 Gram(s) Oral once PRN Blood Glucose LESS THAN 70 milliGRAM(s)/deciliter  glucagon  Injectable 1 milliGRAM(s) IntraMuscular once PRN Glucose LESS THAN 70 milligrams/deciliter  polyethylene glycol 3350 17 Gram(s) Oral daily PRN Constipation    Vital Signs Last 24 Hrs  T(C): 37.1 (13 May 2020 08:09), Max: 37.2 (12 May 2020 09:45)  T(F): 98.8 (13 May 2020 08:09), Max: 98.9 (12 May 2020 09:45)  HR: 66 (13 May 2020 08:09) (58 - 96)  BP: 96/47 (13 May 2020 08:09) (96/47 - 115/57)  BP(mean): --  RR: 18 (13 May 2020 08:09) (14 - 18)  SpO2: 100% (13 May 2020 04:15) (98% - 100%)    05-12 @ 07:01  -  05-13 @ 07:00  --------------------------------------------------------  IN: 400 mL / OUT: 0 mL / NET: 400 mL    05-13 @ 07:01  -  05-13 @ 08:47  --------------------------------------------------------  IN: 92 mL / OUT: 0 mL / NET: 92 mL    PHYSICAL EXAM:  Alert and comfortable.  GENERAL: No distress  CHEST/LUNG: Clear to aus  HEART: S1S2 RRR  ABDOMEN: soft  EXTREMITIES: trace edema  SKIN:     LABS:                        8.2    7.42  )-----------( 117      ( 13 May 2020 08:00 )             25.5     05-12    142  |  106  |  36<H>  ----------------------------<  122<H>  4.3   |  26  |  4.42<H>    Ca    8.7      12 May 2020 07:04  Mg     2.1     05-12                  RADIOLOGY & ADDITIONAL TESTS:

## 2020-05-13 NOTE — PROGRESS NOTE ADULT - ASSESSMENT
84 yo male with Hx. 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 The patient states he noted blood in the stool intermittently for the past 2 weeks. He states he could not get a doctors appointment. He states he lives with her daughter who is administering his medications and he does not know what medicine he is taking.  Hg in the ED was 5.9 and he was started on blood transfusion 3 PRBC  via dialysis in the ED.   GI consult obtained from Dr. Diaz and Porotonix drip was started.  he had GI eval. 2 years ago.    A/P  Acute symptomatic anemia 2/2 GI bleed 2/2 NOAC  AFIB on anticoagulation with ELiquis at home; h/o TIA  DM HTN   ESRD on HD  Hypothyroidism  h/o cutaneous T Cell lymphoma  Gout  CAD, h/o CHF (diastolic based on prev ECHO), Pulm HTN   BPH      Plan:      STOP ELIQUIS,  s/p 3 units PRBC in the ED via dialysis,catheter- 1 more Unit today 5/13  On Protonix drip  s/p EGD 5/12 - AVMs noted, discussed with DR Fonseca - discussed with daughter regarding risks benefits of anticoagulation - she understands the risk of stroke if AC is held and the risk of massive bleed on AC. SHe would prefer to go ahead with AC - explained we would start low dose eliquis if H&H is stable tomorrow and consider adding ASA after that if he continues to be stable.  avoid NSAIDS antiplatelets anticoagulants   cont CCB BB for rate control  cont hydralazine  cont finasteride and tamsulosin  cont synthroid  cont HD per schedule, pt seen by Dr Alvarado   for VTE px - SCDs only, OOB to chair     #DVT proph- if HH remains stable tomorrow, consider restarting low dose of Eliquis and monitor    #Dispo- PT eval. Patient wants to go home eventually when clinically stable

## 2020-05-14 LAB
ANION GAP SERPL CALC-SCNC: 8 MMOL/L — SIGNIFICANT CHANGE UP (ref 5–17)
BASOPHILS # BLD AUTO: 0.01 K/UL — SIGNIFICANT CHANGE UP (ref 0–0.2)
BASOPHILS NFR BLD AUTO: 0.2 % — SIGNIFICANT CHANGE UP (ref 0–2)
BUN SERPL-MCNC: 47 MG/DL — HIGH (ref 7–23)
CALCIUM SERPL-MCNC: 8.5 MG/DL — SIGNIFICANT CHANGE UP (ref 8.5–10.1)
CHLORIDE SERPL-SCNC: 99 MMOL/L — SIGNIFICANT CHANGE UP (ref 96–108)
CO2 SERPL-SCNC: 27 MMOL/L — SIGNIFICANT CHANGE UP (ref 22–31)
CREAT SERPL-MCNC: 4.75 MG/DL — HIGH (ref 0.5–1.3)
EOSINOPHIL # BLD AUTO: 0.13 K/UL — SIGNIFICANT CHANGE UP (ref 0–0.5)
EOSINOPHIL NFR BLD AUTO: 2.1 % — SIGNIFICANT CHANGE UP (ref 0–6)
GLUCOSE SERPL-MCNC: 164 MG/DL — HIGH (ref 70–99)
HCT VFR BLD CALC: 29.4 % — LOW (ref 39–50)
HGB BLD-MCNC: 9.4 G/DL — LOW (ref 13–17)
IMM GRANULOCYTES NFR BLD AUTO: 0.7 % — SIGNIFICANT CHANGE UP (ref 0–1.5)
LYMPHOCYTES # BLD AUTO: 0.71 K/UL — LOW (ref 1–3.3)
LYMPHOCYTES # BLD AUTO: 11.6 % — LOW (ref 13–44)
MAGNESIUM SERPL-MCNC: 2.1 MG/DL — SIGNIFICANT CHANGE UP (ref 1.6–2.6)
MCHC RBC-ENTMCNC: 32 GM/DL — SIGNIFICANT CHANGE UP (ref 32–36)
MCHC RBC-ENTMCNC: 32.8 PG — SIGNIFICANT CHANGE UP (ref 27–34)
MCV RBC AUTO: 102.4 FL — HIGH (ref 80–100)
MONOCYTES # BLD AUTO: 0.69 K/UL — SIGNIFICANT CHANGE UP (ref 0–0.9)
MONOCYTES NFR BLD AUTO: 11.3 % — SIGNIFICANT CHANGE UP (ref 2–14)
NEUTROPHILS # BLD AUTO: 4.52 K/UL — SIGNIFICANT CHANGE UP (ref 1.8–7.4)
NEUTROPHILS NFR BLD AUTO: 74.1 % — SIGNIFICANT CHANGE UP (ref 43–77)
PHOSPHATE SERPL-MCNC: 3.9 MG/DL — SIGNIFICANT CHANGE UP (ref 2.5–4.5)
PLATELET # BLD AUTO: 112 K/UL — LOW (ref 150–400)
POTASSIUM SERPL-MCNC: 4.6 MMOL/L — SIGNIFICANT CHANGE UP (ref 3.5–5.3)
POTASSIUM SERPL-SCNC: 4.6 MMOL/L — SIGNIFICANT CHANGE UP (ref 3.5–5.3)
RBC # BLD: 2.87 M/UL — LOW (ref 4.2–5.8)
RBC # FLD: 18.3 % — HIGH (ref 10.3–14.5)
SODIUM SERPL-SCNC: 134 MMOL/L — LOW (ref 135–145)
WBC # BLD: 6.1 K/UL — SIGNIFICANT CHANGE UP (ref 3.8–10.5)
WBC # FLD AUTO: 6.1 K/UL — SIGNIFICANT CHANGE UP (ref 3.8–10.5)

## 2020-05-14 PROCEDURE — 99233 SBSQ HOSP IP/OBS HIGH 50: CPT

## 2020-05-14 RX ORDER — CARVEDILOL PHOSPHATE 80 MG/1
12.5 CAPSULE, EXTENDED RELEASE ORAL EVERY 12 HOURS
Refills: 0 | Status: DISCONTINUED | OUTPATIENT
Start: 2020-05-14 | End: 2020-05-15

## 2020-05-14 RX ORDER — APIXABAN 2.5 MG/1
2.5 TABLET, FILM COATED ORAL
Refills: 0 | Status: DISCONTINUED | OUTPATIENT
Start: 2020-05-14 | End: 2020-05-15

## 2020-05-14 RX ORDER — PANTOPRAZOLE SODIUM 20 MG/1
40 TABLET, DELAYED RELEASE ORAL
Refills: 0 | Status: DISCONTINUED | OUTPATIENT
Start: 2020-05-14 | End: 2020-05-15

## 2020-05-14 RX ORDER — ACETAMINOPHEN 500 MG
650 TABLET ORAL EVERY 6 HOURS
Refills: 0 | Status: DISCONTINUED | OUTPATIENT
Start: 2020-05-14 | End: 2020-05-15

## 2020-05-14 RX ADMIN — CARVEDILOL PHOSPHATE 25 MILLIGRAM(S): 80 CAPSULE, EXTENDED RELEASE ORAL at 06:10

## 2020-05-14 RX ADMIN — Medication 2000 UNIT(S): at 13:07

## 2020-05-14 RX ADMIN — Medication 650 MILLIGRAM(S): at 17:34

## 2020-05-14 RX ADMIN — TAMSULOSIN HYDROCHLORIDE 0.4 MILLIGRAM(S): 0.4 CAPSULE ORAL at 22:42

## 2020-05-14 RX ADMIN — Medication 30 MILLIGRAM(S): at 22:42

## 2020-05-14 RX ADMIN — POLYETHYLENE GLYCOL 3350 17 GRAM(S): 17 POWDER, FOR SOLUTION ORAL at 13:07

## 2020-05-14 RX ADMIN — Medication 75 MICROGRAM(S): at 06:10

## 2020-05-14 RX ADMIN — Medication 1 TABLET(S): at 13:07

## 2020-05-14 RX ADMIN — Medication 30 MILLIGRAM(S): at 13:46

## 2020-05-14 RX ADMIN — APIXABAN 2.5 MILLIGRAM(S): 2.5 TABLET, FILM COATED ORAL at 13:46

## 2020-05-14 RX ADMIN — FINASTERIDE 5 MILLIGRAM(S): 5 TABLET, FILM COATED ORAL at 13:07

## 2020-05-14 RX ADMIN — PANTOPRAZOLE SODIUM 40 MILLIGRAM(S): 20 TABLET, DELAYED RELEASE ORAL at 17:34

## 2020-05-14 RX ADMIN — PANTOPRAZOLE SODIUM 10 MG/HR: 20 TABLET, DELAYED RELEASE ORAL at 08:48

## 2020-05-14 RX ADMIN — Medication 650 MILLIGRAM(S): at 23:40

## 2020-05-14 RX ADMIN — Medication 30 MILLIGRAM(S): at 06:10

## 2020-05-14 RX ADMIN — APIXABAN 2.5 MILLIGRAM(S): 2.5 TABLET, FILM COATED ORAL at 17:34

## 2020-05-14 NOTE — PROGRESS NOTE ADULT - ASSESSMENT
86 yo male with a PMH htn, hld, dm, hypothyroid, t cell lymphoma, a fib on plavix and eliquis, s/p TAVR, ESRD (dialysis m/w/f, last dialysis was friday), was sent in by his doctor for low hemoglobin. Pt states he noticed black stool, intermittent for 15 days and feeling weak. Denies f/c/sweating, cough, cp, sob, n/v/d.  Pt on HD 3 x a week on MWF  found to have low HGB and sent to ED  Dx with active GI bleed, guaiac + stool  CXR clear    a/p  Active GI bleed   will dialyze and give 3 u prbc x 3 hours treatment  since pt is actively bleeding, no BRBPR but + guaiac in rectum    5/11 MK   - ESRD tolerating HD MWF schedule    limited uf with HD   - HTN  dc hydralazine for now  and monitor trend of the bp  - Anemia with component of Acute blood loss    colon neg 1 yr ago, egd after 2 neg covid     start epogen 10 K sq     5/12  for HD in am  s/p EGD, AVM  no new events    5/13 SY  --ESRD : HD order and tx reviewed.  Tolerating tx well.  --GIB : post EGD : Post Gastric AVM clipping.  Transfuse additional PRBC today.  --From renal standpoint, ok for d/c.    5/14  As above  stable for dc from renal standpoint

## 2020-05-14 NOTE — PROGRESS NOTE ADULT - SUBJECTIVE AND OBJECTIVE BOX
NEPHROLOGY INTERVAL HPI/OVERNIGHT EVENTS:        5/14  did well w PT  No rehab needed as per PT  No bloody stool   s/p HD yesterday      5/13 SY  Feeling fair. No new complaints.  Denies SOB.  Post EGD.  Gastric AVM clipping done.    5/12  s/p EGD  "gastric incisural AVM SP argon plasma and clipping with ablation of AVM"  as per GI note    5/11 MK   seen at HD, no c/o for egd in am after 2 neg covid test   no abd pain     86 yo male with a PMH htn, hld, dm, hypothyroid, t cell lymphoma, a fib on plavix and eliquis, s/p TAVR, ESRD (dialysis m/w/f, last dialysis was friday), was sent in by his doctor for low hemoglobin. Pt states he noticed black stool, intermittent for 15 days and feeling weak. Denies f/c/sweating, cough, cp, sob, n/v/d.  Pt on HD 3 x a week on MWF  found to have low HGB and sent to ED  Dx with active GI bleed, guaiac + stool  CXR clear  h/o transfusion many years ago  recent GI w/u 2 yrs ago negative as per pt    MEDICATIONS  (STANDING):  carvedilol 25 milliGRAM(s) Oral every 12 hours  cholecalciferol 2000 Unit(s) Oral daily  dextrose 5%. 1000 milliLiter(s) (50 mL/Hr) IV Continuous <Continuous>  dextrose 50% Injectable 12.5 Gram(s) IV Push once  dextrose 50% Injectable 25 Gram(s) IV Push once  dextrose 50% Injectable 25 Gram(s) IV Push once  diltiazem    Tablet 30 milliGRAM(s) Oral three times a day  epoetin-shakila-epbx (RETACRIT) Injectable 88698 Unit(s) SubCutaneous every 7 days  finasteride 5 milliGRAM(s) Oral daily  insulin lispro (HumaLOG) corrective regimen sliding scale   SubCutaneous three times a day before meals  levothyroxine 75 MICROGram(s) Oral daily  multivitamin 1 Tablet(s) Oral daily  pantoprazole Infusion 8 mG/Hr (10 mL/Hr) IV Continuous <Continuous>  polyethylene glycol 3350 17 Gram(s) Oral daily  tamsulosin 0.4 milliGRAM(s) Oral at bedtime    MEDICATIONS  (PRN):  dextrose 40% Gel 15 Gram(s) Oral once PRN Blood Glucose LESS THAN 70 milliGRAM(s)/deciliter  glucagon  Injectable 1 milliGRAM(s) IntraMuscular once PRN Glucose LESS THAN 70 milligrams/deciliter  polyethylene glycol 3350 17 Gram(s) Oral daily PRN Constipation      I&O's Detail    13 May 2020 07:01  -  14 May 2020 07:00  --------------------------------------------------------  IN:    pantoprazole Infusion: 172 mL  Total IN: 172 mL    OUT:  Total OUT: 0 mL    Total NET: 172 mL    Vital Signs Last 24 Hrs  T(C): 36.4 (14 May 2020 05:04), Max: 37.7 (13 May 2020 10:20)  T(F): 97.5 (14 May 2020 05:04), Max: 99.8 (13 May 2020 10:20)  HR: 85 (14 May 2020 05:04) (68 - 85)  BP: 120/52 (14 May 2020 05:04) (94/38 - 120/52)  BP(mean): --  RR: 17 (14 May 2020 05:04) (16 - 18)  SpO2: 100% (14 May 2020 05:04) (99% - 100%)      PHYSICAL EXAM:  Alert and comfortable.  GENERAL: No distress  CHEST/LUNG: Clear to aus  HEART: S1S2 RRR  ABDOMEN: soft  EXTREMITIES: trace edema  SKIN:     LABS:                                   9.4    6.10  )-----------( 112      ( 14 May 2020 09:08 )             29.4                         8.2    7.42  )-----------( 117      ( 13 May 2020 08:00 )             25.5                         8.5    x     )-----------( x        ( 12 May 2020 07:04 )             26.2       05-14    134<L>  |  99  |  47<H>  ----------------------------<  164<H>  4.6   |  27  |  4.75<H>    Ca    8.5      14 May 2020 09:08  Phos  3.9     05-14  Mg     2.1     05-14    TPro  5.6<L>  /  Alb  2.3<L>  /  TBili  0.6  /  DBili  x   /  AST  25  /  ALT  21  /  AlkPhos  62  05-13

## 2020-05-14 NOTE — PROVIDER CONTACT NOTE (OTHER) - SITUATION
DR. NEAL SEYMOUR, SPOKE WITH THEA FROM MD'S OFFICE. SHE WILL INFORM, PCP PATIENT IS ADMITTED TO . PLEASE FAX DISCHARGE PROVIDER NOTE AND SUMMARY -[phone]
atrial fib. ,  Service aware - Alaina

## 2020-05-14 NOTE — PHYSICAL THERAPY INITIAL EVALUATION ADULT - CRITERIA FOR SKILLED THERAPEUTIC INTERVENTIONS
pt does not require further PT intervention @ this time; pt currently @ baseline functional status; recommend daily OOB / amb with RW as shawn on nursing floor care

## 2020-05-14 NOTE — PHYSICAL THERAPY INITIAL EVALUATION ADULT - MODALITIES TREATMENT COMMENTS
pt left in bed supine post Eval @ pt request; bed alarm on; IV intact; callbell in reach; pt instructed not to get up alone; call nursing for assist; shawn well; denied pain

## 2020-05-14 NOTE — PROGRESS NOTE ADULT - SUBJECTIVE AND OBJECTIVE BOX
Patient is a 85y old  Male who presents with a chief complaint of GI bleed (14 May 2020 10:06)      HPI:  HPI: The patient is an 84 yo male with Hx. 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 The patient states he noted blood in the stool intermittently for the past 2 weeks. He states he could not get a doctors appointment. He states he lives with her daughter who is administering his medications and he does not know what medicine he is taking.  Hg in the ED was 5.9 and he was started on blood transfusion 3 PRBC  via dialysis in the ED.         PMHx: : ESRD on dialysis m/w/f, HTN, DM2, Hypothyroid, cutaneous T Cell lymphoma, AS, SANG, SSCA , TIA, Gout, CHF, CAD, BPH , AS, A. fib  Pt states he has noted black stools on and off for the past month or more. Denies abdominal pain but does c/o hard stools and rectal pain with stool passage. He had a colonoscopy within the past 1-2 years ago per daughter however pt states last colonoscopy was about 4 y ago. No hematochezia. No dysphagia, no nausea. May takes NSAIDs also.  Given 3 units PRBC and has improved with hgb now 9.2 (portia 5.9).    Patient comfortable.  No further bleeding.  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  This was treated with argon plasma coagulation as well as clipping.  Esophageal stricture dilated.  Biopsies were performed as well.  Biopsies of the duodenum as well as the stomach.  PSHx: TAVAR,, hernia repair.     Family Hx: fater  of skin CA, mother  of MI,     Social Hx.: not smoking, no alcohol use    ROS:   Eyes: no changes in vision    ENT/Mouth: no changes    Cardiovascular: no chest pain    Respiratory: no SOB    Gastrointestinal: had blood in the stool,  no nausea, no vomiting    Genitourinary: no dysuria    Breast: no pain    Musculoskeletal: no pain    Integumentary: no itching    Neurological: No Headache, no tremor,    Psychiatric: no suicidal ideations    Endocrine: no excessive thirst,     Hematologic/Lymphatic: no swollen glands    Allergic/Immunologic: no itching          Lab:                       5.9    7.14  )-----------( 149      ( 09 May 2020 17:45 )             19.5       05-09    140  |  100  |  42<H>  ----------------------------<  131<H>  3.6   |  32<H>  |  5.20<H>    Ca    8.3<L>      09 May 2020 15:47    TPro  6.3  /  Alb  2.7<L>  /  TBili  0.4  /  DBili  x   /  AST  24  /  ALT  23  /  AlkPhos  79   (09 May 2020 19:00)      PAST MEDICAL & SURGICAL HISTORY:  Afib  TIA (transient ischemic attack)  CAD (coronary artery disease)  OAB (overactive bladder)  Gout  LBBB (left bundle branch block)  CHF (congestive heart failure)  Aortic valve stenosis  Squamous cell carcinoma of skin  Pleural effusion on left  SANG (obstructive sleep apnea)  ESRD on dialysis  BPH (benign prostatic hyperplasia)  Cutaneous T-cell lymphoma  Hypothyroid  Hypercholesteremia  DM (diabetes mellitus)  HTN (hypertension)  H/O eye surgery  S/P coronary artery stent placement  S/P TAVR (transcatheter aortic valve replacement)  H/O hernia repair      MEDICATIONS  (STANDING):  apixaban 2.5 milliGRAM(s) Oral two times a day  carvedilol 12.5 milliGRAM(s) Oral every 12 hours  cholecalciferol 2000 Unit(s) Oral daily  dextrose 5%. 1000 milliLiter(s) (50 mL/Hr) IV Continuous <Continuous>  dextrose 50% Injectable 12.5 Gram(s) IV Push once  dextrose 50% Injectable 25 Gram(s) IV Push once  dextrose 50% Injectable 25 Gram(s) IV Push once  diltiazem    Tablet 30 milliGRAM(s) Oral three times a day  epoetin-shakila-epbx (RETACRIT) Injectable 11010 Unit(s) SubCutaneous every 7 days  finasteride 5 milliGRAM(s) Oral daily  insulin lispro (HumaLOG) corrective regimen sliding scale   SubCutaneous three times a day before meals  levothyroxine 75 MICROGram(s) Oral daily  multivitamin 1 Tablet(s) Oral daily  pantoprazole  Injectable 40 milliGRAM(s) IV Push two times a day  polyethylene glycol 3350 17 Gram(s) Oral daily  tamsulosin 0.4 milliGRAM(s) Oral at bedtime    MEDICATIONS  (PRN):  dextrose 40% Gel 15 Gram(s) Oral once PRN Blood Glucose LESS THAN 70 milliGRAM(s)/deciliter  glucagon  Injectable 1 milliGRAM(s) IntraMuscular once PRN Glucose LESS THAN 70 milligrams/deciliter  polyethylene glycol 3350 17 Gram(s) Oral daily PRN Constipation      Allergies    No Known Allergies    Intolerances        SOCIAL HISTORY:NC    FAMILY HISTORY:  No pertinent family history in first degree relatives      REVIEW OF SYSTEMS:    CONSTITUTIONAL: No weakness, fevers or chills  EYES/ENT: No visual changes;  No vertigo or throat pain   NECK: No pain or stiffness  RESPIRATORY: No cough, wheezing, hemoptysis; No shortness of breath  CARDIOVASCULAR: No chest pain or palpitations  GENITOURINARY: No dysuria, frequency or hematuria  NEUROLOGICAL: No numbness or weakness  SKIN: No itching, burning, rashes, or lesions   All other review of systems is negative unless indicated above.    Vital Signs Last 24 Hrs  T(C): 36.7 (14 May 2020 11:04), Max: 37.1 (13 May 2020 16:36)  T(F): 98 (14 May 2020 11:04), Max: 98.8 (13 May 2020 16:36)  HR: 61 (14 May 2020 13:47) (60 - 85)  BP: 103/49 (14 May 2020 13:47) (94/38 - 120/52)  BP(mean): --  RR: 17 (14 May 2020 11:04) (16 - 17)  SpO2: 100% (14 May 2020 11:04) (99% - 100%)    PHYSICAL EXAM:    Constitutional: NAD, well-developed  HEENT: EOMI, throat clear  Neck: No LAD, supple  Respiratory: CTA and P  Cardiovascular: S1 and S2, RRR, no M  Gastrointestinal: BS+, soft, NT/ND, neg HSM,  Extremities: No peripheral edema, neg clubing, cyanosis  Vascular: 2+ peripheral pulses  Neurological: A/O x 3, no focal deficits  Psychiatric: Normal mood, normal affect  Skin: No rashes    LABS:  CBC Full  -  ( 14 May 2020 09:08 )  WBC Count : 6.10 K/uL  RBC Count : 2.87 M/uL  Hemoglobin : 9.4 g/dL  Hematocrit : 29.4 %  Platelet Count - Automated : 112 K/uL  Mean Cell Volume : 102.4 fl  Mean Cell Hemoglobin : 32.8 pg  Mean Cell Hemoglobin Concentration : 32.0 gm/dL  Auto Neutrophil # : 4.52 K/uL  Auto Lymphocyte # : 0.71 K/uL  Auto Monocyte # : 0.69 K/uL  Auto Eosinophil # : 0.13 K/uL  Auto Basophil # : 0.01 K/uL  Auto Neutrophil % : 74.1 %  Auto Lymphocyte % : 11.6 %  Auto Monocyte % : 11.3 %  Auto Eosinophil % : 2.1 %  Auto Basophil % : 0.2 %        134<L>  |  99  |  47<H>  ----------------------------<  164<H>  4.6   |  27  |  4.75<H>    Ca    8.5      14 May 2020 09:08  Phos  3.9       Mg     2.1         TPro  5.6<L>  /  Alb  2.3<L>  /  TBili  0.6  /  DBili  x   /  AST  25  /  ALT  21  /  AlkPhos  62  05-13        05-09 @ 15:47  Hep A Igm Nonreact  Hep A total ab, IgA and M --  Hep B core Ab total --  Hep B core IgM Nonreact  Hep B DNA PCR --  Hep BSAg --  Hep BSAb --  Hep BSAb --  HCV Ab --  HCV RNA Log --  HCV RNA interp --                RADIOLOGY & ADDITIONAL STUDIES:

## 2020-05-14 NOTE — PHYSICAL THERAPY INITIAL EVALUATION ADULT - PERTINENT HX OF CURRENT PROBLEM, REHAB EVAL
intermittent bloody stool x 2 weeks; low Hgb; sent to  by MD; EGD / ablation / clipping of AVM performed 5/12

## 2020-05-14 NOTE — PROGRESS NOTE ADULT - ASSESSMENT
86 yo male with Hx. 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 The patient states he noted blood in the stool intermittently for the past 2 weeks. He states he could not get a doctors appointment. He states he lives with her daughter who is administering his medications and he does not know what medicine he is taking.  Hg in the ED was 5.9 and he was started on blood transfusion 3 PRBC  via dialysis in the ED.   GI consult obtained from Dr. Diaz and Porotonix drip was started.  he had GI eval. 2 years ago.    GI bleed secondary to gastric AVM that was treated.  Patient at high risk of having other AVMs elsewhere.  He had been on anticoagulation previously secondary to atrial fibrillation.  He is also had a history of TIA.  In light of increased risk of cardioembolic disease, anticoagulation can be reconsidered with a higher risk of bleeding.  Would maintain patient on Protonix and consider lower dosage of anticoagulation if deemed appropriate.    He does have multiple comorbid disorders including end-stage renal disease, coronary artery disease, history of diastolic CHF in the past as well as pulmonary hypertension.    If tolerates anticoagulation, aspirin can be resumed thereafter.  Close follow-up of his hematocrit would be required.    He had a colonoscopy last year however, results of this are not available to us.  We have requested this but have not received it.  Per daughter, this colonoscopy was negative.    Discussed with hospitalist, they will arrange for follow-up either with me or with patient's personal gastroenterologist.    discussed with  daughter Brigitte  previously        Hx of skin ca but not melanoma per daughter

## 2020-05-15 ENCOUNTER — TRANSCRIPTION ENCOUNTER (OUTPATIENT)
Age: 85
End: 2020-05-15

## 2020-05-15 VITALS
OXYGEN SATURATION: 100 % | RESPIRATION RATE: 16 BRPM | HEART RATE: 74 BPM | DIASTOLIC BLOOD PRESSURE: 49 MMHG | SYSTOLIC BLOOD PRESSURE: 114 MMHG

## 2020-05-15 LAB
ALBUMIN SERPL ELPH-MCNC: 2.4 G/DL — LOW (ref 3.3–5)
ANION GAP SERPL CALC-SCNC: 10 MMOL/L — SIGNIFICANT CHANGE UP (ref 5–17)
BASOPHILS # BLD AUTO: 0.01 K/UL — SIGNIFICANT CHANGE UP (ref 0–0.2)
BASOPHILS NFR BLD AUTO: 0.2 % — SIGNIFICANT CHANGE UP (ref 0–2)
BUN SERPL-MCNC: 75 MG/DL — HIGH (ref 7–23)
CALCIUM SERPL-MCNC: 8.4 MG/DL — LOW (ref 8.5–10.1)
CHLORIDE SERPL-SCNC: 98 MMOL/L — SIGNIFICANT CHANGE UP (ref 96–108)
CO2 SERPL-SCNC: 26 MMOL/L — SIGNIFICANT CHANGE UP (ref 22–31)
CREAT SERPL-MCNC: 6.24 MG/DL — HIGH (ref 0.5–1.3)
EOSINOPHIL # BLD AUTO: 0.16 K/UL — SIGNIFICANT CHANGE UP (ref 0–0.5)
EOSINOPHIL NFR BLD AUTO: 2.6 % — SIGNIFICANT CHANGE UP (ref 0–6)
GLUCOSE SERPL-MCNC: 179 MG/DL — HIGH (ref 70–99)
HCT VFR BLD CALC: 26.2 % — LOW (ref 39–50)
HCT VFR BLD CALC: 28.2 % — LOW (ref 39–50)
HGB BLD-MCNC: 8.7 G/DL — LOW (ref 13–17)
HGB BLD-MCNC: 9.2 G/DL — LOW (ref 13–17)
IMM GRANULOCYTES NFR BLD AUTO: 0.7 % — SIGNIFICANT CHANGE UP (ref 0–1.5)
LYMPHOCYTES # BLD AUTO: 0.73 K/UL — LOW (ref 1–3.3)
LYMPHOCYTES # BLD AUTO: 12 % — LOW (ref 13–44)
MCHC RBC-ENTMCNC: 32.6 GM/DL — SIGNIFICANT CHANGE UP (ref 32–36)
MCHC RBC-ENTMCNC: 33 PG — SIGNIFICANT CHANGE UP (ref 27–34)
MCHC RBC-ENTMCNC: 33.2 GM/DL — SIGNIFICANT CHANGE UP (ref 32–36)
MCHC RBC-ENTMCNC: 34 PG — SIGNIFICANT CHANGE UP (ref 27–34)
MCV RBC AUTO: 101.1 FL — HIGH (ref 80–100)
MCV RBC AUTO: 102.3 FL — HIGH (ref 80–100)
MONOCYTES # BLD AUTO: 0.59 K/UL — SIGNIFICANT CHANGE UP (ref 0–0.9)
MONOCYTES NFR BLD AUTO: 9.7 % — SIGNIFICANT CHANGE UP (ref 2–14)
NEUTROPHILS # BLD AUTO: 4.56 K/UL — SIGNIFICANT CHANGE UP (ref 1.8–7.4)
NEUTROPHILS NFR BLD AUTO: 74.8 % — SIGNIFICANT CHANGE UP (ref 43–77)
PHOSPHATE SERPL-MCNC: 4.4 MG/DL — SIGNIFICANT CHANGE UP (ref 2.5–4.5)
PLATELET # BLD AUTO: 114 K/UL — LOW (ref 150–400)
PLATELET # BLD AUTO: 122 K/UL — LOW (ref 150–400)
POTASSIUM SERPL-MCNC: 4.7 MMOL/L — SIGNIFICANT CHANGE UP (ref 3.5–5.3)
POTASSIUM SERPL-SCNC: 4.7 MMOL/L — SIGNIFICANT CHANGE UP (ref 3.5–5.3)
RBC # BLD: 2.56 M/UL — LOW (ref 4.2–5.8)
RBC # BLD: 2.79 M/UL — LOW (ref 4.2–5.8)
RBC # FLD: 17.2 % — HIGH (ref 10.3–14.5)
RBC # FLD: 17.5 % — HIGH (ref 10.3–14.5)
SODIUM SERPL-SCNC: 134 MMOL/L — LOW (ref 135–145)
WBC # BLD: 5.75 K/UL — SIGNIFICANT CHANGE UP (ref 3.8–10.5)
WBC # BLD: 6.09 K/UL — SIGNIFICANT CHANGE UP (ref 3.8–10.5)
WBC # FLD AUTO: 5.75 K/UL — SIGNIFICANT CHANGE UP (ref 3.8–10.5)
WBC # FLD AUTO: 6.09 K/UL — SIGNIFICANT CHANGE UP (ref 3.8–10.5)

## 2020-05-15 PROCEDURE — 99239 HOSP IP/OBS DSCHRG MGMT >30: CPT

## 2020-05-15 RX ORDER — APIXABAN 2.5 MG/1
1 TABLET, FILM COATED ORAL
Qty: 0 | Refills: 0 | DISCHARGE
Start: 2020-05-15

## 2020-05-15 RX ORDER — ASPIRIN/CALCIUM CARB/MAGNESIUM 324 MG
1 TABLET ORAL
Qty: 30 | Refills: 0
Start: 2020-05-15 | End: 2020-06-13

## 2020-05-15 RX ORDER — PANTOPRAZOLE SODIUM 20 MG/1
1 TABLET, DELAYED RELEASE ORAL
Qty: 60 | Refills: 0
Start: 2020-05-15

## 2020-05-15 RX ADMIN — Medication 1 TABLET(S): at 13:16

## 2020-05-15 RX ADMIN — FINASTERIDE 5 MILLIGRAM(S): 5 TABLET, FILM COATED ORAL at 13:16

## 2020-05-15 RX ADMIN — Medication 30 MILLIGRAM(S): at 13:20

## 2020-05-15 RX ADMIN — Medication 30 MILLIGRAM(S): at 05:19

## 2020-05-15 RX ADMIN — APIXABAN 2.5 MILLIGRAM(S): 2.5 TABLET, FILM COATED ORAL at 05:19

## 2020-05-15 RX ADMIN — CARVEDILOL PHOSPHATE 12.5 MILLIGRAM(S): 80 CAPSULE, EXTENDED RELEASE ORAL at 05:19

## 2020-05-15 RX ADMIN — Medication 2000 UNIT(S): at 13:16

## 2020-05-15 RX ADMIN — Medication 75 MICROGRAM(S): at 05:19

## 2020-05-15 RX ADMIN — PANTOPRAZOLE SODIUM 40 MILLIGRAM(S): 20 TABLET, DELAYED RELEASE ORAL at 05:19

## 2020-05-15 NOTE — PROGRESS NOTE ADULT - SUBJECTIVE AND OBJECTIVE BOX
NEPHROLOGY INTERVAL HPI/OVERNIGHT EVENTS:       5/15  seen on HD   vvs   ok for dc from renal standpoint     5/14  did well w PT  No rehab needed as per PT  No bloody stool   s/p HD yesterday      5/13 SY  Feeling fair. No new complaints.  Denies SOB.  Post EGD.  Gastric AVM clipping done.    5/12  s/p EGD  "gastric incisural AVM SP argon plasma and clipping with ablation of AVM"  as per GI note    5/11 MK   seen at HD, no c/o for egd in am after 2 neg covid test   no abd pain     84 yo male with a PMH htn, hld, dm, hypothyroid, t cell lymphoma, a fib on plavix and eliquis, s/p TAVR, ESRD (dialysis m/w/f, last dialysis was friday), was sent in by his doctor for low hemoglobin. Pt states he noticed black stool, intermittent for 15 days and feeling weak. Denies f/c/sweating, cough, cp, sob, n/v/d.  Pt on HD 3 x a week on MWF  found to have low HGB and sent to ED  Dx with active GI bleed, guaiac + stool  CXR clear  h/o transfusion many years ago  recent GI w/u 2 yrs ago negative as per pt    MEDICATIONS  (STANDING):  carvedilol 25 milliGRAM(s) Oral every 12 hours  cholecalciferol 2000 Unit(s) Oral daily  dextrose 5%. 1000 milliLiter(s) (50 mL/Hr) IV Continuous <Continuous>  dextrose 50% Injectable 12.5 Gram(s) IV Push once  dextrose 50% Injectable 25 Gram(s) IV Push once  dextrose 50% Injectable 25 Gram(s) IV Push once  diltiazem    Tablet 30 milliGRAM(s) Oral three times a day  epoetin-shakila-epbx (RETACRIT) Injectable 81889 Unit(s) SubCutaneous every 7 days  finasteride 5 milliGRAM(s) Oral daily  insulin lispro (HumaLOG) corrective regimen sliding scale   SubCutaneous three times a day before meals  levothyroxine 75 MICROGram(s) Oral daily  multivitamin 1 Tablet(s) Oral daily  pantoprazole Infusion 8 mG/Hr (10 mL/Hr) IV Continuous <Continuous>  polyethylene glycol 3350 17 Gram(s) Oral daily  tamsulosin 0.4 milliGRAM(s) Oral at bedtime    MEDICATIONS  (PRN):  dextrose 40% Gel 15 Gram(s) Oral once PRN Blood Glucose LESS THAN 70 milliGRAM(s)/deciliter  glucagon  Injectable 1 milliGRAM(s) IntraMuscular once PRN Glucose LESS THAN 70 milligrams/deciliter  polyethylene glycol 3350 17 Gram(s) Oral daily PRN Constipation      I&O's Detail    14 May 2020 07:01  -  15 May 2020 07:00  --------------------------------------------------------  IN:    pantoprazole Infusion: 86 mL  Total IN: 86 mL    OUT:  Total OUT: 0 mL    Total NET: 86 mL      Vital Signs Last 24 Hrs  T(C): 35.8 (15 May 2020 07:42), Max: 37.2 (14 May 2020 17:05)  T(F): 96.4 (15 May 2020 07:42), Max: 99 (14 May 2020 17:05)  HR: 62 (15 May 2020 09:13) (60 - 75)  BP: 105/48 (15 May 2020 09:13) (96/43 - 107/44)  BP(mean): --  RR: 18 (15 May 2020 09:13) (16 - 18)  SpO2: 100% (15 May 2020 05:11) (100% - 100%)    PHYSICAL EXAM:  Alert and comfortable. seen on hd  GENERAL: No distress  CHEST/LUNG: Clear to aus  HEART: S1S2 RRR  ABDOMEN: soft  EXTREMITIES: trace edema  SKIN:     LABS:                                     8.7    6.09  )-----------( 114      ( 15 May 2020 07:40 )             26.2                         9.4    6.10  )-----------( 112      ( 14 May 2020 09:08 )             29.4                         8.2    7.42  )-----------( 117      ( 13 May 2020 08:00 )             25.5         05-15    134<L>  |  98  |  75<H>  ----------------------------<  179<H>  4.7   |  26  |  6.24<H>    Ca    8.4<L>      15 May 2020 07:40  Phos  4.4     05-15  Mg     2.3     05-15    TPro  x   /  Alb  2.4<L>  /  TBili  x   /  DBili  x   /  AST  x   /  ALT  x   /  AlkPhos  x   05-15

## 2020-05-15 NOTE — DISCHARGE NOTE NURSING/CASE MANAGEMENT/SOCIAL WORK - PATIENT PORTAL LINK FT
You can access the FollowMyHealth Patient Portal offered by [facility] by registering at the following website: [email or web address]. By joining [employer]’s FollowMyHealth portal, you will also be able to view your health information using other applications (apps) compatible with our system.

## 2020-05-15 NOTE — PROGRESS NOTE ADULT - SUBJECTIVE AND OBJECTIVE BOX
Patient is a 85y old  Male who presents with a chief complaint of GI bleed (15 May 2020 09:16)      HPI:  HPI: The patient is an 86 yo male with Hx. 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 The patient states he noted blood in the stool intermittently for the past 2 weeks. He states he could not get a doctors appointment. He states he lives with her daughter who is administering his medications and he does not know what medicine he is taking.  Hg in the ED was 5.9 and he was started on blood transfusion 3 PRBC  via dialysis in the ED.           pt comf, had 2 nonbloody BM yest and drop in HCT noted  neg abd pain  shawn diet and on HD      PMHx: : ESRD on dialysis m/w/f, HTN, DM2, Hypothyroid, cutaneous T Cell lymphoma, AS, SANG, SSCA , TIA, Gout, CHF, CAD, BPH , AS, A. fib    PSHx: TAVAR,, hernia repair.     Family Hx: fater  of skin CA, mother  of MI,     Social Hx.: not smoking, no alcohol use              Lab:                       5.9    7.14  )-----------( 149      ( 09 May 2020 17:45 )             19.5           140  |  100  |  42<H>  ----------------------------<  131<H>  3.6   |  32<H>  |  5.20<H>    Ca    8.3<L>      09 May 2020 15:47    TPro  6.3  /  Alb  2.7<L>  /  TBili  0.4  /  DBili  x   /  AST  24  /  ALT  23  /  AlkPhos  79  - (09 May 2020 19:00)      PAST MEDICAL & SURGICAL HISTORY:  Afib  TIA (transient ischemic attack)  CAD (coronary artery disease)  OAB (overactive bladder)  Gout  LBBB (left bundle branch block)  CHF (congestive heart failure)  Aortic valve stenosis  Squamous cell carcinoma of skin  Pleural effusion on left  SANG (obstructive sleep apnea)  ESRD on dialysis  BPH (benign prostatic hyperplasia)  Cutaneous T-cell lymphoma  Hypothyroid  Hypercholesteremia  DM (diabetes mellitus)  HTN (hypertension)  H/O eye surgery  S/P coronary artery stent placement  S/P TAVR (transcatheter aortic valve replacement)  H/O hernia repair      MEDICATIONS  (STANDING):  apixaban 2.5 milliGRAM(s) Oral two times a day  carvedilol 12.5 milliGRAM(s) Oral every 12 hours  cholecalciferol 2000 Unit(s) Oral daily  dextrose 5%. 1000 milliLiter(s) (50 mL/Hr) IV Continuous <Continuous>  dextrose 50% Injectable 12.5 Gram(s) IV Push once  dextrose 50% Injectable 25 Gram(s) IV Push once  dextrose 50% Injectable 25 Gram(s) IV Push once  diltiazem    Tablet 30 milliGRAM(s) Oral three times a day  epoetin-shakila-epbx (RETACRIT) Injectable 83838 Unit(s) SubCutaneous every 7 days  finasteride 5 milliGRAM(s) Oral daily  insulin lispro (HumaLOG) corrective regimen sliding scale   SubCutaneous three times a day before meals  levothyroxine 75 MICROGram(s) Oral daily  multivitamin 1 Tablet(s) Oral daily  pantoprazole  Injectable 40 milliGRAM(s) IV Push two times a day  polyethylene glycol 3350 17 Gram(s) Oral daily  tamsulosin 0.4 milliGRAM(s) Oral at bedtime    MEDICATIONS  (PRN):  acetaminophen   Tablet .. 650 milliGRAM(s) Oral every 6 hours PRN Moderate Pain (4 - 6)  dextrose 40% Gel 15 Gram(s) Oral once PRN Blood Glucose LESS THAN 70 milliGRAM(s)/deciliter  glucagon  Injectable 1 milliGRAM(s) IntraMuscular once PRN Glucose LESS THAN 70 milligrams/deciliter  polyethylene glycol 3350 17 Gram(s) Oral daily PRN Constipation      Allergies    No Known Allergies    Intolerances        SOCIAL HISTORY:NC    FAMILY HISTORY:  No pertinent family history in first degree relatives      REVIEW OF SYSTEMS:    CONSTITUTIONAL: No weakness, fevers or chills  EYES/ENT: No visual changes;  No vertigo or throat pain   NECK: No pain or stiffness  RESPIRATORY: No cough, wheezing, hemoptysis; No shortness of breath  CARDIOVASCULAR: No chest pain or palpitations  GENITOURINARY: No dysuria, frequency or hematuria  NEUROLOGICAL: No numbness or weakness  SKIN: No itching, burning, rashes, or lesions   All other review of systems is negative unless indicated above.    Vital Signs Last 24 Hrs  T(C): 35.8 (15 May 2020 07:42), Max: 37.2 (14 May 2020 17:05)  T(F): 96.4 (15 May 2020 07:42), Max: 99 (14 May 2020 17:05)  HR: 67 (15 May 2020 10:03) (60 - 75)  BP: 105/45 (15 May 2020 10:03) (96/43 - 112/45)  BP(mean): --  RR: 16 (15 May 2020 10:03) (16 - 18)  SpO2: 100% (15 May 2020 05:11) (100% - 100%)    PHYSICAL EXAM:    Constitutional: NAD, well-developed  HEENT: EOMI, throat clear  Neck: No LAD, supple  Respiratory: CTA and P  Cardiovascular: S1 and S2, RRR, no M  Gastrointestinal: BS+, soft, NT/ND, neg HSM,  Extremities: No peripheral edema, neg clubing, cyanosis  Vascular: 2+ peripheral pulses  Neurological: A/O x 3, no focal deficits  Psychiatric: Normal mood, normal affect  Skin: No rashes    LABS:  CBC Full  -  ( 15 May 2020 07:40 )  WBC Count : 6.09 K/uL  RBC Count : 2.56 M/uL  Hemoglobin : 8.7 g/dL  Hematocrit : 26.2 %  Platelet Count - Automated : 114 K/uL  Mean Cell Volume : 102.3 fl  Mean Cell Hemoglobin : 34.0 pg  Mean Cell Hemoglobin Concentration : 33.2 gm/dL  Auto Neutrophil # : 4.56 K/uL  Auto Lymphocyte # : 0.73 K/uL  Auto Monocyte # : 0.59 K/uL  Auto Eosinophil # : 0.16 K/uL  Auto Basophil # : 0.01 K/uL  Auto Neutrophil % : 74.8 %  Auto Lymphocyte % : 12.0 %  Auto Monocyte % : 9.7 %  Auto Eosinophil % : 2.6 %  Auto Basophil % : 0.2 %    05-15    134<L>  |  98  |  75<H>  ----------------------------<  179<H>  4.7   |  26  |  6.24<H>    Ca    8.4<L>      15 May 2020 07:40  Phos  4.4     05-15  Mg     2.3     05-15    TPro  x   /  Alb  2.4<L>  /  TBili  x   /  DBili  x   /  AST  x   /  ALT  x   /  AlkPhos  x   05-15        05-09 @ 15:47  Hep A Igm Nonreact  Hep A total ab, IgA and M --  Hep B core Ab total --  Hep B core IgM Nonreact  Hep B DNA PCR --  Hep BSAg --  Hep BSAb --  Hep BSAb --  HCV Ab --  HCV RNA Log --  HCV RNA interp --                RADIOLOGY & ADDITIONAL STUDIES:

## 2020-05-15 NOTE — DISCHARGE NOTE PROVIDER - NSDCADMDATE_GEN_ALL_CORE_FT
Addended by: LING CASTILLO on: 4/20/2018 08:45 AM     Modules accepted: Orders    
09-May-2020 16:57

## 2020-05-15 NOTE — PROGRESS NOTE ADULT - ASSESSMENT
86 yo male with a PMH htn, hld, dm, hypothyroid, t cell lymphoma, a fib on plavix and eliquis, s/p TAVR, ESRD (dialysis m/w/f, last dialysis was friday), was sent in by his doctor for low hemoglobin. Pt states he noticed black stool, intermittent for 15 days and feeling weak. Denies f/c/sweating, cough, cp, sob, n/v/d.  Pt on HD 3 x a week on MWF  found to have low HGB and sent to ED  Dx with active GI bleed, guaiac + stool  CXR clear    a/p  Active GI bleed   will dialyze and give 3 u prbc x 3 hours treatment  since pt is actively bleeding, no BRBPR but + guaiac in rectum    5/11 MK   - ESRD tolerating HD MWF schedule    limited uf with HD   - HTN  dc hydralazine for now  and monitor trend of the bp  - Anemia with component of Acute blood loss    colon neg 1 yr ago, egd after 2 neg covid     start epogen 10 K sq     5/12  for HD in am  s/p EGD, AVM  no new events    5/13 SY  --ESRD : HD order and tx reviewed.  Tolerating tx well.  --GIB : post EGD : Post Gastric AVM clipping.  Transfuse additional PRBC today.  --From renal standpoint, ok for d/c.    5/14  As above  stable for dc from renal standpoint    5/15  seen on HD  stable for dc

## 2020-05-15 NOTE — PROGRESS NOTE ADULT - REASON FOR ADMISSION
GI bleed

## 2020-05-15 NOTE — DISCHARGE NOTE PROVIDER - NSDCMRMEDTOKEN_GEN_ALL_CORE_FT
aspirin 81 mg oral delayed release tablet: 1 tab(s) orally once a day  Avodart: 1 cap(s) orally once a day  Cardizem 30 mg oral tablet: 1 tab(s) orally 3 times a day  carvedilol 25 mg oral tablet: 1 tab(s) orally 2 times a day  cholecalciferol oral tablet: 2000 unit(s) orally once a day  Demadex 20 mg oral tablet: 2 tab(s) orally once a day  Fish Oil 1200 mg oral capsule:  orally 2 times a day  Flomax 0.4 mg oral capsule: 2 cap(s) orally once a day  hydrALAZINE 25 mg oral tablet: 1 tab(s) orally 3 times a day  ibuprofen 400 mg oral tablet: 1 tab(s) orally 1 to 2 times a day, As Needed -pain  levothyroxine 75 mcg (0.075 mg) oral tablet: 1 tab(s) orally once a day  MiraLax - oral powder for reconstitution: 17 gram(s) orally once a day, As Needed  Multiple Vitamins oral tablet: 1 tab(s) orally once a day  triamcinolone 0.1% topical cream: Apply topically to affected area   triamcinolone 0.5% topical ointment: Apply topically to affected area   TriCor 145 mg oral tablet: 1 tab(s) orally once a day

## 2020-05-15 NOTE — DISCHARGE NOTE PROVIDER - HOSPITAL COURSE
84 yo male with Hx. of ESRD on hemodialysis , Atrial fib on Eliquis s/p watchman,  CAD s/p cardiac stents X2 in july 2019, TAVR december 2019, HTN, DMII, hypothyroid, cutaneous T cell lymphoma, SANG, TIA, Diastolic CHF, BPH, Gout, who was sent to H.H. with anemia, GI bleed. He noted blood in the stool intermittently for the past 2 weeks. He could not get a doctors appointment.     Hg in the ED was 5.9 and he was given 3 units PRBCS via HD.     He was admitted with Upper GI bleed. He was kept npo. A/C and asa was held. 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 He also was noted to have an esophageal stricture which was dilated. case d/w family regarding risks/benefits of restarting a/c + asa.     They opted to restart low dose eliquis and asa if h/h remained stable. His eliquis has been restarted. There is no evidence of bleeding. HE will be discharged home today. His h/h will be monitored @ HD twice a week. If h/h stable, he may resume asa 81mg next week wednesday.         Vital Signs Last 24 Hrs    T(C): 36.9 (15 May 2020 10:53), Max: 37.2 (14 May 2020 17:05)    T(F): 98.4 (15 May 2020 10:53), Max: 99 (14 May 2020 17:05)    HR: 74 (15 May 2020 11:20) (60 - 75)    BP: 114/49 (15 May 2020 11:20) (96/43 - 119/50)    RR: 16 (15 May 2020 11:20) (16 - 18)    SpO2: 100% (15 May 2020 11:20) (100% - 100%)        PHYSICAL EXAM:        GENERAL: Comfortable, no acute distress     HEAD:  Normocephalic, atraumatic    EYES: EOMI, PERRLA    HEENT: Moist mucous membranes    NECK: Supple, No JVD    NERVOUS SYSTEM:  Alert & Oriented X3, Motor Strength 5/5 B/L upper and lower extremities    CHEST/LUNG: Clear to auscultation bilaterally    HEART: Regular rate and rhythm    ABDOMEN: Soft, Nontender, Nondistended, Bowel sounds present    GENITOURINARY: Voiding, no palpable bladder    EXTREMITIES:   No clubbing, cyanosis. LUE with local edema, mild erythema, no palpable cord at previous iv site.    MUSCULOSKELETAL- No muscle tenderness, no joint tenderness    SKIN-no rash        LABS:                            9.2      5.75  )-----------( 122      ( 15 May 2020 11:48 )               28.2         05-15        134<L>  |  98  |  75<H>    ----------------------------<  179<H>    4.7   |  26  |  6.24<H>        Ca    8.4<L>      15 May 2020 07:40    Phos  4.4     05-15    Mg     2.3     05-15        TPro  x   /  Alb  2.4<L>  /  TBili  x   /  DBili  x   /  AST  x   /  ALT  x   /  AlkPhos  x   05-15        EGD: erythematous mucose in antrum. single duodenal polyp resected and retrieved. benign appearing esophageal stenosis dilated. single bleeding angiodysplatic lesion in the stomach. injected and bleeding noted after after ismi lateral to avm. Treated with argon plasma coagulation and bleeding controlled. Clips (MR conditional) were placed.         Xray Chest 1 View-PORTABLE IMMEDIATE (05.09.20 @ 15:55) >    Impression: No active pulmonary disease. Cardiomegaly.        FINAL DIAGNOSIS:    1. Acute blood loss anemia d/t upper GI bleed from gastric AVM:    -s/p EGD/cauterization    -s/p dilation of esophageal stricture.     2. H/o Cad/Stents    3. A fib on eliquis, h/o TIA    4. ESRD ON HD    5. Hypothyroid    6. DMII    7. HTN    8. BPH    9. Diastolic CHF    10. IV infiltration LUE    d/w patient/daughter over telephone/dr. osorio/dr. bahena

## 2020-05-15 NOTE — DISCHARGE NOTE NURSING/CASE MANAGEMENT/SOCIAL WORK - NSDCPEPTSTRK_GEN_ALL_CORE
Need for follow up after discharge/Prescribed medications/Risk factors for stroke/Stroke education booklet/Stroke warning signs and symptoms/Stroke support groups for patients, families, and friends/Signs and symptoms of stroke/Call 911 for stroke

## 2020-05-15 NOTE — PROGRESS NOTE ADULT - ASSESSMENT
84 yo male with Hx. 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 The patient states he noted blood in the stool intermittently for the past 2 weeks. He states he could not get a doctors appointment. He states he lives with her daughter who is administering his medications and he does not know what medicine he is taking.  Hg in the ED was 5.9 and he was started on blood transfusion 3 PRBC  via dialysis in the ED.   GI consult obtained from Dr. Diaz and Porotonix drip was started.  he had GI eval. 2 years ago.    GI bleed secondary to gastric AVM that was treated.  Patient at high risk of having other AVMs elsewhere with ESRD and HX of AV dx  He had been on anticoagulation previously secondary to atrial fibrillation.  He is also had a history of TIA.  In light of increased risk of cardioembolic disease, anticoagulation can be reconsidered with a higher risk of bleeding.  Would maintain patient on Protonix and consider lower dosage of anticoagulation if deemed appropriate.  mild dec in HCT noted and will monitor pt and repeat HCT in AM    He does have multiple comorbid disorders including end-stage renal disease, coronary artery disease, history of diastolic CHF in the past as well as pulmonary hypertension.    If tolerates anticoagulation, aspirin can be resumed thereafter, await HCT tomorrow  Close follow-up of his hematocrit would be required.    He had a colonoscopy last year however, results of this are not available to us.  We have requested this but have not received it.  Per daughter, this colonoscopy was negative.    Discussed with hospitalist, they will arrange for follow-up either with me or with patient's personal gastroenterologist.    discussed with  daughter Brigitte  previously        Hx of skin ca but not melanoma per daughter

## 2020-05-15 NOTE — DISCHARGE NOTE PROVIDER - NSDCCPCAREPLAN_GEN_ALL_CORE_FT
PRINCIPAL DISCHARGE DIAGNOSIS  Diagnosis: Anemia, unspecified type  Assessment and Plan of Treatment: due to GI bleeding from AVM.   Have hemoglobin monitored at dialysis twice a week.   if hemoglobin is stable and no bleeding, can resume aspirin 81mg next week wednesday. (5/20).   Follow up with gastroenterology in 1 week.      SECONDARY DISCHARGE DIAGNOSES  Diagnosis: Gastrointestinal hemorrhage, unspecified gastrointestinal hemorrhage type  Assessment and Plan of Treatment:

## 2020-05-15 NOTE — DISCHARGE NOTE PROVIDER - NSDCFUSCHEDAPPT_GEN_ALL_CORE_FT
[facility] Progress Note    Date: 2018    Name: Denise Verde    MRN: [id number]         : 1950     IV Antibiotics    Ms. Abdullahi Petersen was assessed and education was provided. Ms. Thad Chung vitals were reviewed. Visit Vitals  /60 (BP 1 Location: Left arm, BP Patient Position: Sitting)   Pulse 75   Temp 98.3 °F (36.8 °C)   Resp 18   SpO2 98%             []  Vancomycin     [x]  Invanz 1 gram IV infusion     []  Cubicin     []  Rocephin    was infused at  100 ml/hr. Ms. Abdullahi Petersen tolerated infusion, and had no complaints at this time. Patient armband removed and shredded. Ms. Abdullahi Petersen was discharged from [place] [address] in stable condition at 1150. She is to return on 18 for her next appointment.     Govind Hope RN  2018  12:14 PM
TAMY WHITE ; 06/04/2020 ; [facility]

## 2020-05-18 DIAGNOSIS — Z99.2 DEPENDENCE ON RENAL DIALYSIS: ICD-10-CM

## 2020-05-18 DIAGNOSIS — I13.2 HYPERTENSIVE HEART AND CHRONIC KIDNEY DISEASE WITH HEART FAILURE AND WITH STAGE 5 CHRONIC KIDNEY DISEASE, OR END STAGE RENAL DISEASE: ICD-10-CM

## 2020-05-18 DIAGNOSIS — Z79.82 LONG TERM (CURRENT) USE OF ASPIRIN: ICD-10-CM

## 2020-05-18 DIAGNOSIS — I50.32 CHRONIC DIASTOLIC (CONGESTIVE) HEART FAILURE: ICD-10-CM

## 2020-05-18 DIAGNOSIS — M10.9 GOUT, UNSPECIFIED: ICD-10-CM

## 2020-05-18 DIAGNOSIS — Z79.01 LONG TERM (CURRENT) USE OF ANTICOAGULANTS: ICD-10-CM

## 2020-05-18 DIAGNOSIS — I27.20 PULMONARY HYPERTENSION, UNSPECIFIED: ICD-10-CM

## 2020-05-18 DIAGNOSIS — D62 ACUTE POSTHEMORRHAGIC ANEMIA: ICD-10-CM

## 2020-05-18 DIAGNOSIS — E03.9 HYPOTHYROIDISM, UNSPECIFIED: ICD-10-CM

## 2020-05-18 DIAGNOSIS — I25.10 ATHEROSCLEROTIC HEART DISEASE OF NATIVE CORONARY ARTERY WITHOUT ANGINA PECTORIS: ICD-10-CM

## 2020-05-18 DIAGNOSIS — I48.19 OTHER PERSISTENT ATRIAL FIBRILLATION: ICD-10-CM

## 2020-05-18 DIAGNOSIS — E11.22 TYPE 2 DIABETES MELLITUS WITH DIABETIC CHRONIC KIDNEY DISEASE: ICD-10-CM

## 2020-05-18 DIAGNOSIS — N18.6 END STAGE RENAL DISEASE: ICD-10-CM

## 2020-05-18 DIAGNOSIS — E78.00 PURE HYPERCHOLESTEROLEMIA, UNSPECIFIED: ICD-10-CM

## 2020-05-18 DIAGNOSIS — Z85.72 PERSONAL HISTORY OF NON-HODGKIN LYMPHOMAS: ICD-10-CM

## 2020-05-18 DIAGNOSIS — K31.7 POLYP OF STOMACH AND DUODENUM: ICD-10-CM

## 2020-05-18 DIAGNOSIS — N40.0 BENIGN PROSTATIC HYPERPLASIA WITHOUT LOWER URINARY TRACT SYMPTOMS: ICD-10-CM

## 2020-05-18 DIAGNOSIS — K44.9 DIAPHRAGMATIC HERNIA WITHOUT OBSTRUCTION OR GANGRENE: ICD-10-CM

## 2020-05-18 DIAGNOSIS — K31.811 ANGIODYSPLASIA OF STOMACH AND DUODENUM WITH BLEEDING: ICD-10-CM

## 2020-05-18 DIAGNOSIS — Z85.828 PERSONAL HISTORY OF OTHER MALIGNANT NEOPLASM OF SKIN: ICD-10-CM

## 2020-05-18 DIAGNOSIS — K22.2 ESOPHAGEAL OBSTRUCTION: ICD-10-CM

## 2020-05-18 DIAGNOSIS — Z86.73 PERSONAL HISTORY OF TRANSIENT ISCHEMIC ATTACK (TIA), AND CEREBRAL INFARCTION WITHOUT RESIDUAL DEFICITS: ICD-10-CM

## 2020-05-18 DIAGNOSIS — Z95.5 PRESENCE OF CORONARY ANGIOPLASTY IMPLANT AND GRAFT: ICD-10-CM

## 2020-05-18 DIAGNOSIS — E78.5 HYPERLIPIDEMIA, UNSPECIFIED: ICD-10-CM

## 2020-05-18 DIAGNOSIS — Z95.2 PRESENCE OF PROSTHETIC HEART VALVE: ICD-10-CM

## 2020-05-22 ENCOUNTER — TRANSCRIPTION ENCOUNTER (OUTPATIENT)
Age: 85
End: 2020-05-22

## 2020-05-23 ENCOUNTER — INPATIENT (INPATIENT)
Facility: HOSPITAL | Age: 85
LOS: 8 days | Discharge: HOME HEALTH | DRG: 377 | End: 2020-06-01
Attending: INTERNAL MEDICINE | Admitting: INTERNAL MEDICINE
Payer: MEDICARE

## 2020-05-23 VITALS — WEIGHT: 160.06 LBS | HEIGHT: 65 IN

## 2020-05-23 DIAGNOSIS — Z98.890 OTHER SPECIFIED POSTPROCEDURAL STATES: Chronic | ICD-10-CM

## 2020-05-23 DIAGNOSIS — Z95.2 PRESENCE OF PROSTHETIC HEART VALVE: Chronic | ICD-10-CM

## 2020-05-23 DIAGNOSIS — Z95.5 PRESENCE OF CORONARY ANGIOPLASTY IMPLANT AND GRAFT: Chronic | ICD-10-CM

## 2020-05-23 DIAGNOSIS — Z98.89 OTHER SPECIFIED POSTPROCEDURAL STATES: Chronic | ICD-10-CM

## 2020-05-23 DIAGNOSIS — U07.1 COVID-19: ICD-10-CM

## 2020-05-23 PROBLEM — M10.9 GOUT, UNSPECIFIED: Chronic | Status: ACTIVE | Noted: 2020-05-12

## 2020-05-23 PROBLEM — N32.81 OVERACTIVE BLADDER: Chronic | Status: ACTIVE | Noted: 2020-05-12

## 2020-05-23 PROBLEM — I48.91 UNSPECIFIED ATRIAL FIBRILLATION: Chronic | Status: ACTIVE | Noted: 2020-05-12

## 2020-05-23 PROBLEM — I35.0 NONRHEUMATIC AORTIC (VALVE) STENOSIS: Chronic | Status: ACTIVE | Noted: 2020-05-12

## 2020-05-23 PROBLEM — G45.9 TRANSIENT CEREBRAL ISCHEMIC ATTACK, UNSPECIFIED: Chronic | Status: ACTIVE | Noted: 2020-05-12

## 2020-05-23 PROBLEM — J90 PLEURAL EFFUSION, NOT ELSEWHERE CLASSIFIED: Chronic | Status: ACTIVE | Noted: 2020-05-12

## 2020-05-23 PROBLEM — N18.6 END STAGE RENAL DISEASE: Chronic | Status: ACTIVE | Noted: 2020-05-12

## 2020-05-23 PROBLEM — I44.7 LEFT BUNDLE-BRANCH BLOCK, UNSPECIFIED: Chronic | Status: ACTIVE | Noted: 2020-05-12

## 2020-05-23 PROBLEM — I50.9 HEART FAILURE, UNSPECIFIED: Chronic | Status: ACTIVE | Noted: 2020-05-12

## 2020-05-23 PROBLEM — I25.10 ATHEROSCLEROTIC HEART DISEASE OF NATIVE CORONARY ARTERY WITHOUT ANGINA PECTORIS: Chronic | Status: ACTIVE | Noted: 2020-05-12

## 2020-05-23 PROBLEM — G47.33 OBSTRUCTIVE SLEEP APNEA (ADULT) (PEDIATRIC): Chronic | Status: ACTIVE | Noted: 2020-05-12

## 2020-05-23 PROBLEM — C44.92 SQUAMOUS CELL CARCINOMA OF SKIN, UNSPECIFIED: Chronic | Status: ACTIVE | Noted: 2020-05-12

## 2020-05-23 LAB
ANION GAP SERPL CALC-SCNC: 10 MMOL/L — SIGNIFICANT CHANGE UP (ref 5–17)
BUN SERPL-MCNC: 66 MG/DL — HIGH (ref 7–23)
CALCIUM SERPL-MCNC: 8.4 MG/DL — LOW (ref 8.5–10.1)
CHLORIDE SERPL-SCNC: 102 MMOL/L — SIGNIFICANT CHANGE UP (ref 96–108)
CO2 SERPL-SCNC: 26 MMOL/L — SIGNIFICANT CHANGE UP (ref 22–31)
CREAT SERPL-MCNC: 6.82 MG/DL — HIGH (ref 0.5–1.3)
GLUCOSE SERPL-MCNC: 104 MG/DL — HIGH (ref 70–99)
HAV IGM SER-ACNC: SIGNIFICANT CHANGE UP
HBV CORE IGM SER-ACNC: SIGNIFICANT CHANGE UP
HBV SURFACE AG SER-ACNC: SIGNIFICANT CHANGE UP
HCT VFR BLD CALC: 22.3 % — LOW (ref 39–50)
HCV AB S/CO SERPL IA: 0.16 S/CO — SIGNIFICANT CHANGE UP (ref 0–0.99)
HCV AB SERPL-IMP: SIGNIFICANT CHANGE UP
HGB BLD-MCNC: 7 G/DL — CRITICAL LOW (ref 13–17)
MAGNESIUM SERPL-MCNC: 2.2 MG/DL — SIGNIFICANT CHANGE UP (ref 1.6–2.6)
MCHC RBC-ENTMCNC: 31.4 GM/DL — LOW (ref 32–36)
MCHC RBC-ENTMCNC: 32.4 PG — SIGNIFICANT CHANGE UP (ref 27–34)
MCV RBC AUTO: 103.2 FL — HIGH (ref 80–100)
PHOSPHATE SERPL-MCNC: 4.5 MG/DL — SIGNIFICANT CHANGE UP (ref 2.5–4.5)
PLATELET # BLD AUTO: 130 K/UL — LOW (ref 150–400)
POTASSIUM SERPL-MCNC: 3.9 MMOL/L — SIGNIFICANT CHANGE UP (ref 3.5–5.3)
POTASSIUM SERPL-SCNC: 3.9 MMOL/L — SIGNIFICANT CHANGE UP (ref 3.5–5.3)
RBC # BLD: 2.16 M/UL — LOW (ref 4.2–5.8)
RBC # FLD: 15.8 % — HIGH (ref 10.3–14.5)
SODIUM SERPL-SCNC: 138 MMOL/L — SIGNIFICANT CHANGE UP (ref 135–145)
WBC # BLD: 4.62 K/UL — SIGNIFICANT CHANGE UP (ref 3.8–10.5)
WBC # FLD AUTO: 4.62 K/UL — SIGNIFICANT CHANGE UP (ref 3.8–10.5)

## 2020-05-23 PROCEDURE — 86900 BLOOD TYPING SEROLOGIC ABO: CPT

## 2020-05-23 PROCEDURE — 93010 ELECTROCARDIOGRAM REPORT: CPT

## 2020-05-23 PROCEDURE — U0003: CPT

## 2020-05-23 PROCEDURE — 71045 X-RAY EXAM CHEST 1 VIEW: CPT | Mod: 26

## 2020-05-23 PROCEDURE — 86901 BLOOD TYPING SEROLOGIC RH(D): CPT

## 2020-05-23 PROCEDURE — 90935 HEMODIALYSIS ONE EVALUATION: CPT

## 2020-05-23 PROCEDURE — 80069 RENAL FUNCTION PANEL: CPT

## 2020-05-23 PROCEDURE — 86850 RBC ANTIBODY SCREEN: CPT

## 2020-05-23 PROCEDURE — 85027 COMPLETE CBC AUTOMATED: CPT

## 2020-05-23 PROCEDURE — 82962 GLUCOSE BLOOD TEST: CPT

## 2020-05-23 PROCEDURE — P9016: CPT

## 2020-05-23 PROCEDURE — 80048 BASIC METABOLIC PNL TOTAL CA: CPT

## 2020-05-23 PROCEDURE — 36415 COLL VENOUS BLD VENIPUNCTURE: CPT

## 2020-05-23 PROCEDURE — 88305 TISSUE EXAM BY PATHOLOGIST: CPT

## 2020-05-23 PROCEDURE — 99223 1ST HOSP IP/OBS HIGH 75: CPT

## 2020-05-23 PROCEDURE — 74177 CT ABD & PELVIS W/CONTRAST: CPT

## 2020-05-23 PROCEDURE — 85025 COMPLETE CBC W/AUTO DIFF WBC: CPT

## 2020-05-23 PROCEDURE — 36430 TRANSFUSION BLD/BLD COMPNT: CPT

## 2020-05-23 PROCEDURE — C1889: CPT

## 2020-05-23 PROCEDURE — 71045 X-RAY EXAM CHEST 1 VIEW: CPT

## 2020-05-23 PROCEDURE — 97116 GAIT TRAINING THERAPY: CPT | Mod: GP

## 2020-05-23 PROCEDURE — 97162 PT EVAL MOD COMPLEX 30 MIN: CPT | Mod: GP

## 2020-05-23 PROCEDURE — 86923 COMPATIBILITY TEST ELECTRIC: CPT

## 2020-05-23 PROCEDURE — 88312 SPECIAL STAINS GROUP 1: CPT

## 2020-05-23 RX ORDER — DEXTROSE 50 % IN WATER 50 %
12.5 SYRINGE (ML) INTRAVENOUS ONCE
Refills: 0 | Status: DISCONTINUED | OUTPATIENT
Start: 2020-05-23 | End: 2020-05-25

## 2020-05-23 RX ORDER — PANTOPRAZOLE SODIUM 20 MG/1
40 TABLET, DELAYED RELEASE ORAL
Refills: 0 | Status: DISCONTINUED | OUTPATIENT
Start: 2020-05-23 | End: 2020-06-01

## 2020-05-23 RX ORDER — HEPARIN SODIUM 5000 [USP'U]/ML
1 INJECTION INTRAVENOUS; SUBCUTANEOUS ONCE
Refills: 0 | Status: COMPLETED | OUTPATIENT
Start: 2020-05-23 | End: 2020-05-23

## 2020-05-23 RX ORDER — ERYTHROPOIETIN 10000 [IU]/ML
20000 INJECTION, SOLUTION INTRAVENOUS; SUBCUTANEOUS
Refills: 0 | Status: DISCONTINUED | OUTPATIENT
Start: 2020-05-23 | End: 2020-06-01

## 2020-05-23 RX ORDER — DEXTROSE 50 % IN WATER 50 %
25 SYRINGE (ML) INTRAVENOUS ONCE
Refills: 0 | Status: DISCONTINUED | OUTPATIENT
Start: 2020-05-23 | End: 2020-05-25

## 2020-05-23 RX ORDER — APIXABAN 2.5 MG/1
2.5 TABLET, FILM COATED ORAL
Refills: 0 | Status: DISCONTINUED | OUTPATIENT
Start: 2020-05-23 | End: 2020-05-23

## 2020-05-23 RX ORDER — OXYCODONE HYDROCHLORIDE 5 MG/1
5 TABLET ORAL ONCE
Refills: 0 | Status: DISCONTINUED | OUTPATIENT
Start: 2020-05-23 | End: 2020-05-23

## 2020-05-23 RX ORDER — FAMOTIDINE 10 MG/ML
20 INJECTION INTRAVENOUS DAILY
Refills: 0 | Status: DISCONTINUED | OUTPATIENT
Start: 2020-05-23 | End: 2020-06-01

## 2020-05-23 RX ORDER — SEVELAMER CARBONATE 2400 MG/1
800 POWDER, FOR SUSPENSION ORAL THREE TIMES A DAY
Refills: 0 | Status: DISCONTINUED | OUTPATIENT
Start: 2020-05-23 | End: 2020-06-01

## 2020-05-23 RX ORDER — INSULIN LISPRO 100/ML
VIAL (ML) SUBCUTANEOUS
Refills: 0 | Status: DISCONTINUED | OUTPATIENT
Start: 2020-05-23 | End: 2020-05-25

## 2020-05-23 RX ORDER — ASPIRIN/CALCIUM CARB/MAGNESIUM 324 MG
81 TABLET ORAL DAILY
Refills: 0 | Status: DISCONTINUED | OUTPATIENT
Start: 2020-05-23 | End: 2020-05-24

## 2020-05-23 RX ORDER — DEXTROSE 50 % IN WATER 50 %
15 SYRINGE (ML) INTRAVENOUS ONCE
Refills: 0 | Status: DISCONTINUED | OUTPATIENT
Start: 2020-05-23 | End: 2020-05-25

## 2020-05-23 RX ORDER — TAMSULOSIN HYDROCHLORIDE 0.4 MG/1
0.4 CAPSULE ORAL AT BEDTIME
Refills: 0 | Status: DISCONTINUED | OUTPATIENT
Start: 2020-05-23 | End: 2020-06-01

## 2020-05-23 RX ORDER — GLUCAGON INJECTION, SOLUTION 0.5 MG/.1ML
1 INJECTION, SOLUTION SUBCUTANEOUS ONCE
Refills: 0 | Status: DISCONTINUED | OUTPATIENT
Start: 2020-05-23 | End: 2020-05-25

## 2020-05-23 RX ORDER — LANOLIN ALCOHOL/MO/W.PET/CERES
5 CREAM (GRAM) TOPICAL AT BEDTIME
Refills: 0 | Status: DISCONTINUED | OUTPATIENT
Start: 2020-05-23 | End: 2020-06-01

## 2020-05-23 RX ORDER — POLYETHYLENE GLYCOL 3350 17 G/17G
17 POWDER, FOR SOLUTION ORAL DAILY
Refills: 0 | Status: DISCONTINUED | OUTPATIENT
Start: 2020-05-23 | End: 2020-05-24

## 2020-05-23 RX ORDER — LEVOTHYROXINE SODIUM 125 MCG
75 TABLET ORAL DAILY
Refills: 0 | Status: DISCONTINUED | OUTPATIENT
Start: 2020-05-23 | End: 2020-06-01

## 2020-05-23 RX ORDER — METOPROLOL TARTRATE 50 MG
25 TABLET ORAL
Refills: 0 | Status: DISCONTINUED | OUTPATIENT
Start: 2020-05-23 | End: 2020-06-01

## 2020-05-23 RX ORDER — SODIUM CHLORIDE 9 MG/ML
1000 INJECTION, SOLUTION INTRAVENOUS
Refills: 0 | Status: DISCONTINUED | OUTPATIENT
Start: 2020-05-23 | End: 2020-05-25

## 2020-05-23 RX ADMIN — TAMSULOSIN HYDROCHLORIDE 0.4 MILLIGRAM(S): 0.4 CAPSULE ORAL at 23:24

## 2020-05-23 RX ADMIN — SEVELAMER CARBONATE 800 MILLIGRAM(S): 2400 POWDER, FOR SUSPENSION ORAL at 23:24

## 2020-05-23 RX ADMIN — HEPARIN SODIUM 1 DOSE(S): 5000 INJECTION INTRAVENOUS; SUBCUTANEOUS at 18:45

## 2020-05-23 RX ADMIN — APIXABAN 2.5 MILLIGRAM(S): 2.5 TABLET, FILM COATED ORAL at 19:39

## 2020-05-23 RX ADMIN — Medication 5 MILLIGRAM(S): at 23:24

## 2020-05-23 RX ADMIN — ERYTHROPOIETIN 20000 UNIT(S): 10000 INJECTION, SOLUTION INTRAVENOUS; SUBCUTANEOUS at 19:00

## 2020-05-23 RX ADMIN — Medication 25 MILLIGRAM(S): at 19:39

## 2020-05-23 RX ADMIN — OXYCODONE HYDROCHLORIDE 5 MILLIGRAM(S): 5 TABLET ORAL at 13:20

## 2020-05-23 NOTE — ED ADULT NURSE NOTE - NSIMPLEMENTINTERV_GEN_ALL_ED
Implemented All Fall Risk Interventions:  [place] to call system. Call bell, personal items and telephone within reach. Instruct patient to call for assistance. Room bathroom lighting operational. Non-slip footwear when patient is off stretcher. Physically safe environment: no spills, clutter or unnecessary equipment. Stretcher in lowest position, wheels locked, appropriate side rails in place. Provide visual cue, wrist band, yellow gown, etc. Monitor gait and stability. Monitor for mental status changes and reorient to person, place, and time. Review medications for side effects contributing to fall risk. Reinforce activity limits and safety measures with patient and family.

## 2020-05-23 NOTE — ED PROVIDER NOTE - PHYSICAL EXAMINATION
Constitutional: NAD AAOx3  Eyes: PERRLA EOMI  Head: NCAT  Mouth: MMM  Cardiac: s1s2. rrr  Lungs: CTA B/L. No accessory muscle use  Abd: soft, nd. NTTP. no r/g/r  Neuro: CN2-12 intact  Skin: No rashes Constitutional: NAD AAOx3  Eyes: PERRLA EOMI  Head: NCAT  Mouth: MMM  Cardiac: s1s2. rrr  Lungs: CTA B/L. No accessory muscle use  Abd: soft, nd. NTTP. no r/g/r  Neuro: CN2-12 intact  Skin: No rashes    RECTAL: black stool guiac positive

## 2020-05-23 NOTE — H&P ADULT - HISTORY OF PRESENT ILLNESS
HPI: The patient is an 84 yo male with Hx. of end stage CKD on hemodialysis and recent GI bleed, who was sent to the ED from [facility] dialysis because of low grade temp and tested positive for COVID 19. He had EGD for anemia  and found with AVM which was clipped. He was discharged home on  5/15/20.   On 5/18/20 he developed low grade fever. His daughter and son in low tested positive for COVID 19 and were self  isolating in basement. He tested tested positive for COVID 19 on 519/20. He was dialysed on 5/21/20 at  [facility] in isolation. He had nephrology consult  in the ED. The patient was found with low Hg. of 7 and was also complaining of black stool. Referred for admission for GI bleed.      PMHx: DM ESKD, HTN, Hypthyroid , BPH,  cutaneous T cell lymphoma,     PSHx:    Family Hx: not available.    Social Hx.: not smoking, no alcohol use    ROS:   Eyes: no changes in vision    ENT/Mouth: no changes    Cardiovascular: no chest pain    Respiratory: no SOB    Gastrointestinal: no diarrhea, no nausea, no vomiting    Genitourinary: no dysuria    Breast: no pain    Musculoskeletal: no pain    Integumentary: no itching    Neurological: No Headache, no tremor,    Psychiatric: no suicidal ideations    Endocrine: no excessive thirst,     Hematologic/Lymphatic: no swollen glands    Allergic/Immunologic: no itching      Physical Exam: Vital Signs Last 24 Hrs  T(C): 37 (23 May 2020 19:37), Max: 37 (23 May 2020 10:54)  T(F): 98.6 (23 May 2020 19:37), Max: 98.6 (23 May 2020 10:54)  HR: 90 (23 May 2020 19:37) (76 - 90)  BP: 147/65 (23 May 2020 19:37) (119/61 - 150/79)  BP(mean): 75 (23 May 2020 10:54) (75 - 75)  RR: 20 (23 May 2020 19:37) (18 - 20)  SpO2: 97% (23 May 2020 19:37) (97% - 100%)        HEENT: PRRL EOMI    MOUTH/TEETH/GUMS: Clear    NECK: no JVD    LUNGS: Clear    HEART: S1,S2 RR    ABDOMEN: soft nontender    EXTREMITIES:  no pedal edema    MUSCULOSKELETAL: no joint swelling     NEURO: no tremor, no focal signs.    SKIN: no rash    : CVA negative,     Lab:                       7.0    4.62  )-----------( 130      ( 23 May 2020 11:48 )             22.3       05-23    138  |  102  |  66<H>  ----------------------------<  104<H>  3.9   |  26  |  6.82<H>    Ca    8.4<L>      23 May 2020 11:48  Phos  4.5     05-23  Mg     2.2     05-23 HPI: The patient is an 86 yo male with Hx. of end stage CKD on hemodialysis and recent GI bleed, who was sent to the ED from [facility] dialysis because of low grade temp and tested positive for COVID 19. He had EGD for anemia  and found with AVM which was clipped. He was discharged home on  5/15/20.   On 5/18/20 he developed low grade fever. His daughter and son in low tested positive for COVID 19 and were self  isolating in basement. He tested tested positive for COVID 19 on 519/20. He was dialysed on 5/21/20 at  [facility] in isolation. He had nephrology consult  in the ED. The patient was found with low Hg. of 7 and was also complaining of black stool and tested heme positive in the ED. Referred for admission for GI bleed.      PMHx: DM ESKD, HTN, Hypthyroid , BPH,  cutaneous T cell lymphoma,     PSHx: neg    Family Hx: not available.    Social Hx.: not smoking, no alcohol use    ROS:   Eyes: no changes in vision    ENT/Mouth: no changes    Cardiovascular: no chest pain    Respiratory: no SOB    Gastrointestinal: no diarrhea, no nausea, no vomiting    Genitourinary: no dysuria    Breast: no pain    Musculoskeletal: no pain    Integumentary: no itching    Neurological: No Headache, no tremor,    Endocrine: no excessive thirst,     Hematologic/Lymphatic: no swollen glands    Allergic/Immunologic: no itching      Physical Exam: Vital Signs Last 24 Hrs  T(C): 37 (23 May 2020 19:37), Max: 37 (23 May 2020 10:54)  T(F): 98.6 (23 May 2020 19:37), Max: 98.6 (23 May 2020 10:54)  HR: 90 (23 May 2020 19:37) (76 - 90)  BP: 147/65 (23 May 2020 19:37) (119/61 - 150/79)  BP(mean): 75 (23 May 2020 10:54) (75 - 75)  RR: 20 (23 May 2020 19:37) (18 - 20)  SpO2: 97% (23 May 2020 19:37) (97% - 100%)        HEENT: PRRL EOMI    MOUTH/TEETH/GUMS: Clear    NECK: no JVD    LUNGS: Clear    HEART: S1,S2 RR    ABDOMEN: soft nontender, soft , rectal done by ED heme positive.    EXTREMITIES:  no pedal edema    MUSCULOSKELETAL: no joint swelling     NEURO: no tremor, no focal signs.    SKIN: no rash    : CVA negative,     Lab:                       7.0    4.62  )-----------( 130      ( 23 May 2020 11:48 )             22.3       05-23    138  |  102  |  66<H>  ----------------------------<  104<H>  3.9   |  26  |  6.82<H>    Ca    8.4<L>      23 May 2020 11:48  Phos  4.5     05-23  Mg     2.2     05-23 HPI: The patient is an 86 yo male with Hx. of end stage CKD on hemodialysis and recent GI bleed, who was sent to the ED from [facility] dialysis because of low grade temp and tested positive for COVID 19. He had EGD for anemia  and found with AVM which was clipped. He was discharged home on  5/15/20.   On 5/18/20 he developed low grade fever. His daughter and son in low tested positive for COVID 19 and were self  isolating in basement. He tested tested positive for COVID 19 on 519/20. He was dialysed on 5/21/20 at  [facility] in isolation. He had nephrology consult  in the ED. The patient was found with low Hg. of 7 and was also complaining of black stool and tested heme positive in the ED. Referred for admission for GI bleed.      PMHx: DM ESKD, Atrial fib. on Eliquis,  HTN, Hypthyroid , BPH,  cutaneous T cell lymphoma,     PSHx: neg    Family Hx: not available.    Social Hx.: not smoking, no alcohol use    ROS:   Eyes: no changes in vision    ENT/Mouth: no changes    Cardiovascular: no chest pain    Respiratory: no SOB    Gastrointestinal: no diarrhea, no nausea, no vomiting    Genitourinary: no dysuria    Breast: no pain    Musculoskeletal: no pain    Integumentary: no itching    Neurological: No Headache, no tremor,    Endocrine: no excessive thirst,     Hematologic/Lymphatic: no swollen glands    Allergic/Immunologic: no itching      Physical Exam: Vital Signs Last 24 Hrs  T(C): 37 (23 May 2020 19:37), Max: 37 (23 May 2020 10:54)  T(F): 98.6 (23 May 2020 19:37), Max: 98.6 (23 May 2020 10:54)  HR: 90 (23 May 2020 19:37) (76 - 90)  BP: 147/65 (23 May 2020 19:37) (119/61 - 150/79)  BP(mean): 75 (23 May 2020 10:54) (75 - 75)  RR: 20 (23 May 2020 19:37) (18 - 20)  SpO2: 97% (23 May 2020 19:37) (97% - 100%)        HEENT: PRRL EOMI    MOUTH/TEETH/GUMS: Clear    NECK: no JVD    LUNGS: Clear    HEART: S1,S2 RR    ABDOMEN: soft nontender, soft , rectal done by ED heme positive.    EXTREMITIES:  no pedal edema    MUSCULOSKELETAL: no joint swelling     NEURO: no tremor, no focal signs.    SKIN: no rash    : CVA negative,     Lab:                       7.0    4.62  )-----------( 130      ( 23 May 2020 11:48 )             22.3       05-23    138  |  102  |  66<H>  ----------------------------<  104<H>  3.9   |  26  |  6.82<H>    Ca    8.4<L>      23 May 2020 11:48  Phos  4.5     05-23  Mg     2.2     05-23

## 2020-05-23 NOTE — ED ADULT NURSE NOTE - CHIEF COMPLAINT QUOTE
Pt sent from [facility] for dialysis, pt is covid +, last treatment thursday

## 2020-05-23 NOTE — ED ADULT NURSE REASSESSMENT NOTE - NS ED NURSE REASSESS COMMENT FT1
Pt resting in bed, no complaints at this time. Pt had ham sandwhich with diet cranberry juice. Pt denies any complaints at this moment.

## 2020-05-23 NOTE — ED PROVIDER NOTE - OBJECTIVE STATEMENT
86 y/o M presents for dialysis. Pt sent from [facility] for dialysis. Last dialysis on thursday. pt is covid +. Pt offers no complaints at this time. -Charli Mo PA-C

## 2020-05-23 NOTE — ED PROVIDER NOTE - CARE PLAN
Principal Discharge DX:	COVID-19  Secondary Diagnosis:	ESRD on dialysis Principal Discharge DX:	COVID-19  Secondary Diagnosis:	ESRD on dialysis  Secondary Diagnosis:	Anemia

## 2020-05-23 NOTE — CONSULT NOTE ADULT - ASSESSMENT
84 yo man with ESRD post recent hosp for GIB now positive for COVID 19.  Admitted due to significant drop in HGB again.  --ESRD : Hd today and continue TIW-TTS schedule.  --GIB/Anemia  : transfuse 2 units PRBC today.  Continue JARED.  --COVID 19 : supportive care. 84 yo man with ESRD post recent hosp for GIB now positive for COVID 19.  Admitted due to significant drop in HGB again.  --ESRD : Hd today and continue TIW-TTS schedule.  --GIB/Anemia  : transfuse 2 units PRBC today.  Continue JARED.  --COVID 19 : supportive care.    D/w ED MD and pt's son.

## 2020-05-23 NOTE — CONSULT NOTE ADULT - SUBJECTIVE AND OBJECTIVE BOX
Chief complaints.  Pt presents with 3 day hx of intermittent fever to 100.8.  Was evaluated by [facility] on 5/19--now COVID 19 positive.    HPI:  86 yo man with ESRD on maintenance HD TIW.  Post recent  admission for GIB.  Was covid 19 negative and had EGD revealing gastric AVM with Clipping.   Pt was d/jaiden home in stable condition on 5/15.  Developed low grade fever on 5/18 ( pt's daughter and son in law tested positive for COVID 19 and were isolating in basement), brought to [facility] on 5/19 but sent home after nasal swab for Covid-19 PCR.  Pt was dialyzed on 5/21 at [facility] [place] in isolation.    Today, pt's son called confirming positive COCID 19 PCR.  Pt sent to  ED due to symptoms and due to concern for transportation to dialysis.  D/w pt's son who reported he will be able to take care of pt and be responsible for transportation to HD.  Originally plan to d/c post HD today.    However, Hgb low again at 7.  Therefore will admit for GI follow up and to assure stability of HGB.    PMHX and PSHX.  1.Hx of DM  2.HTN  3.Post TAVR.  4.Hypothyroidism  5.BPH  6.Cutaneous T Cell Lymphoma  7.GIB ( Gastric AVM clipped 5/12/2020)    FAMILY HISTORY:  No pertinent family history in first degree relatives    SOCIAL HISTORY :  No hx of current smoking or ETOH.  Allergies    No Known Allergies    REVIEW OF SYSTEMS :   Denies SOB  Positive weakness.  Generalized body ache  Denies diarrhea  Denies nausea.    MEDICATIONS  (STANDING):    MEDICATIONS  (PRN):    Vital Signs Last 24 Hrs  T(C): 37 (23 May 2020 10:54), Max: 37 (23 May 2020 10:54)  T(F): 98.6 (23 May 2020 10:54), Max: 98.6 (23 May 2020 10:54)  HR: 76 (23 May 2020 10:54) (76 - 76)  BP: 119/61 (23 May 2020 10:54) (119/61 - 119/61)  BP(mean): 75 (23 May 2020 10:54) (75 - 75)  RR: 18 (23 May 2020 10:54) (18 - 18)  SpO2: 99% (23 May 2020 10:54) (99% - 99%)  Daily Height in cm: 165.1 (23 May 2020 10:45)      PHYSICAL EXAM: Alert and responsive  GEN: No apparent distress  HEENT: WNL  NECK : supple  CV: S1S2 RRR  LUNGS: fair air entry  ABD: soft  EXT: 1-2 + edema    LABS:                        7.0    4.62  )-----------( 130      ( 23 May 2020 11:48 )             22.3     05-23    138  |  102  |  66<H>  ----------------------------<  104<H>  3.9   |  26  |  6.82<H>    Ca    8.4<L>      23 May 2020 11:48  Phos  4.5     05-23  Mg     2.2     05-23          Magnesium, Serum: 2.2 mg/dL (05-23 @ 11:48)  Phosphorus Level, Serum: 4.5 mg/dL (05-23 @ 11:48)

## 2020-05-23 NOTE — ED PROVIDER NOTE - PROGRESS NOTE DETAILS
Spoke with gabby, pt was sent because they do not do dialysis on the weekend and pt is covid +. Will be determined by Dr. Santo if pt may return to facility. -Charli Mo PA-C Spoke with Dr. Santo patient will require admission as family is covid +, Has nobody to care for pt. -Charli Mo PA-C covid + 5/20/20 outpt at [facility]. pt tabitha nd eval with PA. sent [facility] for need for HD and outt +covid swab. ptis well appearing with nl vs. no acute findings on PE. d/w nephro dr. palmer for HD today. admit to hospital. MD KADY Dr. Santo spoke with pts family, would prefer pt not to stay in hospital. Pt to get dialysis and dc back to facility. -Charli Mo PA-C pt with hgb 7.0, was just in HH for gibleed with gastric avm cauterized. d/w  dr Sugar palmer. will tx 2 units prbcs during HD. papo admit to hospital for gi bleed, gi consult and monitoring. MD KADY pt reports black stool . on rectal exam it is black and guaic positive. MD KADY

## 2020-05-23 NOTE — H&P ADULT - ASSESSMENT
84 yo male with Hx. of end stage CKD on hemodialysis and recent GI bleed, who was sent to the ED from [facility] because of low grade temp and tested positive for COVID 19. He had EGD for anemia  and found with AVM which was clipped on 5/15/20.  The patient is c/o of black stool and his Hg is 7. The patient was referred for admission to r/o GI bleed. Started on blood transfusion via dialysis.  assessment Dx:                       1. GI bleed                       2. Anemia sec. to blood loss                        3. COVID 19 infection                       4. ESKD                       5. DM    Plan:    admit to medicine               medications as per med rec.               VTEP: venodynes               Labs: cbc.bmp               Radiology: CXRay                Cardiac diagnostics: EKG                Consults: Nephrology, GI. , ID 84 yo male with Hx. of end stage CKD on hemodialysis and recent GI bleed, who was sent to the ED from [facility] because of low grade temp and tested positive for COVID 19. He had EGD for anemia  and found with AVM which was clipped on 5/15/20.  The patient is c/o of black stool, tested heme positive in ED and his Hg is 7. The patient was referred for admission  for  GI bleed. Started on blood transfusion via dialysis.  assessment Dx:                       1. GI bleed                       2. Anemia sec. to blood loss                        3. COVID 19 infection                       4. ESKD                       5. DM    Plan:    admit to medicine               medications as per med rec.  Rx. Protonix               VTEP: venodynes               Labs: cbc.bmp               Radiology: CXRay                Cardiac diagnostics: EKG                Consults: Nephrology, GI. , ID 86 yo male with Hx. of end stage CKD on hemodialysis and recent GI bleed, who was sent to the ED from [facility] because of low grade temp and tested positive for COVID 19. He had EGD for anemia  and found with AVM which was clipped on 5/15/20.  The patient is c/o of black stool, tested heme positive in ED and his Hg is 7. The patient was referred for admission  for  GI bleed. Started on blood transfusion via dialysis.  assessment Dx:                       1. GI bleed                       2. Anemia sec. to blood loss                        3. COVID 19 infection                       4. ESKD                       5. DM    Plan:    admit to medicine               medications as per med rec.  Rx. Protonix, Hold Eliquis,                VTEP: venodynes               Labs: cbc.bmp               Radiology: CXRay                Cardiac diagnostics: EKG                Consults: Nephrology, GI. , ID

## 2020-05-23 NOTE — PHARMACOTHERAPY INTERVENTION NOTE - COMMENTS
Med history completed, verified with med list faxed over by [facility]. Med list does not have pantoprazole but has famotidine; previous admission record showed discharge Rx for pantoprazole, need follow up.

## 2020-05-24 LAB
BASOPHILS # BLD AUTO: 0.01 K/UL — SIGNIFICANT CHANGE UP (ref 0–0.2)
BASOPHILS NFR BLD AUTO: 0.2 % — SIGNIFICANT CHANGE UP (ref 0–2)
EOSINOPHIL # BLD AUTO: 0.02 K/UL — SIGNIFICANT CHANGE UP (ref 0–0.5)
EOSINOPHIL NFR BLD AUTO: 0.4 % — SIGNIFICANT CHANGE UP (ref 0–6)
HCT VFR BLD CALC: 28.4 % — LOW (ref 39–50)
HGB BLD-MCNC: 9.2 G/DL — LOW (ref 13–17)
IMM GRANULOCYTES NFR BLD AUTO: 0.7 % — SIGNIFICANT CHANGE UP (ref 0–1.5)
LYMPHOCYTES # BLD AUTO: 0.76 K/UL — LOW (ref 1–3.3)
LYMPHOCYTES # BLD AUTO: 14 % — SIGNIFICANT CHANGE UP (ref 13–44)
MCHC RBC-ENTMCNC: 30.4 PG — SIGNIFICANT CHANGE UP (ref 27–34)
MCHC RBC-ENTMCNC: 32.4 GM/DL — SIGNIFICANT CHANGE UP (ref 32–36)
MCV RBC AUTO: 93.7 FL — SIGNIFICANT CHANGE UP (ref 80–100)
MONOCYTES # BLD AUTO: 0.42 K/UL — SIGNIFICANT CHANGE UP (ref 0–0.9)
MONOCYTES NFR BLD AUTO: 7.8 % — SIGNIFICANT CHANGE UP (ref 2–14)
NEUTROPHILS # BLD AUTO: 4.16 K/UL — SIGNIFICANT CHANGE UP (ref 1.8–7.4)
NEUTROPHILS NFR BLD AUTO: 76.9 % — SIGNIFICANT CHANGE UP (ref 43–77)
PLATELET # BLD AUTO: 125 K/UL — LOW (ref 150–400)
RBC # BLD: 3.03 M/UL — LOW (ref 4.2–5.8)
RBC # FLD: 20.1 % — HIGH (ref 10.3–14.5)
WBC # BLD: 5.41 K/UL — SIGNIFICANT CHANGE UP (ref 3.8–10.5)
WBC # FLD AUTO: 5.41 K/UL — SIGNIFICANT CHANGE UP (ref 3.8–10.5)

## 2020-05-24 PROCEDURE — 99223 1ST HOSP IP/OBS HIGH 75: CPT

## 2020-05-24 PROCEDURE — 99232 SBSQ HOSP IP/OBS MODERATE 35: CPT

## 2020-05-24 RX ADMIN — Medication 75 MICROGRAM(S): at 05:03

## 2020-05-24 RX ADMIN — PANTOPRAZOLE SODIUM 40 MILLIGRAM(S): 20 TABLET, DELAYED RELEASE ORAL at 05:03

## 2020-05-24 RX ADMIN — Medication 25 MILLIGRAM(S): at 17:43

## 2020-05-24 RX ADMIN — SEVELAMER CARBONATE 800 MILLIGRAM(S): 2400 POWDER, FOR SUSPENSION ORAL at 05:04

## 2020-05-24 RX ADMIN — TAMSULOSIN HYDROCHLORIDE 0.4 MILLIGRAM(S): 0.4 CAPSULE ORAL at 20:17

## 2020-05-24 RX ADMIN — SEVELAMER CARBONATE 800 MILLIGRAM(S): 2400 POWDER, FOR SUSPENSION ORAL at 14:08

## 2020-05-24 RX ADMIN — FAMOTIDINE 20 MILLIGRAM(S): 10 INJECTION INTRAVENOUS at 11:36

## 2020-05-24 RX ADMIN — Medication 1: at 11:36

## 2020-05-24 RX ADMIN — Medication 25 MILLIGRAM(S): at 05:03

## 2020-05-24 RX ADMIN — SEVELAMER CARBONATE 800 MILLIGRAM(S): 2400 POWDER, FOR SUSPENSION ORAL at 20:17

## 2020-05-24 RX ADMIN — Medication 5 MILLIGRAM(S): at 20:17

## 2020-05-24 RX ADMIN — ERYTHROPOIETIN 20000 UNIT(S): 10000 INJECTION, SOLUTION INTRAVENOUS; SUBCUTANEOUS at 17:42

## 2020-05-24 NOTE — PROGRESS NOTE ADULT - SUBJECTIVE AND OBJECTIVE BOX
CC: Anemia History of Present Illness: 	  HPI: The patient is an 84 yo male with Hx. of end stage CKD on hemodialysis and recent GI bleed, who was sent to the ED from [facility] dialysis because of low grade temp and tested positive for COVID 19. He had EGD for anemia  and found with AVM which was clipped. He was discharged home on  5/15/20.   On 5/18/20 he developed low grade fever. His daughter and son in low tested positive for COVID 19 and were self  isolating in basement. He tested tested positive for COVID 19 on 519/20. He was dialysed on 5/21/20 at  [facility] in isolation. He had nephrology consult  in the ED. The patient was found with low Hg. of 7 and was also complaining of black stool and tested heme positive in the ED. Referred for admission for GI bleed.    5/24: Sitting in chair, comfortable, having black stool.  No fever, chills, HD stable.    REVIEW OF SYSTEMS: All other review of systems is negative unless indicated above.    Vital Signs Last 24 Hrs  T(C): 37 (24 May 2020 10:39), Max: 37.3 (24 May 2020 00:59)  T(F): 98.6 (24 May 2020 10:39), Max: 99.2 (24 May 2020 00:59)  HR: 85 (24 May 2020 10:39) (62 - 90)  BP: 149/57 (24 May 2020 10:39) (119/68 - 150/79)  BP(mean): --  RR: 17 (24 May 2020 10:39) (17 - 20)  SpO2: 100% (24 May 2020 10:39) (97% - 100%)    PHYSICAL EXAM:    Constitutional: NAD, awake and alert, well-developed  HEENT: PERR, EOMI, Normal Hearing, MMM  Neck: Soft and supple  Respiratory: Breath sounds are clear bilaterally, No wheezing, rales or rhonchi  Cardiovascular: S1 and S2, regular rate and rhythm, no Murmurs, gallops or rubs  Gastrointestinal: Bowel Sounds present, soft, nontender, nondistended, no guarding, no rebound  Extremities: No peripheral edema  Neurological: A/O x 3, no focal deficits in my limited exam    MEDICATIONS  (STANDING):  dextrose 5%. 1000 milliLiter(s) (50 mL/Hr) IV Continuous <Continuous>  dextrose 50% Injectable 12.5 Gram(s) IV Push once  dextrose 50% Injectable 25 Gram(s) IV Push once  dextrose 50% Injectable 25 Gram(s) IV Push once  epoetin-shakila-epbx (RETACRIT) Injectable 59304 Unit(s) SubCutaneous every 7 days  famotidine    Tablet 20 milliGRAM(s) Oral daily  insulin lispro (HumaLOG) corrective regimen sliding scale   SubCutaneous three times a day before meals  levothyroxine 75 MICROGram(s) Oral daily  melatonin 5 milliGRAM(s) Oral at bedtime  metoprolol tartrate 25 milliGRAM(s) Oral two times a day  pantoprazole    Tablet 40 milliGRAM(s) Oral before breakfast  sevelamer carbonate 800 milliGRAM(s) Oral three times a day  tamsulosin 0.4 milliGRAM(s) Oral at bedtime    MEDICATIONS  (PRN):  dextrose 40% Gel 15 Gram(s) Oral once PRN Blood Glucose LESS THAN 70 milliGRAM(s)/deciliter  glucagon  Injectable 1 milliGRAM(s) IntraMuscular once PRN Glucose LESS THAN 70 milligrams/deciliter                              9.2    5.41  )-----------( 125      ( 24 May 2020 06:44 )             28.4     05-23    138  |  102  |  66<H>  ----------------------------<  104<H>  3.9   |  26  |  6.82<H>    Ca    8.4<L>      23 May 2020 11:48  Phos  4.5     05-23  Mg     2.2     05-23      CAPILLARY BLOOD GLUCOSE      POCT Blood Glucose.: 158 mg/dL (24 May 2020 11:33)  POCT Blood Glucose.: 96 mg/dL (24 May 2020 05:18)  POCT Blood Glucose.: 89 mg/dL (23 May 2020 22:44)        Assessment and Plan:  84 yo male with Hx. of end stage CKD on hemodialysis and recent GI bleed, who was sent to the ED from [facility] because of low grade temp and tested positive for COVID 19. Found to have black stool concerning for acute GI bleed.    Acute blood loss anemia / GI bleed/ concerning recurrent AVM bleed:  -s/p 1u PRBC  -Monitor H+H, 7 --> 9.2  -protonix/pepcid  -hold aspirin and eliquis  -hold miralax  -GI evaluated --> possible enteroscopy Tuesday    COVID 19 infection: Asymptomatic  -chest xray no significant findings, slight left effusion  -Monitor clinically    ESRD:  -c/w HD  -epo    Chronic afib:  -hold aspirin and eliquis in setting of GI bleed  -cardio eval due to recurrent GI bleed for afib anticoagulation prophylaxis     DM2:  -RAISS  -EPO      VTEP:   -venodynes

## 2020-05-24 NOTE — CONSULT NOTE ADULT - ASSESSMENT
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

## 2020-05-25 DIAGNOSIS — I48.0 PAROXYSMAL ATRIAL FIBRILLATION: ICD-10-CM

## 2020-05-25 DIAGNOSIS — K92.2 GASTROINTESTINAL HEMORRHAGE, UNSPECIFIED: ICD-10-CM

## 2020-05-25 LAB
ANION GAP SERPL CALC-SCNC: 12 MMOL/L — SIGNIFICANT CHANGE UP (ref 5–17)
BUN SERPL-MCNC: 62 MG/DL — HIGH (ref 7–23)
CALCIUM SERPL-MCNC: 8.4 MG/DL — LOW (ref 8.5–10.1)
CHLORIDE SERPL-SCNC: 101 MMOL/L — SIGNIFICANT CHANGE UP (ref 96–108)
CO2 SERPL-SCNC: 24 MMOL/L — SIGNIFICANT CHANGE UP (ref 22–31)
CREAT SERPL-MCNC: 6 MG/DL — HIGH (ref 0.5–1.3)
GLUCOSE SERPL-MCNC: 100 MG/DL — HIGH (ref 70–99)
HCT VFR BLD CALC: 28.7 % — LOW (ref 39–50)
HGB BLD-MCNC: 9.4 G/DL — LOW (ref 13–17)
MCHC RBC-ENTMCNC: 30.7 PG — SIGNIFICANT CHANGE UP (ref 27–34)
MCHC RBC-ENTMCNC: 32.8 GM/DL — SIGNIFICANT CHANGE UP (ref 32–36)
MCV RBC AUTO: 93.8 FL — SIGNIFICANT CHANGE UP (ref 80–100)
PLATELET # BLD AUTO: 138 K/UL — LOW (ref 150–400)
POTASSIUM SERPL-MCNC: 3.8 MMOL/L — SIGNIFICANT CHANGE UP (ref 3.5–5.3)
POTASSIUM SERPL-SCNC: 3.8 MMOL/L — SIGNIFICANT CHANGE UP (ref 3.5–5.3)
RBC # BLD: 3.06 M/UL — LOW (ref 4.2–5.8)
RBC # FLD: 19.6 % — HIGH (ref 10.3–14.5)
SODIUM SERPL-SCNC: 137 MMOL/L — SIGNIFICANT CHANGE UP (ref 135–145)
WBC # BLD: 6.25 K/UL — SIGNIFICANT CHANGE UP (ref 3.8–10.5)
WBC # FLD AUTO: 6.25 K/UL — SIGNIFICANT CHANGE UP (ref 3.8–10.5)

## 2020-05-25 PROCEDURE — 99222 1ST HOSP IP/OBS MODERATE 55: CPT

## 2020-05-25 PROCEDURE — 99232 SBSQ HOSP IP/OBS MODERATE 35: CPT

## 2020-05-25 RX ORDER — OXYCODONE AND ACETAMINOPHEN 5; 325 MG/1; MG/1
1 TABLET ORAL EVERY 6 HOURS
Refills: 0 | Status: DISCONTINUED | OUTPATIENT
Start: 2020-05-25 | End: 2020-06-01

## 2020-05-25 RX ADMIN — FAMOTIDINE 20 MILLIGRAM(S): 10 INJECTION INTRAVENOUS at 12:03

## 2020-05-25 RX ADMIN — SEVELAMER CARBONATE 800 MILLIGRAM(S): 2400 POWDER, FOR SUSPENSION ORAL at 04:52

## 2020-05-25 RX ADMIN — OXYCODONE AND ACETAMINOPHEN 1 TABLET(S): 5; 325 TABLET ORAL at 17:02

## 2020-05-25 RX ADMIN — PANTOPRAZOLE SODIUM 40 MILLIGRAM(S): 20 TABLET, DELAYED RELEASE ORAL at 04:51

## 2020-05-25 RX ADMIN — Medication 25 MILLIGRAM(S): at 04:51

## 2020-05-25 RX ADMIN — Medication 25 MILLIGRAM(S): at 16:56

## 2020-05-25 RX ADMIN — Medication 5 MILLIGRAM(S): at 20:49

## 2020-05-25 RX ADMIN — SEVELAMER CARBONATE 800 MILLIGRAM(S): 2400 POWDER, FOR SUSPENSION ORAL at 20:49

## 2020-05-25 RX ADMIN — Medication 75 MICROGRAM(S): at 04:51

## 2020-05-25 RX ADMIN — TAMSULOSIN HYDROCHLORIDE 0.4 MILLIGRAM(S): 0.4 CAPSULE ORAL at 20:49

## 2020-05-25 RX ADMIN — SEVELAMER CARBONATE 800 MILLIGRAM(S): 2400 POWDER, FOR SUSPENSION ORAL at 12:03

## 2020-05-25 NOTE — DIETITIAN INITIAL EVALUATION ADULT. - PHYSICAL APPEARANCE
other (specify) Unable to perform NFPE at this time 2/2 isolation protocol   Loyd 20  no noted skin breakdown or edema

## 2020-05-25 NOTE — DIETITIAN INITIAL EVALUATION ADULT. - PERTINENT MEDS FT
MEDICATIONS  (STANDING):  epoetin-shakila-epbx (RETACRIT) Injectable 80287 Unit(s) SubCutaneous every 7 days  famotidine    Tablet 20 milliGRAM(s) Oral daily  levothyroxine 75 MICROGram(s) Oral daily  melatonin 5 milliGRAM(s) Oral at bedtime  metoprolol tartrate 25 milliGRAM(s) Oral two times a day  pantoprazole    Tablet 40 milliGRAM(s) Oral before breakfast  sevelamer carbonate 800 milliGRAM(s) Oral three times a day  tamsulosin 0.4 milliGRAM(s) Oral at bedtime    MEDICATIONS  (PRN):

## 2020-05-25 NOTE — CONSULT NOTE ADULT - PROBLEM SELECTOR RECOMMENDATION 9
Given recurrent GI bleeding in patient on Eliquis would stop completely at this point and then once recovered reassess for Watchman device as with AVM's likelihood of him continuing to have this issue is high but his risk is high as well so does need some protection.  This option seems most optimal.

## 2020-05-25 NOTE — PROGRESS NOTE ADULT - ASSESSMENT
86yo male COVID positive with recurrent GI bleed on apixaban  He has less melena and h/h improved after transfusion at dialysis  pantoprazole continue    plan for enteroscopy tuesday with dr watson pending clinical course  serial h/h and transfuse as needed  hold apixaban  ok for po intake today unless evidence of increased bleeding  made NPO after MN  -d/w pt

## 2020-05-25 NOTE — DIETITIAN INITIAL EVALUATION ADULT. - PERTINENT LABORATORY DATA
05-25 Na137 mmol/L Glu 100 mg/dL<H> K+ 3.8 mmol/L Cr  6.00 mg/dL<H> BUN 62 mg/dL<H> Phos n/a   Alb n/a   PAB n/a  CAPILLARY BLOOD GLUCOSE      POCT Blood Glucose.: 124 mg/dL (25 May 2020 12:00)  POCT Blood Glucose.: 112 mg/dL (25 May 2020 07:35)  POCT Blood Glucose.: 119 mg/dL (24 May 2020 20:20)  POCT Blood Glucose.: 135 mg/dL (24 May 2020 17:33)

## 2020-05-25 NOTE — DIETITIAN INITIAL EVALUATION ADULT. - NS FNS WEIGHT CHANGE REASON
wt appears to be stable over the past 4 mths; minimal wt loss 1.1% during this time possibly r/t HD and fluid status.

## 2020-05-25 NOTE — PROGRESS NOTE ADULT - SUBJECTIVE AND OBJECTIVE BOX
CC: Anemia History of Present Illness: 	  HPI: The patient is an 86 yo male with Hx. of end stage CKD on hemodialysis and recent GI bleed, who was sent to the ED from [facility] dialysis because of low grade temp and tested positive for COVID 19. He had EGD for anemia  and found with AVM which was clipped. He was discharged home on  5/15/20.   On 5/18/20 he developed low grade fever. His daughter and son in low tested positive for COVID 19 and were self  isolating in basement. He tested tested positive for COVID 19 on 519/20. He was dialysed on 5/21/20 at  [facility] in isolation. He had nephrology consult  in the ED. The patient was found with low Hg. of 7 and was also complaining of black stool and tested heme positive in the ED. Referred for admission for GI bleed.    5/24: Sitting in chair, comfortable, having black stool.  No fever, chills, HD stable.  5/25: Sitting in chair, HD stable, awaiting GI intervention.    REVIEW OF SYSTEMS: All other review of systems is negative unless indicated above.    Vital Signs Last 24 Hrs  T(C): 37.7 (25 May 2020 11:14), Max: 37.7 (25 May 2020 11:14)  T(F): 99.8 (25 May 2020 11:14), Max: 99.8 (25 May 2020 11:14)  HR: 87 (25 May 2020 11:14) (87 - 98)  BP: 130/62 (25 May 2020 11:14) (130/62 - 147/84)  BP(mean): --  RR: 18 (25 May 2020 11:14) (18 - 18)  SpO2: 99% (25 May 2020 11:14) (96% - 99%)    PHYSICAL EXAM:    Constitutional: NAD, awake and alert, well-developed  HEENT: PERR, EOMI, Normal Hearing, MMM  Neck: Soft and supple  Respiratory: Breath sounds are clear bilaterally, No wheezing, rales or rhonchi  Cardiovascular: S1 and S2, regular rate and rhythm, no Murmurs, gallops or rubs  Gastrointestinal: Bowel Sounds present, soft, nontender, nondistended, no guarding, no rebound  Extremities: No peripheral edema  Neurological: A/O x 3, no focal deficits in my limited exam    MEDICATIONS  (STANDING):  dextrose 5%. 1000 milliLiter(s) (50 mL/Hr) IV Continuous <Continuous>  dextrose 50% Injectable 12.5 Gram(s) IV Push once  dextrose 50% Injectable 25 Gram(s) IV Push once  dextrose 50% Injectable 25 Gram(s) IV Push once  epoetin-shakila-epbx (RETACRIT) Injectable 12886 Unit(s) SubCutaneous every 7 days  famotidine    Tablet 20 milliGRAM(s) Oral daily  insulin lispro (HumaLOG) corrective regimen sliding scale   SubCutaneous three times a day before meals  levothyroxine 75 MICROGram(s) Oral daily  melatonin 5 milliGRAM(s) Oral at bedtime  metoprolol tartrate 25 milliGRAM(s) Oral two times a day  pantoprazole    Tablet 40 milliGRAM(s) Oral before breakfast  sevelamer carbonate 800 milliGRAM(s) Oral three times a day  tamsulosin 0.4 milliGRAM(s) Oral at bedtime    MEDICATIONS  (PRN):  dextrose 40% Gel 15 Gram(s) Oral once PRN Blood Glucose LESS THAN 70 milliGRAM(s)/deciliter  glucagon  Injectable 1 milliGRAM(s) IntraMuscular once PRN Glucose LESS THAN 70 milligrams/deciliter                              9.4    6.25  )-----------( 138      ( 25 May 2020 07:24 )             28.7     05-25    137  |  101  |  62<H>  ----------------------------<  100<H>  3.8   |  24  |  6.00<H>    Ca    8.4<L>      25 May 2020 07:24      CAPILLARY BLOOD GLUCOSE      POCT Blood Glucose.: 124 mg/dL (25 May 2020 12:00)  POCT Blood Glucose.: 112 mg/dL (25 May 2020 07:35)  POCT Blood Glucose.: 119 mg/dL (24 May 2020 20:20)  POCT Blood Glucose.: 135 mg/dL (24 May 2020 17:33)        Assessment and Plan:  86 yo male with Hx. of end stage CKD on hemodialysis and recent GI bleed, who was sent to the ED from [facility] because of low grade temp and tested positive for COVID 19. Found to have black stool concerning for acute GI bleed.    Acute blood loss anemia / GI bleed / concerning recurrent AVM bleed:  -s/p 1u PRBC  -Monitor H+H, 7 --> 9.2 -->9.4  -protonix/pepcid  -hold aspirin and eliquis  -hold miralax  -GI evaluated --> possible enteroscopy Tuesday    COVID 19 infection: Asymptomatic  -chest xray no significant findings, slight left effusion  -Monitor clinically    ESRD:  -c/w HD  -epo    Chronic afib:  -hold aspirin and eliquis in setting of GI bleed  -cardio eval due to recurrent GI bleed for afib anticoagulation prophylaxis     DM2:  -A1c 5.2  -stop RAISS  -FS testing BID  -EPO      VTEP:   -venodynes    Dispo:  -monitor for bleeding  -await GI recs

## 2020-05-25 NOTE — CONSULT NOTE ADULT - SUBJECTIVE AND OBJECTIVE BOX
HPI: The patient is an 84 yo male with Hx. of end stage CKD on hemodialysis and recent GI bleed, PAF on AC (Eliquis) who was sent to the ED from [facility] dialysis because of low grade temp and tested positive for COVID 19. He had EGD for anemia  and found with AVM which was clipped. He was discharged home on  5/15/20. On 20 he developed low grade fever. His daughter and son in low tested positive for COVID 19 and were self  isolating in basement. He tested tested positive for COVID 19 on . He was dialysed on 20 at  [facility] in isolation. He had nephrology consult  in the ED. The patient was found with low Hg. of 7 and was also complaining of black stool and tested heme positive in the ED. Referred for admission for GI bleed.  Cardiology consulted now regarding anticoagulation.  Has moderate AS on last echo in 2019 (Dr. Katz's office in [place]). ? [place]  Lexiscan Nuclear stress test hakeem 2018 and negative for ischemia (Done for new LBBB).    PAST MEDICAL & SURGICAL HISTORY:  BPH (benign prostatic hyperplasia)  Cutaneous T-cell lymphoma  Hypothyroid  Hypercholesteremia  DM (diabetes mellitus)  HTN (hypertension)  S/P TAVR (transcatheter aortic valve replacement)  H/O hernia repair  ESRD on dialysis m/w/f  HTN  DM2  Hypothyroid  cutaneous T Cell lymphoma  AS  SANG  SSCA  TIA  Gout  BPH  TAVR  hernia repair.   PAF    SOCIAL HISTORY: Non-Smoker/Social ETOH/ No Ilicit Drug use.    FAMILY HISTORY:  Father  of skin CA  Mother  of MI,     Allergies  No Known Allergies    REVIEW OF SYSTEMS: 13 systems were reviewed and all negative except for comments above.    Home Medications:  apixaban 2.5 mg oral tablet: 1 tab(s) orally 2 times a day (23 May 2020 21:44)  cholecalciferol oral tablet: 1000 unit(s) orally once a day (23 May 2020 21:44)  docusate sodium 100 mg oral capsule: 2 cap(s) orally once a day, As Needed - for congestion (23 May 2020 21:44)  famotidine 20 mg oral tablet: 1 tab(s) orally once a day (at bedtime) (23 May 2020 21:44)  meclizine 12.5 mg oral tablet: 1 tab(s) orally once a day (23 May 2020 21:44)  Melatonin 5 mg oral tablet: 1 tab(s) orally once a day (at bedtime) (23 May 2020 21:44)  Metoprolol Tartrate 25 mg oral tablet: 1 tab(s) orally 2 times a day (23 May 2020 21:44)  Nitrostat 0.4 mg sublingual tablet: 1 tab(s) sublingual every 5 minutes as directed - as needed for chest pain (23 May 2020 21:44)  sevelamer hydrochloride 800 mg oral tablet: 2 tab(s) orally 3 times a day (with meals) (23 May 2020 21:44)  triamcinolone 0.1% topical cream: Apply topically to affected area  (23 May 2020 21:44)      MEDICATIONS  (STANDING):  epoetin-shakila-epbx (RETACRIT) Injectable 86973 Unit(s) SubCutaneous every 7 days  famotidine    Tablet 20 milliGRAM(s) Oral daily  levothyroxine 75 MICROGram(s) Oral daily  melatonin 5 milliGRAM(s) Oral at bedtime  metoprolol tartrate 25 milliGRAM(s) Oral two times a day  pantoprazole    Tablet 40 milliGRAM(s) Oral before breakfast  sevelamer carbonate 800 milliGRAM(s) Oral three times a day  tamsulosin 0.4 milliGRAM(s) Oral at bedtime    MEDICATIONS  (PRN):      Vital Signs Last 24 Hrs  T(C): 37.7 (25 May 2020 11:14), Max: 37.7 (25 May 2020 11:14)  T(F): 99.8 (25 May 2020 11:14), Max: 99.8 (25 May 2020 11:14)  HR: 87 (25 May 2020 11:14) (87 - 98)  BP: 130/62 (25 May 2020 11:14) (130/62 - 147/84)  BP(mean): --  RR: 18 (25 May 2020 11:14) (18 - 18)  SpO2: 99% (25 May 2020 11:14) (96% - 99%)    I&O's Detail      Daily     Daily                           9.4    6.25  )-----------( 138      ( 25 May 2020 07:24 )             28.7     05-25    137  |  101  |  62<H>  ----------------------------<  100<H>  3.8   |  24  |  6.00<H>    Ca    8.4<L>      25 May 2020 07:24      EKG:  < from: 12 Lead ECG (20 @ 16:07) >   Atrial fibrillation  Left bundle branch block  Abnormal ECG    < end of copied text >    ECHO:  2019 Echo NL LV FX, LVH, Moderate AS, mild to moderate MR    STRESS TEST: 2018 negative.

## 2020-05-25 NOTE — PROGRESS NOTE ADULT - ASSESSMENT
84 yo man with ESRD post recent hosp for GIB now positive for COVID 19.  Admitted due to significant drop in HGB again.  --ESRD : Hd today and continue TIW-TTS schedule.  --GIB/Anemia  : transfuse 2 units PRBC today.  Continue JARED.  --COVID 19 : supportive care.    D/w ED MD and pt's son.    5/25 SY  --ESRD : continue TIW HD TTS.  --GIB : GI follow up appreciated.  Awaiting intervention. Possible enteroscopy  --A FIB : a/c on hold due to recurrent GIB.  --COVID 19 : diffuse body ache.   Start pain meds.

## 2020-05-25 NOTE — DIETITIAN INITIAL EVALUATION ADULT. - OTHER INFO
The patient is an 86 yo male with Hx. of end stage CKD on hemodialysis and recent GI bleed, who was sent to the ED from [facility] because of low grade temp and tested positive for COVID 19. He had EGD for anemia  and found with AVM which was clipped. He was discharged home on  5/15/20.   On 5/18/20 he developed low grade fever. His daughter and son in low tested positive for COVID 19 and were self  isolating in basement. He tested tested positive for COVID 19 on 519/20. He was dialysed on 5/21/20 at  [facility] in isolation. He had nephrology consult  in the ED. The patient was found with low Hg. of 7 and was also complaining of black stool and tested heme positive in the ED. Referred for admission for GI bleed.    Unable to visit pt at this time 2/2 isolation protocol. Pt admitted to  2/2 possible GI bleed; s/p GI consult noting plan for EGD tuesday; bleed possibly r/t AVM in small bowel. H/H improving s/p blood transfusion. Pt currently on DASH diet; K and phos appear to be WNL. Noted renvela w/ meals. A1c 5/2%. The patient is an 84 yo male with Hx. of end stage CKD on hemodialysis and recent GI bleed, who was sent to the ED from [facility] because of low grade temp and tested positive for COVID 19. He had EGD for anemia  and found with AVM which was clipped. He was discharged home on  5/15/20.   On 5/18/20 he developed low grade fever. His daughter and son in low tested positive for COVID 19 and were self  isolating in basement. He tested tested positive for COVID 19 on 519/20. He was dialysed on 5/21/20 at  [facility] in isolation. He had nephrology consult  in the ED. The patient was found with low Hg. of 7 and was also complaining of black stool and tested heme positive in the ED. Referred for admission for GI bleed.    Unable to visit pt at this time 2/2 isolation protocol. Pt admitted to  2/2 possible GI bleed; s/p GI consult noting plan for EGD tuesday; bleed possibly r/t AVM in small bowel. H/H improving s/p blood transfusion. Pt currently on DASH diet; K and phos appear to be WNL. Noted renvela w/ meals. A1c 5/2%. Attempted to contact pt however pt unavailable. Spoke w/ pts HCP daughter who reports pt normally has a good appetite w/ no recent significant wt changes. Daughter reports that she provides all meals for pt and provides appropriate foods for renal diet restriction. Daughter w/ no questions at this time regarding renal diet. As per daughter pt drinks ensure at home at times. Dtr denies chewing/swallowing difficulties. No reported food allergies.

## 2020-05-25 NOTE — DIETITIAN INITIAL EVALUATION ADULT. - ADD RECOMMEND
1. change diet to renal restrictions and consistent cho diet 2. add nepro 1x/day 3. monitor BMP, phos daily 4. daily wt 5. add nephrovite daily.

## 2020-05-25 NOTE — PROGRESS NOTE ADULT - SUBJECTIVE AND OBJECTIVE BOX
GI  coverage for Dr. Fonseca    Patient is a 85y old  Male who presents with a chief complaint of Anemia, COVID 19 infection (23 May 2020 20:31)      HPI:  no events  no overt bleeding   denies abd pain  shawn PO  no abd pain  no vomiting    REVIEW OF SYSTEMS:    CONSTITUTIONAL: weakness  EYES/ENT: No visual changes;  No vertigo or throat pain   NECK: No pain or stiffness  RESPIRATORY: No cough, wheezing, hemoptysis; No shortness of breath  CARDIOVASCULAR: No chest pain or palpitations  GASTROINTESTINAL: as above  GENITOURINARY: No dysuria, frequency or hematuria  NEUROLOGICAL: No numbness or weakness  SKIN: No itching, burning, rashes, or lesions   PSYCH: Normal mood and affect  All other review of systems is negative unless indicated above.    Vital Signs Last 24 Hrs  T(C): 37.7 (25 May 2020 11:14), Max: 37.7 (25 May 2020 11:14)  T(F): 99.8 (25 May 2020 11:14), Max: 99.8 (25 May 2020 11:14)  HR: 87 (25 May 2020 11:14) (87 - 98)  BP: 130/62 (25 May 2020 11:14) (130/62 - 147/84)  BP(mean): --  RR: 18 (25 May 2020 11:14) (18 - 18)  SpO2: 99% (25 May 2020 11:14) (96% - 99%)    PHYSICAL EXAM:    Constitutional: NAD  HEENT: MMM  Neck: No LAD  Respiratory: CTAB  Cardiovascular: S1 and S2, RRR, no M/G/R  Gastrointestinal: BS+, soft, NT/ND  Extremities: trace edema  Vascular: 1+ peripheral pulses  Neurological: A/O x 3  Psychiatric: Normal mood, normal affect  Skin: No rashes    LABS:             9.4    6.25  )-----------( 138      ( 25 May 2020 07:24 )             28.7

## 2020-05-25 NOTE — PROGRESS NOTE ADULT - SUBJECTIVE AND OBJECTIVE BOX
NEPHROLOGY INTERVAL HPI/OVERNIGHT EVENTS:    Date of Service: 20 @ 16:10   SY  Tolerated HD ok on .  Reports discomfort with generalized body ache.  Only limited po intake.    HPI:  84 yo man with ESRD on maintenance HD TIW.  Post recent  admission for GIB.  Was covid 19 negative and had EGD revealing gastric AVM with Clipping.   Pt was d/jaiden home in stable condition on 5/15.  Developed low grade fever on  ( pt's daughter and son in law tested positive for COVID 19 and were isolating in basement), brought to [facility] on  but sent home after nasal swab for Covid-19 PCR.  Pt was dialyzed on  at [facility] [place] in isolation.    Today, pt's son called confirming positive COCID 19 PCR.  Pt sent to  ED due to symptoms and due to concern for transportation to dialysis.  D/w pt's son who reported he will be able to take care of pt and be responsible for transportation to HD.  Originally plan to d/c post HD today.    However, Hgb low again at 7.  Therefore will admit for GI follow up and to assure stability of HGB.    PMHX and PSHX.  1.Hx of DM  2.HTN  3.Post TAVR.  4.Hypothyroidism  5.BPH  6.Cutaneous T Cell Lymphoma  7.GIB ( Gastric AVM clipped 2020)    MEDICATIONS  (STANDING):  epoetin-shakila-epbx (RETACRIT) Injectable 42206 Unit(s) SubCutaneous every 7 days  famotidine    Tablet 20 milliGRAM(s) Oral daily  levothyroxine 75 MICROGram(s) Oral daily  melatonin 5 milliGRAM(s) Oral at bedtime  metoprolol tartrate 25 milliGRAM(s) Oral two times a day  pantoprazole    Tablet 40 milliGRAM(s) Oral before breakfast  sevelamer carbonate 800 milliGRAM(s) Oral three times a day  tamsulosin 0.4 milliGRAM(s) Oral at bedtime    MEDICATIONS  (PRN):    Vital Signs Last 24 Hrs  T(C): 37.7 (25 May 2020 11:14), Max: 37.7 (25 May 2020 11:14)  T(F): 99.8 (25 May 2020 11:14), Max: 99.8 (25 May 2020 11:14)  HR: 87 (25 May 2020 11:14) (87 - 98)  BP: 130/62 (25 May 2020 11:14) (130/62 - 147/84)  BP(mean): --  RR: 18 (25 May 2020 11:14) (18 - 18)  SpO2: 99% (25 May 2020 11:14) (96% - 99%)  Daily     Daily Weight in k.4 (25 May 2020 15:04)    PHYSICAL EXAM: Alert and appropriate  GENERAL: No distress  CHEST/LUNG: fair air entry  HEART: S1S2 RRR  ABDOMEN: soft  EXTREMITIES: decreased edema  SKIN:     LABS:                        9.4    6.25  )-----------( 138      ( 25 May 2020 07:24 )             28.7     05-25    137  |  101  |  62<H>  ----------------------------<  100<H>  3.8   |  24  |  6.00<H>    Ca    8.4<L>      25 May 2020 07:24                  RADIOLOGY & ADDITIONAL TESTS:

## 2020-05-26 ENCOUNTER — RESULT REVIEW (OUTPATIENT)
Age: 85
End: 2020-05-26

## 2020-05-26 LAB
ANION GAP SERPL CALC-SCNC: 13 MMOL/L — SIGNIFICANT CHANGE UP (ref 5–17)
BUN SERPL-MCNC: 81 MG/DL — HIGH (ref 7–23)
CALCIUM SERPL-MCNC: 8.2 MG/DL — LOW (ref 8.5–10.1)
CHLORIDE SERPL-SCNC: 99 MMOL/L — SIGNIFICANT CHANGE UP (ref 96–108)
CO2 SERPL-SCNC: 22 MMOL/L — SIGNIFICANT CHANGE UP (ref 22–31)
CREAT SERPL-MCNC: 7.24 MG/DL — HIGH (ref 0.5–1.3)
GLUCOSE SERPL-MCNC: 125 MG/DL — HIGH (ref 70–99)
HCT VFR BLD CALC: 28.5 % — LOW (ref 39–50)
HGB BLD-MCNC: 9.2 G/DL — LOW (ref 13–17)
MCHC RBC-ENTMCNC: 30.2 PG — SIGNIFICANT CHANGE UP (ref 27–34)
MCHC RBC-ENTMCNC: 32.3 GM/DL — SIGNIFICANT CHANGE UP (ref 32–36)
MCV RBC AUTO: 93.4 FL — SIGNIFICANT CHANGE UP (ref 80–100)
PLATELET # BLD AUTO: 147 K/UL — LOW (ref 150–400)
POTASSIUM SERPL-MCNC: 3.4 MMOL/L — LOW (ref 3.5–5.3)
POTASSIUM SERPL-SCNC: 3.4 MMOL/L — LOW (ref 3.5–5.3)
RBC # BLD: 3.05 M/UL — LOW (ref 4.2–5.8)
RBC # FLD: 19.3 % — HIGH (ref 10.3–14.5)
SODIUM SERPL-SCNC: 134 MMOL/L — LOW (ref 135–145)
WBC # BLD: 8.15 K/UL — SIGNIFICANT CHANGE UP (ref 3.8–10.5)
WBC # FLD AUTO: 8.15 K/UL — SIGNIFICANT CHANGE UP (ref 3.8–10.5)

## 2020-05-26 PROCEDURE — 88312 SPECIAL STAINS GROUP 1: CPT | Mod: 26

## 2020-05-26 PROCEDURE — 88305 TISSUE EXAM BY PATHOLOGIST: CPT | Mod: 26

## 2020-05-26 PROCEDURE — 99232 SBSQ HOSP IP/OBS MODERATE 35: CPT

## 2020-05-26 RX ORDER — HEPARIN SODIUM 5000 [USP'U]/ML
1 INJECTION INTRAVENOUS; SUBCUTANEOUS ONCE
Refills: 0 | Status: COMPLETED | OUTPATIENT
Start: 2020-05-26 | End: 2020-05-26

## 2020-05-26 RX ORDER — ONDANSETRON 8 MG/1
4 TABLET, FILM COATED ORAL ONCE
Refills: 0 | Status: DISCONTINUED | OUTPATIENT
Start: 2020-05-26 | End: 2020-06-01

## 2020-05-26 RX ORDER — OXYCODONE HYDROCHLORIDE 5 MG/1
5 TABLET ORAL ONCE
Refills: 0 | Status: DISCONTINUED | OUTPATIENT
Start: 2020-05-26 | End: 2020-06-01

## 2020-05-26 RX ADMIN — SEVELAMER CARBONATE 800 MILLIGRAM(S): 2400 POWDER, FOR SUSPENSION ORAL at 05:24

## 2020-05-26 RX ADMIN — Medication 75 MICROGRAM(S): at 05:15

## 2020-05-26 RX ADMIN — HEPARIN SODIUM 1 DOSE(S): 5000 INJECTION INTRAVENOUS; SUBCUTANEOUS at 14:24

## 2020-05-26 RX ADMIN — Medication 25 MILLIGRAM(S): at 16:09

## 2020-05-26 RX ADMIN — FAMOTIDINE 20 MILLIGRAM(S): 10 INJECTION INTRAVENOUS at 16:06

## 2020-05-26 RX ADMIN — HEPARIN SODIUM 1 DOSE(S): 5000 INJECTION INTRAVENOUS; SUBCUTANEOUS at 14:25

## 2020-05-26 RX ADMIN — Medication 25 MILLIGRAM(S): at 05:24

## 2020-05-26 RX ADMIN — PANTOPRAZOLE SODIUM 40 MILLIGRAM(S): 20 TABLET, DELAYED RELEASE ORAL at 05:24

## 2020-05-26 RX ADMIN — SEVELAMER CARBONATE 800 MILLIGRAM(S): 2400 POWDER, FOR SUSPENSION ORAL at 16:08

## 2020-05-26 NOTE — PROGRESS NOTE ADULT - SUBJECTIVE AND OBJECTIVE BOX
CC: Anemia History of Present Illness: 	  HPI: The patient is an 84 yo male with Hx. of end stage CKD on hemodialysis and recent GI bleed, who was sent to the ED from [facility] dialysis because of low grade temp and tested positive for COVID 19. He had EGD for anemia  and found with AVM which was clipped. He was discharged home on  5/15/20.   On 5/18/20 he developed low grade fever. His daughter and son in low tested positive for COVID 19 and were self  isolating in basement. He tested tested positive for COVID 19 on 519/20. He was dialysed on 5/21/20 at  [facility] in isolation. He had nephrology consult  in the ED. The patient was found with low Hg. of 7 and was also complaining of black stool and tested heme positive in the ED. Referred for admission for GI bleed.    5/24: Sitting in chair, comfortable, having black stool.  No fever, chills, HD stable.  5/25: Sitting in chair, HD stable, awaiting GI intervention.  05/26/20: Patient seen and examined. Had HD and enteroscopy today. He denies any new complaints.     REVIEW OF SYSTEMS: All other review of systems is negative unless indicated above.    Vital Signs Last 24 Hrs  T(C): 37.1 (26 May 2020 14:27), Max: 37.7 (25 May 2020 20:30)  T(F): 98.8 (26 May 2020 14:27), Max: 99.8 (25 May 2020 20:30)  HR: 123 (26 May 2020 14:44) (90 - 123)  BP: 133/68 (26 May 2020 14:44) (122/70 - 174/90)  BP(mean): --  RR: 16 (26 May 2020 14:27) (16 - 21)  SpO2: 98% (26 May 2020 09:54) (95% - 98%)    PHYSICAL EXAM:    Constitutional: NAD, awake and alert, well-developed  HEENT: PERR, EOMI, Normal Hearing, MMM  Neck: Soft and supple  Respiratory: Breath sounds are clear bilaterally, No wheezing, rales or rhonchi  Cardiovascular: S1 and S2, regular rate and rhythm, no Murmurs, gallops or rubs  Gastrointestinal: Bowel Sounds present, soft, nontender, nondistended, no guarding, no rebound  Extremities: No peripheral edema  Neurological: A/O x 3, no focal deficits in my limited exam    MEDICATIONS  (STANDING):  dextrose 5%. 1000 milliLiter(s) (50 mL/Hr) IV Continuous <Continuous>  dextrose 50% Injectable 12.5 Gram(s) IV Push once  dextrose 50% Injectable 25 Gram(s) IV Push once  dextrose 50% Injectable 25 Gram(s) IV Push once  epoetin-shakila-epbx (RETACRIT) Injectable 59492 Unit(s) SubCutaneous every 7 days  famotidine    Tablet 20 milliGRAM(s) Oral daily  insulin lispro (HumaLOG) corrective regimen sliding scale   SubCutaneous three times a day before meals  levothyroxine 75 MICROGram(s) Oral daily  melatonin 5 milliGRAM(s) Oral at bedtime  metoprolol tartrate 25 milliGRAM(s) Oral two times a day  pantoprazole    Tablet 40 milliGRAM(s) Oral before breakfast  sevelamer carbonate 800 milliGRAM(s) Oral three times a day  tamsulosin 0.4 milliGRAM(s) Oral at bedtime    MEDICATIONS  (PRN):  dextrose 40% Gel 15 Gram(s) Oral once PRN Blood Glucose LESS THAN 70 milliGRAM(s)/deciliter  glucagon  Injectable 1 milliGRAM(s) IntraMuscular once PRN Glucose LESS THAN 70 milligrams/deciliter                                         9.2    8.15  )-----------( 147      ( 26 May 2020 10:30 )             28.5     26 May 2020 10:30    134    |  99     |  81     ----------------------------<  125    3.4     |  22     |  7.24     Ca    8.2        26 May 2020 10:30          CAPILLARY BLOOD GLUCOSE      POCT Blood Glucose.: 98 mg/dL (26 May 2020 04:55)  POCT Blood Glucose.: 108 mg/dL (25 May 2020 20:41)        Assessment and Plan:  84 yo male with Hx. of end stage CKD on hemodialysis and recent GI bleed, who was sent to the ED from [facility] because of low grade temp and tested positive for COVID 19. Found to have black stool concerning for acute GI bleed.    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 9.2 today  -protonix/pepcid  -hold aspirin and eliquis  -GI evaluated -->     COVID 19 infection: Asymptomatic  -chest xray no significant findings, slight left effusion  -Monitor clinically    ESRD:  -c/w HD  -epo    Chronic afib:  -hold aspirin and eliquis in setting of GI bleed  -cardio eval appreciated    DM2:  -A1c 5.2  -stop RAISS  -FS testing BID  -EPO      VTEP:   -venodynes    Dispo:  -monitor for bleeding  -Possible d/c tomorrow

## 2020-05-26 NOTE — PROGRESS NOTE ADULT - ASSESSMENT
84 yo man with ESRD post recent hosp for GIB now positive for COVID 19.  Admitted due to significant drop in HGB again.  --ESRD : Hd today and continue TIW-TTS schedule.  --GIB/Anemia  : transfuse 2 units PRBC today.  Continue JARED.  --COVID 19 : supportive care.    D/w ED MD and pt's son.    5/25 SY  --ESRD : continue TIW HD TTS.  --GIB : GI follow up appreciated.  Awaiting intervention. Possible enteroscopy  --A FIB : a/c on hold due to recurrent GIB.  --COVID 19 : diffuse body ache.   Start pain meds.    5/26 SY  --ESRD : HD order and tx reviewed.  Tolerating tx well.  --GIB : post small bowel enteroscopy.  Tolerated procedure well.     HGB holding steady.  --COVID 19 : continue pain meds for body ache.  --A FIB : a/c on hold for GIB.

## 2020-05-26 NOTE — PROGRESS NOTE ADULT - SUBJECTIVE AND OBJECTIVE BOX
NEPHROLOGY INTERVAL HPI/OVERNIGHT EVENTS:    Date of Service: 20 @ 14:45   SY  No acute events.  Post Enteroscopy today   Bleeding Angioectasia in Jejunum injected with epi and argon plasma.      SY  Tolerated HD ok on .  Reports discomfort with generalized body ache.  Only limited po intake.    HPI:  84 yo man with ESRD on maintenance HD TIW.  Post recent  admission for GIB.  Was covid 19 negative and had EGD revealing gastric AVM with Clipping.   Pt was d/jaiden home in stable condition on 5/15.  Developed low grade fever on  ( pt's daughter and son in law tested positive for COVID 19 and were isolating in basement), brought to [facility] on  but sent home after nasal swab for Covid-19 PCR.  Pt was dialyzed on  at [facility] in isolation.    Today, pt's son called confirming positive COCID 19 PCR.  Pt sent to  ED due to symptoms and due to concern for transportation to dialysis.  D/w pt's son who reported he will be able to take care of pt and be responsible for transportation to HD.  Originally plan to d/c post HD today.    However, Hgb low again at 7.  Therefore will admit for GI follow up and to assure stability of HGB.    PMHX and PSHX.  1.Hx of DM  2.HTN  3.Post TAVR.  4.Hypothyroidism  5.BPH  6.Cutaneous T Cell Lymphoma  7.GIB ( Gastric AVM clipped 2020)    MEDICATIONS  (STANDING):  epoetin-shakila-epbx (RETACRIT) Injectable 45374 Unit(s) SubCutaneous every 7 days  famotidine    Tablet 20 milliGRAM(s) Oral daily  heparin Lock (1,000 Units/mL) Injectable 1 Dose(s) Catheter once  heparin Lock (1,000 Units/mL) Injectable 1 Dose(s) Catheter once  levothyroxine 75 MICROGram(s) Oral daily  melatonin 5 milliGRAM(s) Oral at bedtime  metoprolol tartrate 25 milliGRAM(s) Oral two times a day  pantoprazole    Tablet 40 milliGRAM(s) Oral before breakfast  sevelamer carbonate 800 milliGRAM(s) Oral three times a day  tamsulosin 0.4 milliGRAM(s) Oral at bedtime    MEDICATIONS  (PRN):  ondansetron Injectable 4 milliGRAM(s) IV Push once PRN Nausea and/or Vomiting  oxycodone    5 mG/acetaminophen 325 mG 1 Tablet(s) Oral every 6 hours PRN Moderate Pain (4 - 6)  oxyCODONE    IR 5 milliGRAM(s) Oral once PRN Moderate Pain (4 - 6)    Vital Signs Last 24 Hrs  T(C): 37.1 (26 May 2020 14:27), Max: 37.7 (25 May 2020 20:30)  T(F): 98.8 (26 May 2020 14:27), Max: 99.8 (25 May 2020 20:30)  HR: 105 (26 May 2020 14:27) (90 - 110)  BP: 156/67 (26 May 2020 14:27) (122/70 - 174/90)  BP(mean): --  RR: 16 (26 May 2020 14:27) (16 - 21)  SpO2: 98% (26 May 2020 09:54) (95% - 98%)  Daily     Daily Weight in k.4 (25 May 2020 15:04)     @ 07:  -   @ 07:00  --------------------------------------------------------  IN: 50 mL / OUT: 0 mL / NET: 50 mL     @ 07:01  -   @ 14:45  --------------------------------------------------------  IN: 140 mL / OUT: 0 mL / NET: 140 mL    PHYSICAL EXAM: alert and comfortable  GENERAL: No distress  CHEST/LUNG: clear to aus  HEART: S1S2 RRR  ABDOMEN: soft  EXTREMITIES: decreased edema  SKIN:     LABS:                        9.2    8.15  )-----------( 147      ( 26 May 2020 10:30 )             28.5         134<L>  |  99  |  81<H>  ----------------------------<  125<H>  3.4<L>   |  22  |  7.24<H>    Ca    8.2<L>      26 May 2020 10:30                  RADIOLOGY & ADDITIONAL TESTS:

## 2020-05-27 LAB
BASOPHILS # BLD AUTO: 0.01 K/UL — SIGNIFICANT CHANGE UP (ref 0–0.2)
BASOPHILS NFR BLD AUTO: 0.1 % — SIGNIFICANT CHANGE UP (ref 0–2)
EOSINOPHIL # BLD AUTO: 0.01 K/UL — SIGNIFICANT CHANGE UP (ref 0–0.5)
EOSINOPHIL NFR BLD AUTO: 0.1 % — SIGNIFICANT CHANGE UP (ref 0–6)
HCT VFR BLD CALC: 31.5 % — LOW (ref 39–50)
HGB BLD-MCNC: 10.1 G/DL — LOW (ref 13–17)
IMM GRANULOCYTES NFR BLD AUTO: 0.6 % — SIGNIFICANT CHANGE UP (ref 0–1.5)
LYMPHOCYTES # BLD AUTO: 0.7 K/UL — LOW (ref 1–3.3)
LYMPHOCYTES # BLD AUTO: 9 % — LOW (ref 13–44)
MCHC RBC-ENTMCNC: 30 PG — SIGNIFICANT CHANGE UP (ref 27–34)
MCHC RBC-ENTMCNC: 32.1 GM/DL — SIGNIFICANT CHANGE UP (ref 32–36)
MCV RBC AUTO: 93.5 FL — SIGNIFICANT CHANGE UP (ref 80–100)
MONOCYTES # BLD AUTO: 0.66 K/UL — SIGNIFICANT CHANGE UP (ref 0–0.9)
MONOCYTES NFR BLD AUTO: 8.5 % — SIGNIFICANT CHANGE UP (ref 2–14)
NEUTROPHILS # BLD AUTO: 6.34 K/UL — SIGNIFICANT CHANGE UP (ref 1.8–7.4)
NEUTROPHILS NFR BLD AUTO: 81.7 % — HIGH (ref 43–77)
PLATELET # BLD AUTO: 168 K/UL — SIGNIFICANT CHANGE UP (ref 150–400)
RBC # BLD: 3.37 M/UL — LOW (ref 4.2–5.8)
RBC # FLD: 19 % — HIGH (ref 10.3–14.5)
WBC # BLD: 7.77 K/UL — SIGNIFICANT CHANGE UP (ref 3.8–10.5)
WBC # FLD AUTO: 7.77 K/UL — SIGNIFICANT CHANGE UP (ref 3.8–10.5)

## 2020-05-27 PROCEDURE — 99232 SBSQ HOSP IP/OBS MODERATE 35: CPT

## 2020-05-27 RX ORDER — ACETAMINOPHEN 500 MG
650 TABLET ORAL EVERY 6 HOURS
Refills: 0 | Status: DISCONTINUED | OUTPATIENT
Start: 2020-05-27 | End: 2020-06-01

## 2020-05-27 RX ADMIN — FAMOTIDINE 20 MILLIGRAM(S): 10 INJECTION INTRAVENOUS at 11:12

## 2020-05-27 RX ADMIN — PANTOPRAZOLE SODIUM 40 MILLIGRAM(S): 20 TABLET, DELAYED RELEASE ORAL at 06:47

## 2020-05-27 RX ADMIN — OXYCODONE AND ACETAMINOPHEN 1 TABLET(S): 5; 325 TABLET ORAL at 11:15

## 2020-05-27 RX ADMIN — SEVELAMER CARBONATE 800 MILLIGRAM(S): 2400 POWDER, FOR SUSPENSION ORAL at 00:00

## 2020-05-27 RX ADMIN — SEVELAMER CARBONATE 800 MILLIGRAM(S): 2400 POWDER, FOR SUSPENSION ORAL at 13:29

## 2020-05-27 RX ADMIN — TAMSULOSIN HYDROCHLORIDE 0.4 MILLIGRAM(S): 0.4 CAPSULE ORAL at 21:18

## 2020-05-27 RX ADMIN — Medication 25 MILLIGRAM(S): at 17:36

## 2020-05-27 RX ADMIN — Medication 75 MICROGRAM(S): at 06:47

## 2020-05-27 RX ADMIN — Medication 5 MILLIGRAM(S): at 21:18

## 2020-05-27 RX ADMIN — Medication 650 MILLIGRAM(S): at 22:05

## 2020-05-27 RX ADMIN — Medication 5 MILLIGRAM(S): at 00:00

## 2020-05-27 RX ADMIN — SEVELAMER CARBONATE 800 MILLIGRAM(S): 2400 POWDER, FOR SUSPENSION ORAL at 06:47

## 2020-05-27 RX ADMIN — SEVELAMER CARBONATE 800 MILLIGRAM(S): 2400 POWDER, FOR SUSPENSION ORAL at 21:18

## 2020-05-27 RX ADMIN — TAMSULOSIN HYDROCHLORIDE 0.4 MILLIGRAM(S): 0.4 CAPSULE ORAL at 00:00

## 2020-05-27 RX ADMIN — Medication 25 MILLIGRAM(S): at 06:47

## 2020-05-27 NOTE — PROGRESS NOTE ADULT - SUBJECTIVE AND OBJECTIVE BOX
NEPHROLOGY INTERVAL HPI/OVERNIGHT EVENTS:    Date of Service: 05-27-20 @ 11:16  5/27 SY  No acute events overnight.  Post Enteroscopy yesterday.  Tolerated procedure well.  Upset due to no d/c today.    5/26 SY  No acute events.  Post Enteroscopy today   Bleeding Angioectasia in Jejunum injected with epi and argon plasma.     5/25 SY  Tolerated HD ok on 5/23.  Reports discomfort with generalized body ache.  Only limited po intake.    HPI:  86 yo man with ESRD on maintenance HD TIW.  Post recent  admission for GIB.  Was covid 19 negative and had EGD revealing gastric AVM with Clipping.   Pt was d/jaiden home in stable condition on 5/15.  Developed low grade fever on 5/18 ( pt's daughter and son in law tested positive for COVID 19 and were isolating in basement), brought to [facility] on 5/19 but sent home after nasal swab for Covid-19 PCR.  Pt was dialyzed on 5/21 at [place] [facility] in isolation.    Today, pt's son called confirming positive COCID 19 PCR.  Pt sent to  ED due to symptoms and due to concern for transportation to dialysis.  D/w pt's son who reported he will be able to take care of pt and be responsible for transportation to HD.  Originally plan to d/c post HD today.    However, Hgb low again at 7.  Therefore will admit for GI follow up and to assure stability of HGB.    PMHX and PSHX.  1.Hx of DM  2.HTN  3.Post TAVR.  4.Hypothyroidism  5.BPH  6.Cutaneous T Cell Lymphoma  7.GIB ( Gastric AVM clipped 5/12/2020)    MEDICATIONS  (STANDING):  epoetin-shakila-epbx (RETACRIT) Injectable 52354 Unit(s) SubCutaneous every 7 days  famotidine    Tablet 20 milliGRAM(s) Oral daily  levothyroxine 75 MICROGram(s) Oral daily  melatonin 5 milliGRAM(s) Oral at bedtime  metoprolol tartrate 25 milliGRAM(s) Oral two times a day  pantoprazole    Tablet 40 milliGRAM(s) Oral before breakfast  sevelamer carbonate 800 milliGRAM(s) Oral three times a day  tamsulosin 0.4 milliGRAM(s) Oral at bedtime    MEDICATIONS  (PRN):  ondansetron Injectable 4 milliGRAM(s) IV Push once PRN Nausea and/or Vomiting  oxycodone    5 mG/acetaminophen 325 mG 1 Tablet(s) Oral every 6 hours PRN Moderate Pain (4 - 6)  oxyCODONE    IR 5 milliGRAM(s) Oral once PRN Moderate Pain (4 - 6)    Vital Signs Last 24 Hrs  T(C): 37.3 (27 May 2020 11:12), Max: 37.4 (27 May 2020 05:42)  T(F): 99.1 (27 May 2020 11:12), Max: 99.3 (27 May 2020 05:42)  HR: 89 (27 May 2020 11:12) (89 - 123)  BP: 105/60 (27 May 2020 11:12) (105/60 - 174/90)  BP(mean): --  RR: 18 (27 May 2020 11:12) (16 - 18)  SpO2: 100% (27 May 2020 11:12) (98% - 100%)    05-26 @ 07:01  -  05-27 @ 07:00  --------------------------------------------------------  IN: 140 mL / OUT: 0 mL / NET: 140 mL    PHYSICAL EXAM: Alert and comfortable.  GENERAL: No distress  CHEST/LUNG: fair air entry  HEART: S1S2 RRR  ABDOMEN: soft  EXTREMITIES: no edema  SKIN:     LABS:                        10.1   7.77  )-----------( 168      ( 27 May 2020 08:27 )             31.5     05-26    134<L>  |  99  |  81<H>  ----------------------------<  125<H>  3.4<L>   |  22  |  7.24<H>    Ca    8.2<L>      26 May 2020 10:30                  RADIOLOGY & ADDITIONAL TESTS:

## 2020-05-27 NOTE — PROGRESS NOTE ADULT - SUBJECTIVE AND OBJECTIVE BOX
CC: Anemia History of Present Illness: 	  HPI: The patient is an 86 yo male with Hx. of end stage CKD on hemodialysis and recent GI bleed, who was sent to the ED from [facility] dialysis because of low grade temp and tested positive for COVID 19. He had EGD for anemia  and found with AVM which was clipped. He was discharged home on  5/15/20.   On 5/18/20 he developed low grade fever. His daughter and son in low tested positive for COVID 19 and were self  isolating in basement. He tested tested positive for COVID 19 on 519/20. He was dialysed on 5/21/20 at  [facility] in isolation. He had nephrology consult  in the ED. The patient was found with low Hg. of 7 and was also complaining of black stool and tested heme positive in the ED. Referred for admission for GI bleed.    5/24: Sitting in chair, comfortable, having black stool.  No fever, chills, HD stable.  5/25: Sitting in chair, HD stable, awaiting GI intervention.  05/26/20: Patient seen and examined. Had HD and enteroscopy today. He denies any new complaints.   05/27/20: Patient seen and examined. Sitting in a chair.     REVIEW OF SYSTEMS: All other review of systems is negative unless indicated above.    Vital Signs Last 24 Hrs  T(C): 37.3 (27 May 2020 11:12), Max: 37.4 (27 May 2020 05:42)  T(F): 99.1 (27 May 2020 11:12), Max: 99.3 (27 May 2020 05:42)  HR: 89 (27 May 2020 11:12) (89 - 111)  BP: 105/60 (27 May 2020 11:12) (105/60 - 124/66)  BP(mean): --  RR: 18 (27 May 2020 11:12) (18 - 18)  SpO2: 100% (27 May 2020 11:12) (98% - 100%)      PHYSICAL EXAM:    Constitutional: NAD, awake and alert, well-developed  HEENT: PERR, EOMI, Normal Hearing, MMM  Neck: Soft and supple  Respiratory: Breath sounds are clear bilaterally, No wheezing, rales or rhonchi  Cardiovascular: S1 and S2, regular rate and rhythm, no Murmurs, gallops or rubs  Gastrointestinal: Bowel Sounds present, soft, nontender, nondistended, no guarding, no rebound  Extremities: No peripheral edema  Neurological: A/O x 3, no focal deficits in my limited exam    MEDICATIONS  (STANDING):  dextrose 5%. 1000 milliLiter(s) (50 mL/Hr) IV Continuous <Continuous>  dextrose 50% Injectable 12.5 Gram(s) IV Push once  dextrose 50% Injectable 25 Gram(s) IV Push once  dextrose 50% Injectable 25 Gram(s) IV Push once  epoetin-shakila-epbx (RETACRIT) Injectable 77425 Unit(s) SubCutaneous every 7 days  famotidine    Tablet 20 milliGRAM(s) Oral daily  insulin lispro (HumaLOG) corrective regimen sliding scale   SubCutaneous three times a day before meals  levothyroxine 75 MICROGram(s) Oral daily  melatonin 5 milliGRAM(s) Oral at bedtime  metoprolol tartrate 25 milliGRAM(s) Oral two times a day  pantoprazole    Tablet 40 milliGRAM(s) Oral before breakfast  sevelamer carbonate 800 milliGRAM(s) Oral three times a day  tamsulosin 0.4 milliGRAM(s) Oral at bedtime    MEDICATIONS  (PRN):  dextrose 40% Gel 15 Gram(s) Oral once PRN Blood Glucose LESS THAN 70 milliGRAM(s)/deciliter  glucagon  Injectable 1 milliGRAM(s) IntraMuscular once PRN Glucose LESS THAN 70 milligrams/deciliter                              10.1   7.77  )-----------( 168      ( 27 May 2020 08:27 )             31.5     26 May 2020 10:30    134    |  99     |  81     ----------------------------<  125    3.4     |  22     |  7.24     Ca    8.2        26 May 2020 10:30          CAPILLARY BLOOD GLUCOSE      POCT Blood Glucose.: 145 mg/dL (27 May 2020 08:00)  POCT Blood Glucose.: 112 mg/dL (26 May 2020 20:58)              Assessment and Plan:  86 yo male with Hx. of end stage CKD on hemodialysis and recent GI bleed, who was sent to the ED from [facility] because of low grade temp and tested positive for COVID 19. Found to have black stool concerning for acute GI bleed.    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

## 2020-05-27 NOTE — PROGRESS NOTE ADULT - ASSESSMENT
86 yo man with ESRD post recent hosp for GIB now positive for COVID 19.  Admitted due to significant drop in HGB again.  --ESRD : Hd today and continue TIW-TTS schedule.  --GIB/Anemia  : transfuse 2 units PRBC today.  Continue JARED.  --COVID 19 : supportive care.    D/w ED MD and pt's son.    5/25 SY  --ESRD : continue TIW HD TTS.  --GIB : GI follow up appreciated.  Awaiting intervention. Possible enteroscopy  --A FIB : a/c on hold due to recurrent GIB.  --COVID 19 : diffuse body ache.   Start pain meds.    5/26 SY  --ESRD : HD order and tx reviewed.  Tolerating tx well.  --GIB : post small bowel enteroscopy.  Tolerated procedure well.     HGB holding steady.  --COVID 19 : continue pain meds for body ache.  --A FIB : a/c on hold for GIB.    5/27 SY  --ESRD : Continue TIW HD.   --GIB : Post Enteroscopy,  Bleeding Angioectasia in Jejunum.  HGB stable.  --A FIB : maintain off a/c due to recurrent GIB.  --COVID 19 : continue supportive care.  --D/c planning post HD tomorrow.

## 2020-05-27 NOTE — PROGRESS NOTE ADULT - SUBJECTIVE AND OBJECTIVE BOX
Patient is a 85y old  Male who presents with a chief complaint of Anemia, COVID 19 infection (27 May 2020 11:16)      HPI:  HPI: The patient is an 84 yo male with Hx. 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 He had EGD for anemia  and found with AVM which was clipped. He was discharged home on  5/15/20.   On 5/18/20 he developed low grade fever. His daughter and son in low tested positive for COVID 19 and were self  isolating in basement. He tested tested positive for COVID 19 on 519/20. He was dialysed on 5/21/20 at  [facility] in isolation. He had nephrology consult  in the ED. The patient was found with low Hg. of 7 and was also complaining of black stool and tested heme positive in the ED. Referred for admission for GI bleed.      no farther bleeding and comf  neg N V  shawn diet    PMHx: DM ESKD, Atrial fib. on Eliquis,  HTN, Hypthyroid , BPH,  cutaneous T cell lymphoma,     PSHx: neg      Lab:                       7.0    4.62  )-----------( 130      ( 23 May 2020 11:48 )             22.3       05-23    138  |  102  |  66<H>  ----------------------------<  104<H>  3.9   |  26  |  6.82<H>    Ca    8.4<L>      23 May 2020 11:48  Phos  4.5     05-23  Mg     2.2     05-23 (23 May 2020 20:31)      PAST MEDICAL & SURGICAL HISTORY:  Afib  TIA (transient ischemic attack)  CAD (coronary artery disease)  OAB (overactive bladder)  Gout  LBBB (left bundle branch block)  CHF (congestive heart failure)  Aortic valve stenosis  Squamous cell carcinoma of skin  Pleural effusion on left  SANG (obstructive sleep apnea)  ESRD on dialysis  BPH (benign prostatic hyperplasia)  Cutaneous T-cell lymphoma  Hypothyroid  Hypercholesteremia  DM (diabetes mellitus)  HTN (hypertension)  H/O eye surgery  S/P coronary artery stent placement  S/P TAVR (transcatheter aortic valve replacement)  H/O hernia repair      MEDICATIONS  (STANDING):  epoetin-shakila-epbx (RETACRIT) Injectable 42022 Unit(s) SubCutaneous every 7 days  famotidine    Tablet 20 milliGRAM(s) Oral daily  levothyroxine 75 MICROGram(s) Oral daily  melatonin 5 milliGRAM(s) Oral at bedtime  metoprolol tartrate 25 milliGRAM(s) Oral two times a day  pantoprazole    Tablet 40 milliGRAM(s) Oral before breakfast  sevelamer carbonate 800 milliGRAM(s) Oral three times a day  tamsulosin 0.4 milliGRAM(s) Oral at bedtime    MEDICATIONS  (PRN):  ondansetron Injectable 4 milliGRAM(s) IV Push once PRN Nausea and/or Vomiting  oxycodone    5 mG/acetaminophen 325 mG 1 Tablet(s) Oral every 6 hours PRN Moderate Pain (4 - 6)  oxyCODONE    IR 5 milliGRAM(s) Oral once PRN Moderate Pain (4 - 6)      Allergies    No Known Allergies    Intolerances        SOCIAL HISTORY:NC    FAMILY HISTORY:  No pertinent family history in first degree relatives      REVIEW OF SYSTEMS:    CONSTITUTIONAL: No , fevers or chills  EYES/ENT: No visual changes;  No vertigo or throat pain   NECK: No pain or stiffness  RESPIRATORY: No cough, wheezing, hemoptysis; No shortness of breath  CARDIOVASCULAR: No chest pain or palpitations  GENITOURINARY: No dysuria, frequency or hematuria  NEUROLOGICAL: No numbness or weakness  SKIN: No itching, burning, rashes, or lesions   All other review of systems is negative unless indicated above.    Vital Signs Last 24 Hrs  T(C): 37.3 (27 May 2020 11:12), Max: 37.4 (27 May 2020 05:42)  T(F): 99.1 (27 May 2020 11:12), Max: 99.3 (27 May 2020 05:42)  HR: 89 (27 May 2020 11:12) (89 - 123)  BP: 105/60 (27 May 2020 11:12) (105/60 - 161/77)  BP(mean): --  RR: 18 (27 May 2020 11:12) (16 - 18)  SpO2: 100% (27 May 2020 11:12) (98% - 100%)    PHYSICAL EXAM:    Constitutional: NAD, well-developed  HEENT: EOMI, throat clear  Neck: No LAD, supple  Respiratory: CTA and P  Cardiovascular: S1 and S2, RRR, no M  Gastrointestinal: BS+, soft, NT/ND, neg HSM,  Extremities: No peripheral edema, neg clubing, cyanosis  Vascular: 2+ peripheral pulses  Neurological: A/O x 3, no focal deficits  Psychiatric: Normal mood, normal affect  Skin: No rashes    LABS:  CBC Full  -  ( 27 May 2020 08:27 )  WBC Count : 7.77 K/uL  RBC Count : 3.37 M/uL  Hemoglobin : 10.1 g/dL  Hematocrit : 31.5 %  Platelet Count - Automated : 168 K/uL  Mean Cell Volume : 93.5 fl  Mean Cell Hemoglobin : 30.0 pg  Mean Cell Hemoglobin Concentration : 32.1 gm/dL  Auto Neutrophil # : 6.34 K/uL  Auto Lymphocyte # : 0.70 K/uL  Auto Monocyte # : 0.66 K/uL  Auto Eosinophil # : 0.01 K/uL  Auto Basophil # : 0.01 K/uL  Auto Neutrophil % : 81.7 %  Auto Lymphocyte % : 9.0 %  Auto Monocyte % : 8.5 %  Auto Eosinophil % : 0.1 %  Auto Basophil % : 0.1 %    05-26    134<L>  |  99  |  81<H>  ----------------------------<  125<H>  3.4<L>   |  22  |  7.24<H>    Ca    8.2<L>      26 May 2020 10:30          05-23 @ 11:48  Hep A Igm Nonreact  Hep A total ab, IgA and M --  Hep B core Ab total --  Hep B core IgM Nonreact  Hep B DNA PCR --  Hep BSAg --  Hep BSAb --  Hep BSAb --  HCV Ab --  HCV RNA Log --  HCV RNA interp --                RADIOLOGY & ADDITIONAL STUDIES:  < from: CT Abdomen and Pelvis w/o Cont (10.23.14 @ 09:20) >  EXAM:  CT ABD AND PELV WO CON        PROCEDURE DATE:  Oct 23 2014       INTERPRETATION:  HISTORY: L lymphoma limited to the skin. Surveillance   examination.    Technique: CT scan of the chest, abdomen and pelvis was performed from   the thoracic inlet to the symphysis pubis without intravenous contrast.   Oral contrast was administered. Sagittal and coronal reformatted images   were provided. Axial MIP reformatted images of the chest were also   obtained. Comparison is made to PET/CT scan dated 5/6/2009.    FINDINGS: The thyroid gland is unremarkable. There is no axillary,   mediastinal or hilar adenopathy. The heart is normal in size. Small   pericardial effusion is slightly increased. The thoracic aorta and main   pulmonary arteries are normal in caliber. Coronary arterial and thoracic   aortic calcifications are noted. There is no pleural effusion. The   tracheobronchial tree is patent.     Calcified granulomas noted in the lingula. No other pulmonary nodules are   noted. There is no pulmonary mass or consolidation.    Evaluation of the parenchymal organs and vascular structures is limited   without intravenous contrast.  The liver, spleen, pancreas and adrenal glands are grossly unremarkable.   Cholelithiasis is present.    There is no hydronephrosis or renal calculus. The abdominal aorta is   normal in caliber with mild atherosclerotic calcifications. There is no   abdominal or pelvic adenopathy. There is no ascites.    The large and small bowel are normal in caliber without obstruction.   Sigmoid diverticulosis is stable. The appendix is normal. There is no   free intraperitoneal air or abdominal abscess.    The urinary bladder is unremarkable. Enlarged prostate gland is stable.     The visualized osseous structures demonstrate degenerative changes.    IMPRESSION: No adenopathy in the chest, abdomen or pelvis.                TEE DAMON M.D., ATTENDING RADIOLOGIST  This examination was interpreted on: Oct 23 2014  3:12P.  This document   has been electronically signed. Oct 23 2014  3:42P.            < end of copied text >

## 2020-05-27 NOTE — PROGRESS NOTE ADULT - ASSESSMENT
86 yo male with Hx. 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 Found to have black stool concerning for acute GI bleed.    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  No bleeding from site of gastric AVMs noted and area appears to be healing well.  Patient at high risk of having further AVMs in the small intestine.  Discussed with cardiology and family regarding increased risk of bleeding as well as increased risk of cardioembolic events.  For now, would hold anticoagulation and they are considering a watchman procedure.  Discussed with cardiology, they will arrange.  Risk of further AVMs in the small intestine also discussed.  Importance of evaluation and follow-up for this with a PillCam study also reviewed.  Would perform CT enterography.      History of A. fib as well as a stroke.  Patient has an outpatient gastroenterologist and is considering following up with him.  When discussed with daughter that patient has had gastric intestinal metaplasia, she states that patient will follow-up with her own gastroenterologist      He had a colonoscopy last year however, results of this are not available to us.  We have requested this but have not received it.  Per daughter, this colonoscopy was negative.

## 2020-05-28 LAB
ALBUMIN SERPL ELPH-MCNC: 2.3 G/DL — LOW (ref 3.3–5)
ANION GAP SERPL CALC-SCNC: 12 MMOL/L — SIGNIFICANT CHANGE UP (ref 5–17)
BUN SERPL-MCNC: 60 MG/DL — HIGH (ref 7–23)
CALCIUM SERPL-MCNC: 8.2 MG/DL — LOW (ref 8.5–10.1)
CHLORIDE SERPL-SCNC: 94 MMOL/L — LOW (ref 96–108)
CO2 SERPL-SCNC: 22 MMOL/L — SIGNIFICANT CHANGE UP (ref 22–31)
CREAT SERPL-MCNC: 6.79 MG/DL — HIGH (ref 0.5–1.3)
GLUCOSE SERPL-MCNC: 99 MG/DL — SIGNIFICANT CHANGE UP (ref 70–99)
HCT VFR BLD CALC: 32.4 % — LOW (ref 39–50)
HGB BLD-MCNC: 10.3 G/DL — LOW (ref 13–17)
MCHC RBC-ENTMCNC: 29.6 PG — SIGNIFICANT CHANGE UP (ref 27–34)
MCHC RBC-ENTMCNC: 31.8 GM/DL — LOW (ref 32–36)
MCV RBC AUTO: 93.1 FL — SIGNIFICANT CHANGE UP (ref 80–100)
PHOSPHATE SERPL-MCNC: 5.2 MG/DL — HIGH (ref 2.5–4.5)
PLATELET # BLD AUTO: 169 K/UL — SIGNIFICANT CHANGE UP (ref 150–400)
POTASSIUM SERPL-MCNC: 3.8 MMOL/L — SIGNIFICANT CHANGE UP (ref 3.5–5.3)
POTASSIUM SERPL-SCNC: 3.8 MMOL/L — SIGNIFICANT CHANGE UP (ref 3.5–5.3)
RBC # BLD: 3.48 M/UL — LOW (ref 4.2–5.8)
RBC # FLD: 18.6 % — HIGH (ref 10.3–14.5)
SODIUM SERPL-SCNC: 128 MMOL/L — LOW (ref 135–145)
WBC # BLD: 8.03 K/UL — SIGNIFICANT CHANGE UP (ref 3.8–10.5)
WBC # FLD AUTO: 8.03 K/UL — SIGNIFICANT CHANGE UP (ref 3.8–10.5)

## 2020-05-28 PROCEDURE — 99232 SBSQ HOSP IP/OBS MODERATE 35: CPT

## 2020-05-28 RX ORDER — HEPARIN SODIUM 5000 [USP'U]/ML
1 INJECTION INTRAVENOUS; SUBCUTANEOUS ONCE
Refills: 0 | Status: COMPLETED | OUTPATIENT
Start: 2020-05-28 | End: 2020-05-28

## 2020-05-28 RX ADMIN — Medication 25 MILLIGRAM(S): at 05:41

## 2020-05-28 RX ADMIN — HEPARIN SODIUM 1 DOSE(S): 5000 INJECTION INTRAVENOUS; SUBCUTANEOUS at 14:20

## 2020-05-28 RX ADMIN — Medication 5 MILLIGRAM(S): at 21:22

## 2020-05-28 RX ADMIN — PANTOPRAZOLE SODIUM 40 MILLIGRAM(S): 20 TABLET, DELAYED RELEASE ORAL at 05:41

## 2020-05-28 RX ADMIN — FAMOTIDINE 20 MILLIGRAM(S): 10 INJECTION INTRAVENOUS at 09:31

## 2020-05-28 RX ADMIN — Medication 75 MICROGRAM(S): at 05:41

## 2020-05-28 RX ADMIN — Medication 650 MILLIGRAM(S): at 21:29

## 2020-05-28 RX ADMIN — SEVELAMER CARBONATE 800 MILLIGRAM(S): 2400 POWDER, FOR SUSPENSION ORAL at 05:41

## 2020-05-28 RX ADMIN — Medication 25 MILLIGRAM(S): at 17:18

## 2020-05-28 RX ADMIN — SEVELAMER CARBONATE 800 MILLIGRAM(S): 2400 POWDER, FOR SUSPENSION ORAL at 17:18

## 2020-05-28 RX ADMIN — TAMSULOSIN HYDROCHLORIDE 0.4 MILLIGRAM(S): 0.4 CAPSULE ORAL at 21:22

## 2020-05-28 NOTE — PROGRESS NOTE ADULT - SUBJECTIVE AND OBJECTIVE BOX
NEPHROLOGY INTERVAL HPI/OVERNIGHT EVENTS:    Date of Service: 05-28-20 @ 12:01  5/28 SY  No acute events overnight.  Upset due to cancelled d/c.  Family unable to take care of pt as children are positive for COVID 19 and feeling unwell.  Now will need to test negative for COVID 19 PCR prior to d/c to rehab.  Eating better and pain improved.  No SOB.    5/27 SY  No acute events overnight.  Post Enteroscopy yesterday.  Tolerated procedure well.  Upset due to no d/c today.    5/26 SY  No acute events.  Post Enteroscopy today   Bleeding Angioectasia in Jejunum injected with epi and argon plasma.     5/25 SY  Tolerated HD ok on 5/23.  Reports discomfort with generalized body ache.  Only limited po intake.    HPI:  86 yo man with ESRD on maintenance HD TIW.  Post recent  admission for GIB.  Was covid 19 negative and had EGD revealing gastric AVM with Clipping.   Pt was d/jaiden home in stable condition on 5/15.  Developed low grade fever on 5/18 ( pt's daughter and son in law tested positive for COVID 19 and were isolating in basement), brought to [facility] on 5/19 but sent home after nasal swab for Covid-19 PCR.  Pt was dialyzed on 5/21 at [facility] in isolation.    Today, pt's son called confirming positive COCID 19 PCR.  Pt sent to  ED due to symptoms and due to concern for transportation to dialysis.  D/w pt's son who reported he will be able to take care of pt and be responsible for transportation to HD.  Originally plan to d/c post HD today.    However, Hgb low again at 7.  Therefore will admit for GI follow up and to assure stability of HGB.    PMHX and PSHX.  1.Hx of DM  2.HTN  3.Post TAVR.  4.Hypothyroidism  5.BPH  6.Cutaneous T Cell Lymphoma  7.GIB ( Gastric AVM clipped 5/12/2020)    MEDICATIONS  (STANDING):  epoetin-shakila-epbx (RETACRIT) Injectable 67409 Unit(s) SubCutaneous every 7 days  famotidine    Tablet 20 milliGRAM(s) Oral daily  levothyroxine 75 MICROGram(s) Oral daily  melatonin 5 milliGRAM(s) Oral at bedtime  metoprolol tartrate 25 milliGRAM(s) Oral two times a day  pantoprazole    Tablet 40 milliGRAM(s) Oral before breakfast  sevelamer carbonate 800 milliGRAM(s) Oral three times a day  tamsulosin 0.4 milliGRAM(s) Oral at bedtime    MEDICATIONS  (PRN):  acetaminophen   Tablet .. 650 milliGRAM(s) Oral every 6 hours PRN Mild Pain (1 - 3)  ondansetron Injectable 4 milliGRAM(s) IV Push once PRN Nausea and/or Vomiting  oxycodone    5 mG/acetaminophen 325 mG 1 Tablet(s) Oral every 6 hours PRN Moderate Pain (4 - 6)  oxyCODONE    IR 5 milliGRAM(s) Oral once PRN Moderate Pain (4 - 6)    Vital Signs Last 24 Hrs  T(C): 36.4 (28 May 2020 10:45), Max: 37.1 (27 May 2020 20:49)  T(F): 97.6 (28 May 2020 10:45), Max: 98.7 (27 May 2020 20:49)  HR: 103 (28 May 2020 11:54) (68 - 105)  BP: 103/58 (28 May 2020 11:54) (103/58 - 137/78)  BP(mean): --  RR: 18 (28 May 2020 11:54) (18 - 20)  SpO2: 97% (28 May 2020 04:56) (97% - 98%)    PHYSICAL EXAM: Alert and comfortable.  GENERAL: No distress  CHEST/LUNG: Fair air entry  HEART: S1S2 RRR  ABDOMEN: soft  EXTREMITIES: no edema  SKIN:     LABS:                        10.3   8.03  )-----------( 169      ( 28 May 2020 08:31 )             32.4     05-28    128<L>  |  94<L>  |  60<H>  ----------------------------<  99  3.8   |  22  |  6.79<H>    Ca    8.2<L>      28 May 2020 08:31  Phos  5.2     05-28    TPro  x   /  Alb  2.3<L>  /  TBili  x   /  DBili  x   /  AST  x   /  ALT  x   /  AlkPhos  x   05-28        Phosphorus Level, Serum: 5.2 mg/dL (05-28 @ 08:31)          RADIOLOGY & ADDITIONAL TESTS:

## 2020-05-28 NOTE — PROGRESS NOTE ADULT - ASSESSMENT
84 yo male with Hx. 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 Found to have black stool concerning for acute GI bleed.    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  No bleeding from site of gastric AVMs noted and area appears to be healing well.  Patient at high risk of having further AVMs in the small intestine.  Discussed with cardiology and family regarding increased risk of bleeding as well as increased risk of cardioembolic events.  For now, would hold anticoagulation and they are considering a watchman procedure.  Discussed with cardiology, they will arrange.  Risk of further AVMs in the small intestine also discussed.  Importance of evaluation and follow-up for this with a PillCam study also reviewed.  Would perform CT enterography. ordered      History of A. fib as well as a stroke.  Patient has an outpatient gastroenterologist and is considering following up with him.  When discussed with daughter that patient has had gastric intestinal metaplasia, she states that patient will follow-up with her own gastroenterologist      He had a colonoscopy last year however, results of this are not available to us.  We have requested this but have not received it.  Per daughter, this colonoscopy was negative.

## 2020-05-28 NOTE — PROGRESS NOTE ADULT - ASSESSMENT
84 yo man with ESRD post recent hosp for GIB now positive for COVID 19.  Admitted due to significant drop in HGB again.  --ESRD : Hd today and continue TIW-TTS schedule.  --GIB/Anemia  : transfuse 2 units PRBC today.  Continue JARED.  --COVID 19 : supportive care.    D/w ED MD and pt's son.    5/25 SY  --ESRD : continue TIW HD TTS.  --GIB : GI follow up appreciated.  Awaiting intervention. Possible enteroscopy  --A FIB : a/c on hold due to recurrent GIB.  --COVID 19 : diffuse body ache.   Start pain meds.    5/26 SY  --ESRD : HD order and tx reviewed.  Tolerating tx well.  --GIB : post small bowel enteroscopy.  Tolerated procedure well.     HGB holding steady.  --COVID 19 : continue pain meds for body ache.  --A FIB : a/c on hold for GIB.    5/27 SY  --ESRD : Continue TIW HD.   --GIB : Post Enteroscopy,  Bleeding Angioectasia in Jejunum.  HGB stable.  --A FIB : maintain off a/c due to recurrent GIB.  --COVID 19 : continue supportive care.  --D/c planning post HD tomorrow.    5/28 SY  --ESRD : HD order and tx reviewed.  Tolerating tx well.  --GIB : as above : stable HGB.  --A FIB : No a/c due to GIB.  --COVID 19 : supportive care. change in d/c planning as family unable to care for pt as they are struggling with COVID 19.

## 2020-05-28 NOTE — PROGRESS NOTE ADULT - SUBJECTIVE AND OBJECTIVE BOX
Patient is a 85y old  Male who presents with a chief complaint of Anemia, COVID 19 infection (28 May 2020 12:00)      HPI:  HPI: The patient is an 86 yo male with Hx. 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 He had EGD for anemia  and found with AVM which was clipped. He was discharged home on  5/15/20.   On 5/18/20 he developed low grade fever. His daughter and son in low tested positive for COVID 19 and were self  isolating in basement. He tested tested positive for COVID 19 on 519/20. He was dialysed on 5/21/20 at  [facility] [place] in isolation. He had nephrology consult  in the ED. The patient was found with low Hg. of 7 and was also complaining of black stool and tested heme positive in the ED. Referred for admission for GI bleed.    pt comf s bleeding   neg abd pain  pos weak and fatigue  mild reflux last night      PMHx: DM ESKD, Atrial fib. on Eliquis,  HTN, Hypthyroid , BPH,  cutaneous T cell lymphoma,     PSHx: neg    Family Hx: not available.    Social Hx.: not smoking, no alcohol use    ROS:   Eyes: no changes in vision    ENT/Mouth: no changes    Cardiovascular: no chest pain    Respiratory: no SOB    Gastrointestinal: no diarrhea, no nausea, no vomiting    Genitourinary: no dysuria    Breast: no pain    Musculoskeletal: no pain    Integumentary: no itching    Neurological: No Headache, no tremor,    Endocrine: no excessive thirst,     Hematologic/Lymphatic: no swollen glands    Allergic/Immunologic: no itching      Physical Exam: Vital Signs Last 24 Hrs  T(C): 37 (23 May 2020 19:37), Max: 37 (23 May 2020 10:54)  T(F): 98.6 (23 May 2020 19:37), Max: 98.6 (23 May 2020 10:54)  HR: 90 (23 May 2020 19:37) (76 - 90)  BP: 147/65 (23 May 2020 19:37) (119/61 - 150/79)  BP(mean): 75 (23 May 2020 10:54) (75 - 75)  RR: 20 (23 May 2020 19:37) (18 - 20)  SpO2: 97% (23 May 2020 19:37) (97% - 100%)            Lab:                       7.0    4.62  )-----------( 130      ( 23 May 2020 11:48 )             22.3       05-23    138  |  102  |  66<H>  ----------------------------<  104<H>  3.9   |  26  |  6.82<H>    Ca    8.4<L>      23 May 2020 11:48  Phos  4.5     05-23  Mg     2.2     05-23 (23 May 2020 20:31)      PAST MEDICAL & SURGICAL HISTORY:  Afib  TIA (transient ischemic attack)  CAD (coronary artery disease)  OAB (overactive bladder)  Gout  LBBB (left bundle branch block)  CHF (congestive heart failure)  Aortic valve stenosis  Squamous cell carcinoma of skin  Pleural effusion on left  SANG (obstructive sleep apnea)  ESRD on dialysis  BPH (benign prostatic hyperplasia)  Cutaneous T-cell lymphoma  Hypothyroid  Hypercholesteremia  DM (diabetes mellitus)  HTN (hypertension)  H/O eye surgery  S/P coronary artery stent placement  S/P TAVR (transcatheter aortic valve replacement)  H/O hernia repair      MEDICATIONS  (STANDING):  epoetin-shakila-epbx (RETACRIT) Injectable 02491 Unit(s) SubCutaneous every 7 days  famotidine    Tablet 20 milliGRAM(s) Oral daily  heparin Lock (1,000 Units/mL) Injectable 1 Dose(s) Catheter once  heparin Lock (1,000 Units/mL) Injectable 1 Dose(s) Catheter once  levothyroxine 75 MICROGram(s) Oral daily  melatonin 5 milliGRAM(s) Oral at bedtime  metoprolol tartrate 25 milliGRAM(s) Oral two times a day  pantoprazole    Tablet 40 milliGRAM(s) Oral before breakfast  sevelamer carbonate 800 milliGRAM(s) Oral three times a day  tamsulosin 0.4 milliGRAM(s) Oral at bedtime    MEDICATIONS  (PRN):  acetaminophen   Tablet .. 650 milliGRAM(s) Oral every 6 hours PRN Mild Pain (1 - 3)  ondansetron Injectable 4 milliGRAM(s) IV Push once PRN Nausea and/or Vomiting  oxycodone    5 mG/acetaminophen 325 mG 1 Tablet(s) Oral every 6 hours PRN Moderate Pain (4 - 6)  oxyCODONE    IR 5 milliGRAM(s) Oral once PRN Moderate Pain (4 - 6)      Allergies    No Known Allergies    Intolerances        SOCIAL HISTORY:NC    FAMILY HISTORY:  No pertinent family history in first degree relatives      REVIEW OF SYSTEMS:      EYES/ENT: No visual changes;  No vertigo or throat pain   NECK: No pain or stiffness  RESPIRATORY: No cough, wheezing, hemoptysis; No shortness of breath  CARDIOVASCULAR: No chest pain or palpitations  GENITOURINARY: No dysuria, frequency or hematuria  NEUROLOGICAL: No numbness or weakness  SKIN: No itching, burning, rashes, or lesions   All other review of systems is negative unless indicated above.    Vital Signs Last 24 Hrs  T(C): 36.4 (28 May 2020 10:45), Max: 37.1 (27 May 2020 20:49)  T(F): 97.6 (28 May 2020 10:45), Max: 98.7 (27 May 2020 20:49)  HR: 115 (28 May 2020 13:35) (68 - 124)  BP: 156/65 (28 May 2020 13:35) (103/58 - 156/65)  BP(mean): --  RR: 20 (28 May 2020 13:35) (18 - 20)  SpO2: 97% (28 May 2020 04:56) (97% - 98%)        LABS:  CBC Full  -  ( 28 May 2020 08:31 )  WBC Count : 8.03 K/uL  RBC Count : 3.48 M/uL  Hemoglobin : 10.3 g/dL  Hematocrit : 32.4 %  Platelet Count - Automated : 169 K/uL  Mean Cell Volume : 93.1 fl  Mean Cell Hemoglobin : 29.6 pg  Mean Cell Hemoglobin Concentration : 31.8 gm/dL  Auto Neutrophil # : x  Auto Lymphocyte # : x  Auto Monocyte # : x  Auto Eosinophil # : x  Auto Basophil # : x  Auto Neutrophil % : x  Auto Lymphocyte % : x  Auto Monocyte % : x  Auto Eosinophil % : x  Auto Basophil % : x    05-28    128<L>  |  94<L>  |  60<H>  ----------------------------<  99  3.8   |  22  |  6.79<H>    Ca    8.2<L>      28 May 2020 08:31  Phos  5.2     05-28    TPro  x   /  Alb  2.3<L>  /  TBili  x   /  DBili  x   /  AST  x   /  ALT  x   /  AlkPhos  x   05-28        05-23 @ 11:48  Hep A Igm Nonreact  Hep A total ab, IgA and M --  Hep B core Ab total --  Hep B core IgM Nonreact  Hep B DNA PCR --  Hep BSAg --  Hep BSAb --  Hep BSAb --  HCV Ab --  HCV RNA Log --  HCV RNA interp --                RADIOLOGY & ADDITIONAL STUDIES:

## 2020-05-28 NOTE — PROGRESS NOTE ADULT - SUBJECTIVE AND OBJECTIVE BOX
CC: Anemia History of Present Illness: 	  HPI: The patient is an 84 yo male with Hx. 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 He had EGD for anemia  and found with AVM which was clipped. He was discharged home on  5/15/20.   On 5/18/20 he developed low grade fever. His daughter and son in low tested positive for COVID 19 and were self  isolating in basement. He tested tested positive for COVID 19 on 519/20. He was dialysed on 5/21/20 at  [facility] in isolation. He had nephrology consult  in the ED. The patient was found with low Hg. of 7 and was also complaining of black stool and tested heme positive in the ED. Referred for admission for GI bleed.    5/24: Sitting in chair, comfortable, having black stool.  No fever, chills, HD stable.  5/25: Sitting in chair, HD stable, awaiting GI intervention.  05/26/20: Patient seen and examined. Had HD and enteroscopy today. He denies any new complaints.   05/27/20: Patient seen and examined. Sitting in a chair.   05/28/20: Patient seen and examined. Family now requesting rehab placement. HD in progress.     REVIEW OF SYSTEMS: All other review of systems is negative unless indicated above.    Vital Signs Last 24 Hrs  T(C): 36.4 (28 May 2020 14:15), Max: 37.1 (27 May 2020 20:49)  T(F): 97.6 (28 May 2020 14:15), Max: 98.7 (27 May 2020 20:49)  HR: 112 (28 May 2020 14:15) (68 - 124)  BP: 119/53 (28 May 2020 14:15) (103/58 - 156/65)  BP(mean): --  RR: 20 (28 May 2020 14:15) (18 - 20)  SpO2: 97% (28 May 2020 04:56) (97% - 98%)      PHYSICAL EXAM:    Constitutional: NAD, awake and alert, well-developed  HEENT: PERR, EOMI, Normal Hearing, MMM  Neck: Soft and supple  Respiratory: Breath sounds are clear bilaterally, No wheezing, rales or rhonchi  Cardiovascular: S1 and S2, regular rate and rhythm, no Murmurs, gallops or rubs  Gastrointestinal: Bowel Sounds present, soft, nontender, nondistended, no guarding, no rebound  Extremities: No peripheral edema  Neurological: A/O x 3, no focal deficits in my limited exam    MEDICATIONS  (STANDING):  dextrose 5%. 1000 milliLiter(s) (50 mL/Hr) IV Continuous <Continuous>  dextrose 50% Injectable 12.5 Gram(s) IV Push once  dextrose 50% Injectable 25 Gram(s) IV Push once  dextrose 50% Injectable 25 Gram(s) IV Push once  epoetin-shakila-epbx (RETACRIT) Injectable 89049 Unit(s) SubCutaneous every 7 days  famotidine    Tablet 20 milliGRAM(s) Oral daily  insulin lispro (HumaLOG) corrective regimen sliding scale   SubCutaneous three times a day before meals  levothyroxine 75 MICROGram(s) Oral daily  melatonin 5 milliGRAM(s) Oral at bedtime  metoprolol tartrate 25 milliGRAM(s) Oral two times a day  pantoprazole    Tablet 40 milliGRAM(s) Oral before breakfast  sevelamer carbonate 800 milliGRAM(s) Oral three times a day  tamsulosin 0.4 milliGRAM(s) Oral at bedtime    MEDICATIONS  (PRN):  dextrose 40% Gel 15 Gram(s) Oral once PRN Blood Glucose LESS THAN 70 milliGRAM(s)/deciliter  glucagon  Injectable 1 milliGRAM(s) IntraMuscular once PRN Glucose LESS THAN 70 milligrams/deciliter                              10.3   8.03  )-----------( 169      ( 28 May 2020 08:31 )             32.4     28 May 2020 08:31    128    |  94     |  60     ----------------------------<  99     3.8     |  22     |  6.79     Ca    8.2        28 May 2020 08:31  Phos  5.2       28 May 2020 08:31    TPro  x      /  Alb  2.3    /  TBili  x      /  DBili  x      /  AST  x      /  ALT  x      /  AlkPhos  x      28 May 2020 08:31    LIVER FUNCTIONS - ( 28 May 2020 08:31 )  Alb: 2.3 g/dL / Pro: x     / ALK PHOS: x     / ALT: x     / AST: x     / GGT: x             CAPILLARY BLOOD GLUCOSE      POCT Blood Glucose.: 128 mg/dL (28 May 2020 09:43)  POCT Blood Glucose.: 134 mg/dL (27 May 2020 21:56)          Assessment and Plan:  84 yo male with Hx. 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 Found to have black stool concerning for acute GI bleed.    Acute blood loss anemia / GI bleed / concerning recurrent AVM bleed:  S/P enteroscopy: jejunal bleeding AVM, cauterized  -s/p 1u PRBC  -Monitor H+H, stable  -protonix/pepcid  -hold aspirin and eliquis  -GI eval appreciated     COVID 19 infection: Asymptomatic  -chest xray no significant findings, slight left effusion  -Monitor clinically    ESRD:  -c/w HD  -epo    Chronic afib:  -hold aspirin and eliquis in setting of GI bleed  -cardio eval appreciated    DM2:  -A1c 5.2  -stop RAISS  -FS testing BID  -EPO      VTEP:   -venodynes    Dispo:  -monitor for bleeding   -PT eval for possible rehab

## 2020-05-29 ENCOUNTER — TRANSCRIPTION ENCOUNTER (OUTPATIENT)
Age: 85
End: 2020-05-29

## 2020-05-29 LAB — SARS-COV-2 RNA SPEC QL NAA+PROBE: DETECTED

## 2020-05-29 PROCEDURE — 99232 SBSQ HOSP IP/OBS MODERATE 35: CPT

## 2020-05-29 RX ADMIN — SEVELAMER CARBONATE 800 MILLIGRAM(S): 2400 POWDER, FOR SUSPENSION ORAL at 12:06

## 2020-05-29 RX ADMIN — SEVELAMER CARBONATE 800 MILLIGRAM(S): 2400 POWDER, FOR SUSPENSION ORAL at 21:47

## 2020-05-29 RX ADMIN — TAMSULOSIN HYDROCHLORIDE 0.4 MILLIGRAM(S): 0.4 CAPSULE ORAL at 21:47

## 2020-05-29 RX ADMIN — Medication 5 MILLIGRAM(S): at 21:47

## 2020-05-29 RX ADMIN — PANTOPRAZOLE SODIUM 40 MILLIGRAM(S): 20 TABLET, DELAYED RELEASE ORAL at 05:01

## 2020-05-29 RX ADMIN — Medication 25 MILLIGRAM(S): at 16:57

## 2020-05-29 RX ADMIN — FAMOTIDINE 20 MILLIGRAM(S): 10 INJECTION INTRAVENOUS at 09:04

## 2020-05-29 RX ADMIN — SEVELAMER CARBONATE 800 MILLIGRAM(S): 2400 POWDER, FOR SUSPENSION ORAL at 05:01

## 2020-05-29 RX ADMIN — Medication 75 MICROGRAM(S): at 05:01

## 2020-05-29 NOTE — PHYSICAL THERAPY INITIAL EVALUATION ADULT - PERTINENT HX OF CURRENT PROBLEM, REHAB EVAL
84 yo M admitted 05/23 complaining of black stool and tested heme positive in the ED. Recent admit with GIB, He had EGD for anemia  and found with AVM which was clipped. He was discharged home on  5/15/20.  On 5/18/20 he developed low grade fever. His daughter and son in law tested positive for COVID 19 and were self  isolating in basement. He tested positive for COVID 19 on 5/19/20.

## 2020-05-29 NOTE — DISCHARGE NOTE NURSING/CASE MANAGEMENT/SOCIAL WORK - PATIENT PORTAL LINK FT
You can access the FollowMyHealth Patient Portal offered by [facility] by registering at the following website: [email or web address]. By joining [employer]’s FollowMyHealth portal, you will also be able to view your health information using other applications (apps) compatible with our system.

## 2020-05-29 NOTE — PROGRESS NOTE ADULT - SUBJECTIVE AND OBJECTIVE BOX
NEPHROLOGY INTERVAL HPI/OVERNIGHT EVENTS:  05-29-20 @ 15:16  doing well, no new c/o   wants to go home      MEDICATIONS  (STANDING):  epoetin-shakila-epbx (RETACRIT) Injectable 99836 Unit(s) SubCutaneous every 7 days  famotidine    Tablet 20 milliGRAM(s) Oral daily  levothyroxine 75 MICROGram(s) Oral daily  melatonin 5 milliGRAM(s) Oral at bedtime  metoprolol tartrate 25 milliGRAM(s) Oral two times a day  pantoprazole    Tablet 40 milliGRAM(s) Oral before breakfast  sevelamer carbonate 800 milliGRAM(s) Oral three times a day  tamsulosin 0.4 milliGRAM(s) Oral at bedtime    MEDICATIONS  (PRN):  acetaminophen   Tablet .. 650 milliGRAM(s) Oral every 6 hours PRN Mild Pain (1 - 3)  ondansetron Injectable 4 milliGRAM(s) IV Push once PRN Nausea and/or Vomiting  oxycodone    5 mG/acetaminophen 325 mG 1 Tablet(s) Oral every 6 hours PRN Moderate Pain (4 - 6)  oxyCODONE    IR 5 milliGRAM(s) Oral once PRN Moderate Pain (4 - 6)      Allergies    No Known Allergies    Intolerances        I&O's Detail    28 May 2020 07:01  -  29 May 2020 07:00  --------------------------------------------------------  IN:  Total IN: 0 mL    OUT:    Other: 3000 mL  Total OUT: 3000 mL    Total NET: -3000 mL      Vital Signs Last 24 Hrs  T(C): 37 (29 May 2020 11:19), Max: 37.7 (28 May 2020 21:24)  T(F): 98.6 (29 May 2020 11:19), Max: 99.9 (28 May 2020 21:24)  HR: 81 (29 May 2020 11:19) (81 - 105)  BP: 111/67 (29 May 2020 11:19) (106/58 - 124/70)  BP(mean): --  RR: 18 (29 May 2020 11:19) (18 - 19)  SpO2: 92% (29 May 2020 11:19) (92% - 97%)  Daily     Daily     PHYSICAL EXAM:  General: alert. awake Ox3  HEENT: MMM  CV: s1s2 rrr  LUNGS: B/L CTA  EXT: no edema    LABS:                        10.3   8.03  )-----------( 169      ( 28 May 2020 08:31 )             32.4     05-28    128<L>  |  94<L>  |  60<H>  ----------------------------<  99  3.8   |  22  |  6.79<H>    Ca    8.2<L>      28 May 2020 08:31  Phos  5.2     05-28    TPro  x   /  Alb  2.3<L>  /  TBili  x   /  DBili  x   /  AST  x   /  ALT  x   /  AlkPhos  x   05-28

## 2020-05-29 NOTE — PHYSICAL THERAPY INITIAL EVALUATION ADULT - DIAGNOSIS, PT EVAL
Acute blood loss anemia / GI bleed / concerning recurrent AVM bleed :s/p 1u PRBC.  COVID 19 infection: Asymptomatic.

## 2020-05-29 NOTE — PROGRESS NOTE ADULT - ASSESSMENT
86 yo man with ESRD post recent hosp for GIB now positive for COVID 19.  Admitted due to significant drop in HGB again.  --ESRD : Hd today and continue TIW-TTS schedule.  --GIB/Anemia  : transfuse 2 units PRBC today.  Continue JARED.  --COVID 19 : supportive care.    D/w ED MD and pt's son.    5/25 SY  --ESRD : continue TIW HD TTS.  --GIB : GI follow up appreciated.  Awaiting intervention. Possible enteroscopy  --A FIB : a/c on hold due to recurrent GIB.  --COVID 19 : diffuse body ache.   Start pain meds.    5/26 SY  --ESRD : HD order and tx reviewed.  Tolerating tx well.  --GIB : post small bowel enteroscopy.  Tolerated procedure well.     HGB holding steady.  --COVID 19 : continue pain meds for body ache.  --A FIB : a/c on hold for GIB.    5/27 SY  --ESRD : Continue TIW HD.   --GIB : Post Enteroscopy,  Bleeding Angioectasia in Jejunum.  HGB stable.  --A FIB : maintain off a/c due to recurrent GIB.  --COVID 19 : continue supportive care.  --D/c planning post HD tomorrow.    5/28 SY  --ESRD : HD order and tx reviewed.  Tolerating tx well.  --GIB : as above : stable HGB.  --A FIB : No a/c due to GIB.  --COVID 19 : supportive care. change in d/c planning as family unable to care for pt as they are struggling with COVID 19.    5/29 MK   - ESRD TTS for hd in am   - GIB h/h stable fu trend of hgb     s/p bleeding angioectasia in jejunum   - anemia epo with hd   - afib off ac  await dc home vs rehab, family members with COVID +

## 2020-05-29 NOTE — PROGRESS NOTE ADULT - SUBJECTIVE AND OBJECTIVE BOX
Patient is a 85y old  Male who presents with a chief complaint of Anemia, COVID 19 infection (29 May 2020 15:16)      HPI:  HPI: The patient is an 86 yo male with Hx. 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 He had EGD for anemia  and found with AVM which was clipped. He was discharged home on  5/15/20.   On 5/18/20 he developed low grade fever. His daughter and son in low tested positive for COVID 19 and were self  isolating in basement. He tested tested positive for COVID 19 on 519/20. He was dialysed on 5/21/20 at  [facility] in isolation. He had nephrology consult  in the ED. The patient was found with low Hg. of 7 and was also complaining of black stool and tested heme positive in the ED. Referred for admission for GI bleed.    shawn diet andneg abd pain  mild reflux but stable  PMHx: DM ESKD, Atrial fib. on Eliquis,  HTN, Hypthyroid , BPH,  cutaneous T cell lymphoma,     PSHx: neg    Family Hx: not available.    Social Hx.: not smoking, no alcohol use      Lab:                       7.0    4.62  )-----------( 130      ( 23 May 2020 11:48 )             22.3       05-23    138  |  102  |  66<H>  ----------------------------<  104<H>  3.9   |  26  |  6.82<H>    Ca    8.4<L>      23 May 2020 11:48  Phos  4.5     05-23  Mg     2.2     05-23 (23 May 2020 20:31)      PAST MEDICAL & SURGICAL HISTORY:  Afib  TIA (transient ischemic attack)  CAD (coronary artery disease)  OAB (overactive bladder)  Gout  LBBB (left bundle branch block)  CHF (congestive heart failure)  Aortic valve stenosis  Squamous cell carcinoma of skin  Pleural effusion on left  SANG (obstructive sleep apnea)  ESRD on dialysis  BPH (benign prostatic hyperplasia)  Cutaneous T-cell lymphoma  Hypothyroid  Hypercholesteremia  DM (diabetes mellitus)  HTN (hypertension)  H/O eye surgery  S/P coronary artery stent placement  S/P TAVR (transcatheter aortic valve replacement)  H/O hernia repair      MEDICATIONS  (STANDING):  epoetin-shakila-epbx (RETACRIT) Injectable 63246 Unit(s) SubCutaneous every 7 days  famotidine    Tablet 20 milliGRAM(s) Oral daily  levothyroxine 75 MICROGram(s) Oral daily  melatonin 5 milliGRAM(s) Oral at bedtime  metoprolol tartrate 25 milliGRAM(s) Oral two times a day  pantoprazole    Tablet 40 milliGRAM(s) Oral before breakfast  sevelamer carbonate 800 milliGRAM(s) Oral three times a day  tamsulosin 0.4 milliGRAM(s) Oral at bedtime    MEDICATIONS  (PRN):  acetaminophen   Tablet .. 650 milliGRAM(s) Oral every 6 hours PRN Mild Pain (1 - 3)  ondansetron Injectable 4 milliGRAM(s) IV Push once PRN Nausea and/or Vomiting  oxycodone    5 mG/acetaminophen 325 mG 1 Tablet(s) Oral every 6 hours PRN Moderate Pain (4 - 6)  oxyCODONE    IR 5 milliGRAM(s) Oral once PRN Moderate Pain (4 - 6)      Allergies    No Known Allergies    Intolerances        SOCIAL HISTORY:    FAMILY HISTORY:  No pertinent family history in first degree relatives      REVIEW OF SYSTEMS:    CONSTITUTIONAL: No weakness, fevers or chills  EYES/ENT: No visual changes;  No vertigo or throat pain   NECK: No pain or stiffness  RESPIRATORY: No cough, wheezing, hemoptysis; No shortness of breath  CARDIOVASCULAR: No chest pain or palpitations  GENITOURINARY: No dysuria, frequency or hematuria  NEUROLOGICAL: No numbness or weakness  SKIN: No itching, burning, rashes, or lesions   All other review of systems is negative unless indicated above.    Vital Signs Last 24 Hrs  T(C): 37 (29 May 2020 11:19), Max: 37.7 (28 May 2020 21:24)  T(F): 98.6 (29 May 2020 11:19), Max: 99.9 (28 May 2020 21:24)  HR: 81 (29 May 2020 11:19) (81 - 105)  BP: 111/67 (29 May 2020 11:19) (106/58 - 124/70)  BP(mean): --  RR: 18 (29 May 2020 11:19) (18 - 19)  SpO2: 92% (29 May 2020 11:19) (92% - 97%)        LABS:  CBC Full  -  ( 28 May 2020 08:31 )  WBC Count : 8.03 K/uL  RBC Count : 3.48 M/uL  Hemoglobin : 10.3 g/dL  Hematocrit : 32.4 %  Platelet Count - Automated : 169 K/uL  Mean Cell Volume : 93.1 fl  Mean Cell Hemoglobin : 29.6 pg  Mean Cell Hemoglobin Concentration : 31.8 gm/dL  Auto Neutrophil # : x  Auto Lymphocyte # : x  Auto Monocyte # : x  Auto Eosinophil # : x  Auto Basophil # : x  Auto Neutrophil % : x  Auto Lymphocyte % : x  Auto Monocyte % : x  Auto Eosinophil % : x  Auto Basophil % : x    05-28    128<L>  |  94<L>  |  60<H>  ----------------------------<  99  3.8   |  22  |  6.79<H>    Ca    8.2<L>      28 May 2020 08:31  Phos  5.2     05-28    TPro  x   /  Alb  2.3<L>  /  TBili  x   /  DBili  x   /  AST  x   /  ALT  x   /  AlkPhos  x   05-28        05-23 @ 11:48  Hep A Igm Nonreact  Hep A total ab, IgA and M --  Hep B core Ab total --  Hep B core IgM Nonreact  Hep B DNA PCR --  Hep BSAg --  Hep BSAb --  Hep BSAb --  HCV Ab --  HCV RNA Log --  HCV RNA interp --                RADIOLOGY & ADDITIONAL STUDIES:

## 2020-05-29 NOTE — PROGRESS NOTE ADULT - ASSESSMENT
84 yo male with Hx. of end stage CKD on hemodialysis and recent GI bleed, who was sent to the ED from [facility] because of low grade temp and tested positive for COVID 19. Found to have black stool concerning for acute GI bleed.    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  No bleeding from site of gastric AVMs noted and area appears to be healing well.  Patient at high risk of having further AVMs in the small intestine.  Discussed with cardiology and family regarding increased risk of bleeding as well as increased risk of cardioembolic events.  For now, would hold anticoagulation and they are considering a watchman procedure.  Discussed with cardiology, they will arrange.  Risk of further AVMs in the small intestine also discussed.  Importance of evaluation and follow-up for this with a PillCam study also reviewed.  Would perform CT enterography. ordered      History of A. fib as well as a stroke.  Patient has an outpatient gastroenterologist and is considering following up with him.  When discussed with daughter that patient has had gastric intestinal metaplasia, she states that patient will follow-up with her own gastroenterologist      He had a colonoscopy last year however, results of this are not available to us.  We have requested this but have not received it.  Per daughter, this colonoscopy was negative.

## 2020-05-30 LAB
ALBUMIN SERPL ELPH-MCNC: 2.2 G/DL — LOW (ref 3.3–5)
ANION GAP SERPL CALC-SCNC: 10 MMOL/L — SIGNIFICANT CHANGE UP (ref 5–17)
BUN SERPL-MCNC: 56 MG/DL — HIGH (ref 7–23)
CALCIUM SERPL-MCNC: 8.3 MG/DL — LOW (ref 8.5–10.1)
CHLORIDE SERPL-SCNC: 93 MMOL/L — LOW (ref 96–108)
CO2 SERPL-SCNC: 24 MMOL/L — SIGNIFICANT CHANGE UP (ref 22–31)
CREAT SERPL-MCNC: 6.91 MG/DL — HIGH (ref 0.5–1.3)
GLUCOSE SERPL-MCNC: 95 MG/DL — SIGNIFICANT CHANGE UP (ref 70–99)
HCT VFR BLD CALC: 28.7 % — LOW (ref 39–50)
HGB BLD-MCNC: 9.4 G/DL — LOW (ref 13–17)
MCHC RBC-ENTMCNC: 29.7 PG — SIGNIFICANT CHANGE UP (ref 27–34)
MCHC RBC-ENTMCNC: 32.8 GM/DL — SIGNIFICANT CHANGE UP (ref 32–36)
MCV RBC AUTO: 90.5 FL — SIGNIFICANT CHANGE UP (ref 80–100)
PHOSPHATE SERPL-MCNC: 3.6 MG/DL — SIGNIFICANT CHANGE UP (ref 2.5–4.5)
PLATELET # BLD AUTO: 185 K/UL — SIGNIFICANT CHANGE UP (ref 150–400)
POTASSIUM SERPL-MCNC: 3.7 MMOL/L — SIGNIFICANT CHANGE UP (ref 3.5–5.3)
POTASSIUM SERPL-SCNC: 3.7 MMOL/L — SIGNIFICANT CHANGE UP (ref 3.5–5.3)
RBC # BLD: 3.17 M/UL — LOW (ref 4.2–5.8)
RBC # FLD: 18.1 % — HIGH (ref 10.3–14.5)
SODIUM SERPL-SCNC: 127 MMOL/L — LOW (ref 135–145)
WBC # BLD: 7.5 K/UL — SIGNIFICANT CHANGE UP (ref 3.8–10.5)
WBC # FLD AUTO: 7.5 K/UL — SIGNIFICANT CHANGE UP (ref 3.8–10.5)

## 2020-05-30 PROCEDURE — 99232 SBSQ HOSP IP/OBS MODERATE 35: CPT

## 2020-05-30 PROCEDURE — 74177 CT ABD & PELVIS W/CONTRAST: CPT | Mod: 26

## 2020-05-30 RX ORDER — HEPARIN SODIUM 5000 [USP'U]/ML
1 INJECTION INTRAVENOUS; SUBCUTANEOUS ONCE
Refills: 0 | Status: COMPLETED | OUTPATIENT
Start: 2020-05-30 | End: 2020-05-30

## 2020-05-30 RX ADMIN — Medication 25 MILLIGRAM(S): at 06:15

## 2020-05-30 RX ADMIN — HEPARIN SODIUM 1 DOSE(S): 5000 INJECTION INTRAVENOUS; SUBCUTANEOUS at 19:53

## 2020-05-30 RX ADMIN — SEVELAMER CARBONATE 800 MILLIGRAM(S): 2400 POWDER, FOR SUSPENSION ORAL at 06:15

## 2020-05-30 RX ADMIN — SEVELAMER CARBONATE 800 MILLIGRAM(S): 2400 POWDER, FOR SUSPENSION ORAL at 21:44

## 2020-05-30 RX ADMIN — TAMSULOSIN HYDROCHLORIDE 0.4 MILLIGRAM(S): 0.4 CAPSULE ORAL at 21:44

## 2020-05-30 RX ADMIN — FAMOTIDINE 20 MILLIGRAM(S): 10 INJECTION INTRAVENOUS at 21:44

## 2020-05-30 RX ADMIN — OXYCODONE AND ACETAMINOPHEN 1 TABLET(S): 5; 325 TABLET ORAL at 18:11

## 2020-05-30 RX ADMIN — HEPARIN SODIUM 1 DOSE(S): 5000 INJECTION INTRAVENOUS; SUBCUTANEOUS at 19:30

## 2020-05-30 RX ADMIN — Medication 5 MILLIGRAM(S): at 21:44

## 2020-05-30 RX ADMIN — Medication 75 MICROGRAM(S): at 06:15

## 2020-05-30 NOTE — PROGRESS NOTE ADULT - SUBJECTIVE AND OBJECTIVE BOX
CC: Anemia History of Present Illness: 	  HPI: The patient is an 84 yo male with Hx. 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 He had EGD for anemia  and found with AVM which was clipped. He was discharged home on  5/15/20.   On 5/18/20 he developed low grade fever. His daughter and son in low tested positive for COVID 19 and were self  isolating in basement. He tested tested positive for COVID 19 on 519/20. He was dialysed on 5/21/20 at  [facility] in isolation. He had nephrology consult  in the ED. The patient was found with low Hg. of 7 and was also complaining of black stool and tested heme positive in the ED. Referred for admission for GI bleed.    5/24: Sitting in chair, comfortable, having black stool.  No fever, chills, HD stable.  5/25: Sitting in chair, HD stable, awaiting GI intervention.  05/26/20: Patient seen and examined. Had HD and enteroscopy today. He denies any new complaints.   05/27/20: Patient seen and examined. Sitting in a chair.   05/28/20: Patient seen and examined. Family now requesting rehab placement. HD in progress.   05/29/20: Patient seen and examined. No new issues.   05/30/20: Patient seen and examined. Had HD in am, tolerated well. No new issues.     REVIEW OF SYSTEMS: All other review of systems is negative unless indicated above.    Vital Signs Last 24 Hrs  T(C): 36.7 (30 May 2020 10:56), Max: 37.1 (29 May 2020 21:43)  T(F): 98.1 (30 May 2020 10:56), Max: 98.7 (29 May 2020 21:43)  HR: 85 (30 May 2020 10:56) (85 - 105)  BP: 107/54 (30 May 2020 11:00) (82/51 - 124/47)  BP(mean): --  RR: 17 (30 May 2020 10:56) (17 - 17)  SpO2: 97% (30 May 2020 10:56) (97% - 100%)      PHYSICAL EXAM:    Constitutional: NAD, awake and alert, well-developed  HEENT: PERR, EOMI, Normal Hearing, MMM  Neck: Soft and supple  Respiratory: Breath sounds are clear bilaterally, No wheezing, rales or rhonchi  Cardiovascular: S1 and S2, regular rate and rhythm, no Murmurs, gallops or rubs  Gastrointestinal: Bowel Sounds present, soft, nontender, nondistended, no guarding, no rebound  Extremities: No peripheral edema  Neurological: A/O x 3, no focal deficits in my limited exam    MEDICATIONS  (STANDING):  dextrose 5%. 1000 milliLiter(s) (50 mL/Hr) IV Continuous <Continuous>  dextrose 50% Injectable 12.5 Gram(s) IV Push once  dextrose 50% Injectable 25 Gram(s) IV Push once  dextrose 50% Injectable 25 Gram(s) IV Push once  epoetin-shakila-epbx (RETACRIT) Injectable 12718 Unit(s) SubCutaneous every 7 days  famotidine    Tablet 20 milliGRAM(s) Oral daily  insulin lispro (HumaLOG) corrective regimen sliding scale   SubCutaneous three times a day before meals  levothyroxine 75 MICROGram(s) Oral daily  melatonin 5 milliGRAM(s) Oral at bedtime  metoprolol tartrate 25 milliGRAM(s) Oral two times a day  pantoprazole    Tablet 40 milliGRAM(s) Oral before breakfast  sevelamer carbonate 800 milliGRAM(s) Oral three times a day  tamsulosin 0.4 milliGRAM(s) Oral at bedtime    MEDICATIONS  (PRN):  dextrose 40% Gel 15 Gram(s) Oral once PRN Blood Glucose LESS THAN 70 milliGRAM(s)/deciliter  glucagon  Injectable 1 milliGRAM(s) IntraMuscular once PRN Glucose LESS THAN 70 milligrams/deciliter                              10.3   8.03  )-----------( 169      ( 28 May 2020 08:31 )             32.4     28 May 2020 08:31    128    |  94     |  60     ----------------------------<  99     3.8     |  22     |  6.79     Ca    8.2        28 May 2020 08:31  Phos  5.2       28 May 2020 08:31    TPro  x      /  Alb  2.3    /  TBili  x      /  DBili  x      /  AST  x      /  ALT  x      /  AlkPhos  x      28 May 2020 08:31    LIVER FUNCTIONS - ( 28 May 2020 08:31 )  Alb: 2.3 g/dL / Pro: x     / ALK PHOS: x     / ALT: x     / AST: x     / GGT: x             CAPILLARY BLOOD GLUCOSE      POCT Blood Glucose.: 128 mg/dL (28 May 2020 09:43)  POCT Blood Glucose.: 134 mg/dL (27 May 2020 21:56)          Assessment and Plan:  84 yo male with Hx. 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 Found to have black stool concerning for acute GI bleed.    Acute blood loss anemia / GI bleed / concerning recurrent AVM bleed:  S/P enteroscopy: jejunal bleeding AVM, cauterized  -s/p 1u PRBC  -Monitor H+H, stable  -protonix/pepcid  -hold aspirin and eliquis  -GI eval appreciated     COVID 19 infection: Asymptomatic  -chest xray no significant findings, slight left effusion  -Monitor clinically    ESRD:  -c/w HD  -epo    Chronic afib:  -hold aspirin and eliquis in setting of GI bleed  -cardio eval appreciated    DM2:  -A1c 5.2  -FS testing BID  -EPO      VTEP:   -venodynes    Dispo:  Home in 1 or 2 days time. Possibly tomorrow  Kids are also with COVID positive

## 2020-05-30 NOTE — PROGRESS NOTE ADULT - SUBJECTIVE AND OBJECTIVE BOX
Patient is a 85y Male who reports no complaints overnight. Seen with start of HD      MEDICATIONS  (STANDING):  epoetin-shakila-epbx (RETACRIT) Injectable 49038 Unit(s) SubCutaneous every 7 days  famotidine    Tablet 20 milliGRAM(s) Oral daily  heparin Lock (1,000 Units/mL) Injectable 1 Dose(s) Catheter once  heparin Lock (1,000 Units/mL) Injectable 1 Dose(s) Catheter once  levothyroxine 75 MICROGram(s) Oral daily  melatonin 5 milliGRAM(s) Oral at bedtime  metoprolol tartrate 25 milliGRAM(s) Oral two times a day  pantoprazole    Tablet 40 milliGRAM(s) Oral before breakfast  sevelamer carbonate 800 milliGRAM(s) Oral three times a day  tamsulosin 0.4 milliGRAM(s) Oral at bedtime    MEDICATIONS  (PRN):  acetaminophen   Tablet .. 650 milliGRAM(s) Oral every 6 hours PRN Mild Pain (1 - 3)  ondansetron Injectable 4 milliGRAM(s) IV Push once PRN Nausea and/or Vomiting  oxycodone    5 mG/acetaminophen 325 mG 1 Tablet(s) Oral every 6 hours PRN Moderate Pain (4 - 6)  oxyCODONE    IR 5 milliGRAM(s) Oral once PRN Moderate Pain (4 - 6)        T(C): , Max: 37.1 (05-29-20 @ 21:43)  T(F): , Max: 98.7 (05-29-20 @ 21:43)  HR: 85 (05-30-20 @ 10:56)  BP: 107/54 (05-30-20 @ 11:00)  BP(mean): --  RR: 17 (05-30-20 @ 10:56)  SpO2: 97% (05-30-20 @ 10:56)  Wt(kg): --        PHYSICAL EXAM: CP    Constitutional: NAD, frail    MMM  Cardiovascular: S1 and S2  alert          LABS:    28 May 2020 08:31    128    |  94     |  60     ----------------------------<  99     3.8     |  22     |  6.79     Ca    8.2        28 May 2020 08:31  Phos  5.2       28 May 2020 08:31    TPro  x      /  Alb  2.3    /  TBili  x      /  DBili  x      /  AST  x      /  ALT  x      /  AlkPhos  x      28 May 2020 08:31          Urine Studies:          RADIOLOGY & ADDITIONAL STUDIES:

## 2020-05-31 ENCOUNTER — TRANSCRIPTION ENCOUNTER (OUTPATIENT)
Age: 85
End: 2020-05-31

## 2020-05-31 PROCEDURE — 99232 SBSQ HOSP IP/OBS MODERATE 35: CPT

## 2020-05-31 RX ADMIN — Medication 25 MILLIGRAM(S): at 21:42

## 2020-05-31 RX ADMIN — FAMOTIDINE 20 MILLIGRAM(S): 10 INJECTION INTRAVENOUS at 11:10

## 2020-05-31 RX ADMIN — SEVELAMER CARBONATE 800 MILLIGRAM(S): 2400 POWDER, FOR SUSPENSION ORAL at 21:42

## 2020-05-31 RX ADMIN — SEVELAMER CARBONATE 800 MILLIGRAM(S): 2400 POWDER, FOR SUSPENSION ORAL at 12:03

## 2020-05-31 RX ADMIN — SEVELAMER CARBONATE 800 MILLIGRAM(S): 2400 POWDER, FOR SUSPENSION ORAL at 06:20

## 2020-05-31 RX ADMIN — TAMSULOSIN HYDROCHLORIDE 0.4 MILLIGRAM(S): 0.4 CAPSULE ORAL at 21:42

## 2020-05-31 RX ADMIN — ERYTHROPOIETIN 20000 UNIT(S): 10000 INJECTION, SOLUTION INTRAVENOUS; SUBCUTANEOUS at 11:10

## 2020-05-31 RX ADMIN — Medication 5 MILLIGRAM(S): at 21:42

## 2020-05-31 RX ADMIN — PANTOPRAZOLE SODIUM 40 MILLIGRAM(S): 20 TABLET, DELAYED RELEASE ORAL at 06:20

## 2020-05-31 RX ADMIN — Medication 75 MICROGRAM(S): at 06:20

## 2020-05-31 NOTE — DISCHARGE NOTE PROVIDER - NSDCMRMEDTOKEN_GEN_ALL_CORE_FT
cholecalciferol oral tablet: 1000 unit(s) orally once a day  docusate sodium 100 mg oral capsule: 2 cap(s) orally once a day, As Needed - for congestion  famotidine 20 mg oral tablet: 1 tab(s) orally once a day (at bedtime)  Flomax 0.4 mg oral capsule: 2 cap(s) orally once a day  levothyroxine 75 mcg (0.075 mg) oral tablet: 1 tab(s) orally once a day  meclizine 12.5 mg oral tablet: 1 tab(s) orally once a day  Melatonin 5 mg oral tablet: 1 tab(s) orally once a day (at bedtime)  Metoprolol Tartrate 25 mg oral tablet: 1 tab(s) orally 2 times a day  MiraLax - oral powder for reconstitution: 17 gram(s) orally once a day, As Needed  Nitrostat 0.4 mg sublingual tablet: 1 tab(s) sublingual every 5 minutes as directed - as needed for chest pain  pantoprazole 40 mg oral delayed release tablet: 1 tab(s) orally 2 times a day   sevelamer hydrochloride 800 mg oral tablet: 2 tab(s) orally 3 times a day (with meals)  triamcinolone 0.1% topical cream: Apply topically to affected area   TriCor 145 mg oral tablet: 1 tab(s) orally once a day

## 2020-05-31 NOTE — DISCHARGE NOTE PROVIDER - CARE PROVIDERS DIRECT ADDRESSES
,[email or web address]

## 2020-05-31 NOTE — DISCHARGE NOTE PROVIDER - CARE PROVIDER_API CALL
Charli Ruvalcaba  [facility]  [address] [place] [address]  Phone: [phone]  Fax: [phone]  Follow Up Time:     William Fonseca  GASTROENTEROLOGY  [address] [place] [id number]  Phone: [phone]  Fax: [phone]  Follow Up Time:

## 2020-05-31 NOTE — DISCHARGE NOTE PROVIDER - HOSPITAL COURSE
The patient is an 86 yo male with Hx. 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 He had EGD for anemia  and found with AVM which was clipped. He was discharged home on  5/15/20.     On 5/18/20 he developed low grade fever. His daughter and son in low tested positive for COVID 19 and were self  isolating in basement. He tested tested positive for COVID 19 on 519/20. He was dialysed on 5/21/20 at  [facility] in isolation. He had nephrology consult  in the ED. The patient was found with low Hg. of 7 and was also complaining of black stool and tested heme positive in the ED. Referred for admission for GI bleed.        5/24: Sitting in chair, comfortable, having black stool.  No fever, chills, HD stable.    5/25: Sitting in chair, HD stable, awaiting GI intervention.    05/26/20: Patient seen and examined. Had HD and enteroscopy today. He denies any new complaints.     05/27/20: Patient seen and examined. Sitting in a chair.     05/28/20: Patient seen and examined. Family now requesting rehab placement. HD in progress.     05/29/20: Patient seen and examined. No new issues.     05/30/20: Patient seen and examined. Had HD in am, tolerated well. No new issues.     05/31/20: No issues.             Vital Signs Last 24 Hrs    T(C): 36.9 (31 May 2020 06:10), Max: 36.9 (31 May 2020 06:10)    T(F): 98.4 (31 May 2020 06:10), Max: 98.4 (31 May 2020 06:10)    HR: 67 (31 May 2020 06:10) (63 - 114)    BP: 105/79 (31 May 2020 06:10) (82/51 - 131/72)    BP(mean): --    RR: 21 (31 May 2020 06:10) (17 - 21)    SpO2: 96% (31 May 2020 06:10) (96% - 97%)                PHYSICAL EXAM:        Constitutional: NAD, awake and alert, well-developed    HEENT: PERR, EOMI, Normal Hearing, MMM    Neck: Soft and supple    Respiratory: Breath sounds are clear bilaterally, No wheezing, rales or rhonchi    Cardiovascular: S1 and S2, regular rate and rhythm, no Murmurs, gallops or rubs    Gastrointestinal: Bowel Sounds present, soft, nontender, nondistended, no guarding, no rebound    Extremities: No peripheral edema    Neurological: A/O x 3, no focal deficits in my limited exam                Assessment and Plan:  86 yo male with Hx. 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 Found to have black stool concerning for acute GI bleed.        Acute blood loss anemia / GI bleed / concerning recurrent AVM bleed:    S/P enteroscopy: jejunal bleeding AVM, cauterized    -s/p 1u PRBC    -H+H, stable    -protonix/pepcid    -hold aspirin and eliquis    -GI eval appreciated         COVID 19 infection: Asymptomatic    -chest xray no significant findings, slight left effusion    -Monitor clinically        ESRD:    -c/w HD    -epo        Chronic afib:    -hold aspirin and eliquis in setting of GI bleed    -cardio eval appreciated        DM2:    -A1c 5.2    -FS testing BID        Home today    Spent more than 30 min to prepare the discharge

## 2020-05-31 NOTE — DISCHARGE NOTE PROVIDER - NSDCCPCAREPLAN_GEN_ALL_CORE_FT
PRINCIPAL DISCHARGE DIAGNOSIS  Diagnosis: COVID-19  Assessment and Plan of Treatment: Follow up with PMD      SECONDARY DISCHARGE DIAGNOSES  Diagnosis: Anemia  Assessment and Plan of Treatment: Hold aspirin and eliquis    Diagnosis: ESRD on dialysis  Assessment and Plan of Treatment:

## 2020-06-01 ENCOUNTER — OUTPATIENT (OUTPATIENT)
Dept: OUTPATIENT SERVICES | Facility: HOSPITAL | Age: 85
LOS: 1 days | Discharge: HOME | End: 2020-06-01

## 2020-06-01 VITALS
HEART RATE: 101 BPM | TEMPERATURE: 98 F | OXYGEN SATURATION: 97 % | DIASTOLIC BLOOD PRESSURE: 47 MMHG | WEIGHT: 167.11 LBS | RESPIRATION RATE: 18 BRPM | SYSTOLIC BLOOD PRESSURE: 113 MMHG

## 2020-06-01 DIAGNOSIS — Z98.890 OTHER SPECIFIED POSTPROCEDURAL STATES: Chronic | ICD-10-CM

## 2020-06-01 DIAGNOSIS — Z95.2 PRESENCE OF PROSTHETIC HEART VALVE: Chronic | ICD-10-CM

## 2020-06-01 DIAGNOSIS — Z98.89 OTHER SPECIFIED POSTPROCEDURAL STATES: Chronic | ICD-10-CM

## 2020-06-01 DIAGNOSIS — C85.90 NON-HODGKIN LYMPHOMA, UNSPECIFIED, UNSPECIFIED SITE: ICD-10-CM

## 2020-06-01 DIAGNOSIS — Z95.5 PRESENCE OF CORONARY ANGIOPLASTY IMPLANT AND GRAFT: Chronic | ICD-10-CM

## 2020-06-01 PROCEDURE — 99239 HOSP IP/OBS DSCHRG MGMT >30: CPT

## 2020-06-01 RX ADMIN — Medication 25 MILLIGRAM(S): at 05:14

## 2020-06-01 RX ADMIN — Medication 75 MICROGRAM(S): at 05:14

## 2020-06-01 RX ADMIN — FAMOTIDINE 20 MILLIGRAM(S): 10 INJECTION INTRAVENOUS at 08:23

## 2020-06-01 RX ADMIN — PANTOPRAZOLE SODIUM 40 MILLIGRAM(S): 20 TABLET, DELAYED RELEASE ORAL at 05:14

## 2020-06-01 RX ADMIN — SEVELAMER CARBONATE 800 MILLIGRAM(S): 2400 POWDER, FOR SUSPENSION ORAL at 05:14

## 2020-06-01 NOTE — PROGRESS NOTE ADULT - SUBJECTIVE AND OBJECTIVE BOX
Patient is a 85y old  Male who presents with a chief complaint of Anemia, COVID 19 infection (31 May 2020 10:22)      HPI:  HPI: The patient is an 86 yo male with Hx. 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 He had EGD for anemia  and found with AVM which was clipped. He was discharged home on  5/15/20.   On 5/18/20 he developed low grade fever. His daughter and son in low tested positive for COVID 19 and were self  isolating in basement. He tested tested positive for COVID 19 on 519/20. He was dialysed on 5/21/20 at  [facility] in isolation. He had nephrology consult  in the ED. The patient was found with low Hg. of 7 and was also complaining of black stool and tested heme positive in the ED. Referred for admission for GI bleed.    pt comf and neg bleeding, DW RN      PMHx: DM ESKD, Atrial fib. on Eliquis,  HTN, Hypthyroid , BPH,  cutaneous T cell lymphoma,     PSHx: neg    Family Hx: not available.    Social Hx.: not smoking, no alcohol use        Physical Exam: Vital Signs Last 24 Hrs  T(C): 37 (23 May 2020 19:37), Max: 37 (23 May 2020 10:54)  T(F): 98.6 (23 May 2020 19:37), Max: 98.6 (23 May 2020 10:54)  HR: 90 (23 May 2020 19:37) (76 - 90)  BP: 147/65 (23 May 2020 19:37) (119/61 - 150/79)  BP(mean): 75 (23 May 2020 10:54) (75 - 75)  RR: 20 (23 May 2020 19:37) (18 - 20)  SpO2: 97% (23 May 2020 19:37) (97% - 100%)                     7.0    4.62  )-----------( 130      ( 23 May 2020 11:48 )             22.3       05-23    138  |  102  |  66<H>  ----------------------------<  104<H>  3.9   |  26  |  6.82<H>    Ca    8.4<L>      23 May 2020 11:48  Phos  4.5     05-23  Mg     2.2     05-23 (23 May 2020 20:31)      PAST MEDICAL & SURGICAL HISTORY:  Afib  TIA (transient ischemic attack)  CAD (coronary artery disease)  OAB (overactive bladder)  Gout  LBBB (left bundle branch block)  CHF (congestive heart failure)  Aortic valve stenosis  Squamous cell carcinoma of skin  Pleural effusion on left  SANG (obstructive sleep apnea)  ESRD on dialysis  BPH (benign prostatic hyperplasia)  Cutaneous T-cell lymphoma  Hypothyroid  Hypercholesteremia  DM (diabetes mellitus)  HTN (hypertension)  H/O eye surgery  S/P coronary artery stent placement  S/P TAVR (transcatheter aortic valve replacement)  H/O hernia repair      MEDICATIONS  (STANDING):  epoetin-shakila-epbx (RETACRIT) Injectable 96741 Unit(s) SubCutaneous every 7 days  famotidine    Tablet 20 milliGRAM(s) Oral daily  levothyroxine 75 MICROGram(s) Oral daily  melatonin 5 milliGRAM(s) Oral at bedtime  metoprolol tartrate 25 milliGRAM(s) Oral two times a day  pantoprazole    Tablet 40 milliGRAM(s) Oral before breakfast  sevelamer carbonate 800 milliGRAM(s) Oral three times a day  tamsulosin 0.4 milliGRAM(s) Oral at bedtime    MEDICATIONS  (PRN):  acetaminophen   Tablet .. 650 milliGRAM(s) Oral every 6 hours PRN Mild Pain (1 - 3)  ondansetron Injectable 4 milliGRAM(s) IV Push once PRN Nausea and/or Vomiting  oxycodone    5 mG/acetaminophen 325 mG 1 Tablet(s) Oral every 6 hours PRN Moderate Pain (4 - 6)  oxyCODONE    IR 5 milliGRAM(s) Oral once PRN Moderate Pain (4 - 6)      Allergies    No Known Allergies    Intolerances        SOCIAL HISTORY:NC    FAMILY HISTORY:  No pertinent family history in first degree relatives      REVIEW OF SYSTEMS:    CONSTITUTIONAL: pos weakness, no fevers or chills  EYES/ENT: No visual changes;  No vertigo or throat pain   NECK: No pain or stiffness  RESPIRATORY: No cough, wheezing, hemoptysis; No shortness of breath  CARDIOVASCULAR: No chest pain or palpitations  GENITOURINARY: No dysuria, frequency or hematuria  NEUROLOGICAL: No numbness or weakness  SKIN: No itching, burning, rashes, or lesions   All other review of systems is negative unless indicated above.    Vital Signs Last 24 Hrs  T(C): 36.6 (01 Jun 2020 04:50), Max: 36.6 (01 Jun 2020 04:50)  T(F): 97.8 (01 Jun 2020 04:50), Max: 97.8 (01 Jun 2020 04:50)  HR: 101 (01 Jun 2020 04:50) (79 - 108)  BP: 113/47 (01 Jun 2020 04:50) (92/56 - 122/88)  BP(mean): --  RR: 18 (01 Jun 2020 04:50) (17 - 18)  SpO2: 97% (01 Jun 2020 04:50) (97% - 98%)      LABS:  CBC Full  -  ( 30 May 2020 15:40 )  WBC Count : 7.50 K/uL  RBC Count : 3.17 M/uL  Hemoglobin : 9.4 g/dL  Hematocrit : 28.7 %  Platelet Count - Automated : 185 K/uL  Mean Cell Volume : 90.5 fl  Mean Cell Hemoglobin : 29.7 pg  Mean Cell Hemoglobin Concentration : 32.8 gm/dL  Auto Neutrophil # : x  Auto Lymphocyte # : x  Auto Monocyte # : x  Auto Eosinophil # : x  Auto Basophil # : x  Auto Neutrophil % : x  Auto Lymphocyte % : x  Auto Monocyte % : x  Auto Eosinophil % : x  Auto Basophil % : x    05-30    127<L>  |  93<L>  |  56<H>  ----------------------------<  95  3.7   |  24  |  6.91<H>    Ca    8.3<L>      30 May 2020 15:40  Phos  3.6     05-30    TPro  x   /  Alb  2.2<L>  /  TBili  x   /  DBili  x   /  AST  x   /  ALT  x   /  AlkPhos  x   05-30        05-23 @ 11:48  Hep A Igm Nonreact  Hep A total ab, IgA and M --  Hep B core Ab total --  Hep B core IgM Nonreact  Hep B DNA PCR --  Hep BSAg --  Hep BSAb --  Hep BSAb --  HCV Ab --  HCV RNA Log --  HCV RNA interp --                RADIOLOGY & ADDITIONAL STUDIES:  < from: CT Enterography w/ Oral Cont and w/ IV Cont (05.30.20 @ 10:08) >  EXAM:  CT ENTEROGRAPHY OC IC                            PROCEDURE DATE:  05/30/2020          INTERPRETATION:  CLINICAL INFORMATION: GI bleed with AVM    COMPARISON: 10/23/2014    PROCEDURE:   CT of the Abdomen and Pelvis was performed with intravenous contrast in the late arterial and portal venous phases.  Intravenous contrast: 90 ml Omnipaque 350. 10 ml discarded.  Oral contrast: Volumen was administered.  Sagittal and coronal reformats were performed.    FINDINGS:    LOWER CHEST: Coronary vascular calcification. Aortic valve replacement. Mild cardiomegaly. Trace to small left pleural effusion. Trace right pleural effusion.    LIVER: Within normal limits.  BILE DUCTS: Normal caliber.  GALLBLADDER: Gallstones.  SPLEEN: Within normal limits.  PANCREAS: Within normal limits.  ADRENALS: Within normal limits.  KIDNEYS/URETERS: Kidneys enhance bilaterally and symmetrically without hydronephrosis. Bilateral renal atrophy and renal cortical scarring. Bilateral renal cysts. No intrarenal calculi.    BLADDER: Bladder is collapsed.  REPRODUCTIVE ORGANS: Mildly enlarged prostate gland. No pelvic adenopathy or pelvic free fluid.    BOWEL: No bowel obstruction. Appendix is within normal limits. Colonic diverticulosis without diverticulitis. Radiopaque density within the small bowel in the midabdomen correlate for camera endoscopy versus metallic foreign body. No definite bowel wall thickening or abnormal bowel wall enhancement. No definite bowel vascular abnormality identified. No discrete enhancing lesions within the small or large bowel definitively delineated.  PERITONEUM: No ascites.  VESSELS:  Atherosclerotic changes of the aorta without aneurysmal dilatation.  RETROPERITONEUM: No lymphadenopathy.    ABDOMINAL WALL: Within normal limits.  BONES: Degenerative changes and osteopenia.    IMPRESSION:     No bowel obstruction or gross bowel wall thickening. No abnormal bowel wall enhancement.    Radiopaque density within the lumen of small bowel within the midabdomen may represent acapsular endoscopy versus endoscopic manipulation versus ingested radiopaque foreign body, correlate clinically.    Colonic diverticulosis without diverticulitis.                    KISHA GARCIA M.D., ATTENDING RADIOLOGIST  This document has been electronically signed. May 30 2020 10:31AM                < end of copied text >

## 2020-06-01 NOTE — PROGRESS NOTE ADULT - ASSESSMENT
84 yo male with Hx. 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 Found to have black stool concerning for acute GI bleed.    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  No bleeding from site of gastric AVMs noted and area appears to be healing well.  Patient at high risk of having further AVMs in the small intestine.  Discussed with cardiology and family regarding increased risk of bleeding as well as increased risk of cardioembolic events.   considering a watchman procedure.  Discussed with cardiology, they will arrange.  Risk of further AVMs in the small intestine also discussed.  Importance of evaluation and follow-up for this with a PillCam study also reviewed.  Would perform CT enterography. ordered      History of A. fib as well as a stroke.  Patient has an outpatient gastroenterologist and is considering following up with him.  When discussed with daughter that patient has had gastric intestinal metaplasia, she states that patient will follow-up with her own gastroenterologist      He had a colonoscopy last year however, results of this are not available to us.  We have requested this but have not received it.  Per daughter, this colonoscopy was negative.

## 2020-06-01 NOTE — PROGRESS NOTE ADULT - REASON FOR ADMISSION
Anemia, COVID 19 infection

## 2020-06-04 ENCOUNTER — APPOINTMENT (OUTPATIENT)
Dept: HEMATOLOGY ONCOLOGY | Facility: CLINIC | Age: 85
End: 2020-06-04

## 2020-06-08 DIAGNOSIS — Z99.2 DEPENDENCE ON RENAL DIALYSIS: ICD-10-CM

## 2020-06-08 DIAGNOSIS — U07.1 COVID-19: ICD-10-CM

## 2020-06-08 DIAGNOSIS — E11.22 TYPE 2 DIABETES MELLITUS WITH DIABETIC CHRONIC KIDNEY DISEASE: ICD-10-CM

## 2020-06-08 DIAGNOSIS — N40.0 BENIGN PROSTATIC HYPERPLASIA WITHOUT LOWER URINARY TRACT SYMPTOMS: ICD-10-CM

## 2020-06-08 DIAGNOSIS — D62 ACUTE POSTHEMORRHAGIC ANEMIA: ICD-10-CM

## 2020-06-08 DIAGNOSIS — N18.6 END STAGE RENAL DISEASE: ICD-10-CM

## 2020-06-08 DIAGNOSIS — Z86.73 PERSONAL HISTORY OF TRANSIENT ISCHEMIC ATTACK (TIA), AND CEREBRAL INFARCTION WITHOUT RESIDUAL DEFICITS: ICD-10-CM

## 2020-06-08 DIAGNOSIS — K55.21 ANGIODYSPLASIA OF COLON WITH HEMORRHAGE: ICD-10-CM

## 2020-06-08 DIAGNOSIS — E78.00 PURE HYPERCHOLESTEROLEMIA, UNSPECIFIED: ICD-10-CM

## 2020-06-08 DIAGNOSIS — I13.2 HYPERTENSIVE HEART AND CHRONIC KIDNEY DISEASE WITH HEART FAILURE AND WITH STAGE 5 CHRONIC KIDNEY DISEASE, OR END STAGE RENAL DISEASE: ICD-10-CM

## 2020-06-08 DIAGNOSIS — E03.9 HYPOTHYROIDISM, UNSPECIFIED: ICD-10-CM

## 2020-06-08 DIAGNOSIS — G47.33 OBSTRUCTIVE SLEEP APNEA (ADULT) (PEDIATRIC): ICD-10-CM

## 2020-06-08 DIAGNOSIS — I50.9 HEART FAILURE, UNSPECIFIED: ICD-10-CM

## 2020-06-08 DIAGNOSIS — K44.9 DIAPHRAGMATIC HERNIA WITHOUT OBSTRUCTION OR GANGRENE: ICD-10-CM

## 2020-06-08 DIAGNOSIS — I25.10 ATHEROSCLEROTIC HEART DISEASE OF NATIVE CORONARY ARTERY WITHOUT ANGINA PECTORIS: ICD-10-CM

## 2020-06-08 DIAGNOSIS — I48.20 CHRONIC ATRIAL FIBRILLATION, UNSPECIFIED: ICD-10-CM

## 2020-06-08 DIAGNOSIS — Z79.82 LONG TERM (CURRENT) USE OF ASPIRIN: ICD-10-CM

## 2020-06-08 DIAGNOSIS — M10.9 GOUT, UNSPECIFIED: ICD-10-CM

## 2020-06-08 DIAGNOSIS — I44.7 LEFT BUNDLE-BRANCH BLOCK, UNSPECIFIED: ICD-10-CM

## 2020-06-08 DIAGNOSIS — K22.2 ESOPHAGEAL OBSTRUCTION: ICD-10-CM

## 2020-06-08 DIAGNOSIS — Z79.01 LONG TERM (CURRENT) USE OF ANTICOAGULANTS: ICD-10-CM

## 2020-06-09 ENCOUNTER — INPATIENT (INPATIENT)
Facility: HOSPITAL | Age: 85
LOS: 3 days | Discharge: HOME | DRG: 312 | End: 2020-06-13
Attending: HOSPITALIST | Admitting: INTERNAL MEDICINE
Payer: COMMERCIAL

## 2020-06-09 VITALS
WEIGHT: 160.06 LBS | OXYGEN SATURATION: 99 % | HEIGHT: 63 IN | HEART RATE: 86 BPM | DIASTOLIC BLOOD PRESSURE: 59 MMHG | SYSTOLIC BLOOD PRESSURE: 111 MMHG | RESPIRATION RATE: 18 BRPM | TEMPERATURE: 98 F

## 2020-06-09 DIAGNOSIS — Z98.890 OTHER SPECIFIED POSTPROCEDURAL STATES: Chronic | ICD-10-CM

## 2020-06-09 DIAGNOSIS — Z95.5 PRESENCE OF CORONARY ANGIOPLASTY IMPLANT AND GRAFT: Chronic | ICD-10-CM

## 2020-06-09 DIAGNOSIS — Z98.89 OTHER SPECIFIED POSTPROCEDURAL STATES: Chronic | ICD-10-CM

## 2020-06-09 DIAGNOSIS — Z95.2 PRESENCE OF PROSTHETIC HEART VALVE: Chronic | ICD-10-CM

## 2020-06-09 LAB
ALBUMIN SERPL ELPH-MCNC: 2.9 G/DL — LOW (ref 3.3–5.2)
ALP SERPL-CCNC: 85 U/L — SIGNIFICANT CHANGE UP (ref 40–120)
ALT FLD-CCNC: 27 U/L — SIGNIFICANT CHANGE UP
ANION GAP SERPL CALC-SCNC: 13 MMOL/L — SIGNIFICANT CHANGE UP (ref 5–17)
AST SERPL-CCNC: 63 U/L — HIGH
BILIRUB SERPL-MCNC: 0.5 MG/DL — SIGNIFICANT CHANGE UP (ref 0.4–2)
BUN SERPL-MCNC: 35 MG/DL — HIGH (ref 8–20)
CALCIUM SERPL-MCNC: 8.5 MG/DL — LOW (ref 8.6–10.2)
CHLORIDE SERPL-SCNC: 95 MMOL/L — LOW (ref 98–107)
CO2 SERPL-SCNC: 27 MMOL/L — SIGNIFICANT CHANGE UP (ref 22–29)
CREAT SERPL-MCNC: 4.68 MG/DL — HIGH (ref 0.5–1.3)
GLUCOSE SERPL-MCNC: 141 MG/DL — HIGH (ref 70–99)
HCT VFR BLD CALC: 30 % — LOW (ref 39–50)
HGB BLD-MCNC: 9.4 G/DL — LOW (ref 13–17)
LIDOCAIN IGE QN: 112 U/L — HIGH (ref 22–51)
MAGNESIUM SERPL-MCNC: 2.4 MG/DL — SIGNIFICANT CHANGE UP (ref 1.8–2.6)
MCHC RBC-ENTMCNC: 30.6 PG — SIGNIFICANT CHANGE UP (ref 27–34)
MCHC RBC-ENTMCNC: 31.3 GM/DL — LOW (ref 32–36)
MCV RBC AUTO: 97.7 FL — SIGNIFICANT CHANGE UP (ref 80–100)
NT-PROBNP SERPL-SCNC: HIGH PG/ML (ref 0–300)
PLATELET # BLD AUTO: 133 K/UL — LOW (ref 150–400)
POTASSIUM SERPL-MCNC: 4.4 MMOL/L — SIGNIFICANT CHANGE UP (ref 3.5–5.3)
POTASSIUM SERPL-SCNC: 4.4 MMOL/L — SIGNIFICANT CHANGE UP (ref 3.5–5.3)
PROT SERPL-MCNC: 6.5 G/DL — LOW (ref 6.6–8.7)
RBC # BLD: 3.07 M/UL — LOW (ref 4.2–5.8)
RBC # FLD: 20.2 % — HIGH (ref 10.3–14.5)
SODIUM SERPL-SCNC: 135 MMOL/L — SIGNIFICANT CHANGE UP (ref 135–145)
TROPONIN T SERPL-MCNC: 0.05 NG/ML — SIGNIFICANT CHANGE UP (ref 0–0.06)
WBC # BLD: 6.62 K/UL — SIGNIFICANT CHANGE UP (ref 3.8–10.5)
WBC # FLD AUTO: 6.62 K/UL — SIGNIFICANT CHANGE UP (ref 3.8–10.5)

## 2020-06-09 PROCEDURE — 71045 X-RAY EXAM CHEST 1 VIEW: CPT | Mod: 26

## 2020-06-09 PROCEDURE — 70450 CT HEAD/BRAIN W/O DYE: CPT | Mod: 26

## 2020-06-09 PROCEDURE — 99218: CPT

## 2020-06-09 RX ORDER — TETANUS TOXOID, REDUCED DIPHTHERIA TOXOID AND ACELLULAR PERTUSSIS VACCINE, ADSORBED 5; 2.5; 8; 8; 2.5 [IU]/.5ML; [IU]/.5ML; UG/.5ML; UG/.5ML; UG/.5ML
0.5 SUSPENSION INTRAMUSCULAR ONCE
Refills: 0 | Status: COMPLETED | OUTPATIENT
Start: 2020-06-09 | End: 2020-06-09

## 2020-06-09 RX ORDER — ACETAMINOPHEN 500 MG
650 TABLET ORAL ONCE
Refills: 0 | Status: DISCONTINUED | OUTPATIENT
Start: 2020-06-09 | End: 2020-06-13

## 2020-06-09 RX ADMIN — Medication 650 MILLIGRAM(S): at 22:30

## 2020-06-09 RX ADMIN — TETANUS TOXOID, REDUCED DIPHTHERIA TOXOID AND ACELLULAR PERTUSSIS VACCINE, ADSORBED 0.5 MILLILITER(S): 5; 2.5; 8; 8; 2.5 SUSPENSION INTRAMUSCULAR at 20:55

## 2020-06-09 NOTE — ED CDU PROVIDER INITIAL DAY NOTE - CPE EDP NEURO NORM
Pt comes in with generalized abdominal pain, worsening over the last week. Pt has chronic abdominal pain since May, per pt this feels like her chronic pain, but worse. Pain radiates to her back, some n/v/d as well. Pt uses oxycodone and fentanyl patch for pain control. Alert and oriented, vss.    normal...

## 2020-06-09 NOTE — ED PROVIDER NOTE - PROGRESS NOTE DETAILS
Spoke with daughter Yari Edwards, ([id number]) she states her dad in hospital several weeks ago for GI bleed given 6 units of blood while on Eliquis and ASA for afib. D/c and 5 days later had c/o weakness, went back to hospital found to be COVID +with worsening anemia. Given another 3 units watched for several days, site of bleeding found and clipped and d/c 5 days ago. As per daughter pt has been very weak, tired with significant confusion initally, gradually improved although not back to baseline. States this evenings event pt sitting inrecliner stood up for 5-10 seconds and colapsed fell forward landed on right sided unresponsivr 10 seconds came to quickly. Knew name, recognized family members, knew birthday, knew where he was but could not remember exact year. Family requesting that pt goes to rehab because pt has been weak and confused. Several family members +COVID and have difficulty taking care of him.

## 2020-06-09 NOTE — ED PROVIDER NOTE - SECONDARY DIAGNOSIS.
ESRD on dialysis Injury of head, initial encounter Skin tear of right forearm without complication, initial encounter

## 2020-06-09 NOTE — ED CDU PROVIDER INITIAL DAY NOTE - OBJECTIVE STATEMENT
84y/o M with PMHx of Afib, Aortic valve stenosis, BPH, CAD, CHF, Cutaneous T-cell lymphoma, DM, ESRD on dialysis, Gout, HTN, LBBB, OAB, Squamous cell carcinoma of skin and TIA presents to the ED s/p falling forward and hitting right side of head after standing up from recliner c/o HA which he reports is a burning sensation. According to EMS, fall was witnessed who family who was watching TV with pt when he fell, they tried calling his name and he was unresponsive for ~10 seconds. Pt was recently d/c from [facility] 5 days ago due to GI bleed. Pt was COVID + 2 weeks ago.

## 2020-06-09 NOTE — ED CDU PROVIDER INITIAL DAY NOTE - NSCAREINITIATED _GEN_ER
Pt was using chemical to clean up spill, did not use goggles or gloves and now having difficulty seeing. Juju Quiñones(Attending)

## 2020-06-09 NOTE — ED PROVIDER NOTE - PSH
H/O eye surgery    H/O hernia repair    S/P coronary artery stent placement    S/P TAVR (transcatheter aortic valve replacement)

## 2020-06-09 NOTE — ED ADULT NURSE NOTE - NSIMPLEMENTINTERV_GEN_ALL_ED
Implemented All Fall Risk Interventions:  [place] to call system. Call bell, personal items and telephone within reach. Instruct patient to call for assistance. Room bathroom lighting operational. Non-slip footwear when patient is off stretcher. Physically safe environment: no spills, clutter or unnecessary equipment. Stretcher in lowest position, wheels locked, appropriate side rails in place. Provide visual cue, wrist band, yellow gown, etc. Monitor gait and stability. Monitor for mental status changes and reorient to person, place, and time. Review medications for side effects contributing to fall risk. Reinforce activity limits and safety measures with patient and family.

## 2020-06-09 NOTE — ED PROVIDER NOTE - OBJECTIVE STATEMENT
84y/o M with PMHx of Afib, Aortic valve stenosis, BPH, CAD, CHF, Cutaneous T-cell lymphoma, DM, ESRD on dialysis, Gout, HTN, LBBB, OAB, Squamous cell carcinoma of skin and TIA presents to the ED s/p falling forward and hitting right side of head after standing up from recliner c/o HA which he reports is a burning sensation. According to EMS, fall was witnessed who family who was watching TV with pt when he fell, they tried calling his name and he was unresponsive for ~10 seconds. Pt was recently d/c from [facility] 5 days ago due to GI bleed. Pt was COVID + 2 weeks ago.

## 2020-06-09 NOTE — ED ADULT NURSE REASSESSMENT NOTE - NS ED NURSE REASSESS COMMENT FT1
skin tears clean by MD Quiñones ... wounds redressed and MD Quiñones aware pt c/o pain, will medicated as per orders

## 2020-06-09 NOTE — ED CDU PROVIDER INITIAL DAY NOTE - MEDICAL DECISION MAKING DETAILS
Patient to remain in obs for syncope evaluation. Patient likely with orthostatic episode  however given two recent hospitalizations concern for other underlying disease.

## 2020-06-09 NOTE — ED ADULT TRIAGE NOTE - CHIEF COMPLAINT QUOTE
Witnessed fall from standing.  pt stood up from chair and fell forward hitting head on floor.   Unknown LOC.  Abrasion present to right side forehead.  Pt stopped eliquis 5 days ago for GI bleed.  A+O X 4  Priority CT per Dr. Quiñones.

## 2020-06-09 NOTE — ED PROVIDER NOTE - CARE PLAN
Principal Discharge DX:	Syncope and collapse  Secondary Diagnosis:	ESRD on dialysis  Secondary Diagnosis:	Injury of head, initial encounter  Secondary Diagnosis:	Skin tear of right forearm without complication, initial encounter

## 2020-06-09 NOTE — ED ADULT NURSE NOTE - OBJECTIVE STATEMENT
pt slipped and fell while getting out of recliner chair and fell forward and hit right side of head, pt has abrasion to right forehead and right arm, as per family on phone fall was witnessed and pt was unresponsive for about 10secs, pt stopped Eliquis x5days ago due to GI Bleed and d/c from [facility], pt +covid about 2 weeks ago, resp even and unlabored no distress noted , pt has right chest wall dialysis port goes to dialysis M/W/F, pt slipped and fell while getting out of recliner chair and fell forward and hit right side of head, pt has skin tears to right forehead and right arm, as per family on phone fall was witnessed and pt was unresponsive for about 10secs, pt stopped Eliquis x5days ago due to GI Bleed and d/c from [facility], pt +covid about 2 weeks ago, resp even and unlabored no distress noted , pt has right chest wall dialysis port goes to dialysis M/W/F,

## 2020-06-10 DIAGNOSIS — R55 SYNCOPE AND COLLAPSE: ICD-10-CM

## 2020-06-10 LAB
ALBUMIN SERPL ELPH-MCNC: 2.9 G/DL — LOW (ref 3.3–5.2)
ALP SERPL-CCNC: 77 U/L — SIGNIFICANT CHANGE UP (ref 40–120)
ALT FLD-CCNC: 26 U/L — SIGNIFICANT CHANGE UP
ANION GAP SERPL CALC-SCNC: 10 MMOL/L — SIGNIFICANT CHANGE UP (ref 5–17)
AST SERPL-CCNC: 42 U/L — HIGH
BASOPHILS # BLD AUTO: 0.02 K/UL — SIGNIFICANT CHANGE UP (ref 0–0.2)
BASOPHILS NFR BLD AUTO: 0.3 % — SIGNIFICANT CHANGE UP (ref 0–2)
BILIRUB SERPL-MCNC: 0.4 MG/DL — SIGNIFICANT CHANGE UP (ref 0.4–2)
BLD GP AB SCN SERPL QL: SIGNIFICANT CHANGE UP
BUN SERPL-MCNC: 39 MG/DL — HIGH (ref 8–20)
CALCIUM SERPL-MCNC: 8.5 MG/DL — LOW (ref 8.6–10.2)
CHLORIDE SERPL-SCNC: 96 MMOL/L — LOW (ref 98–107)
CO2 SERPL-SCNC: 28 MMOL/L — SIGNIFICANT CHANGE UP (ref 22–29)
CREAT SERPL-MCNC: 5.38 MG/DL — HIGH (ref 0.5–1.3)
EOSINOPHIL # BLD AUTO: 0.13 K/UL — SIGNIFICANT CHANGE UP (ref 0–0.5)
EOSINOPHIL NFR BLD AUTO: 2.1 % — SIGNIFICANT CHANGE UP (ref 0–6)
GLUCOSE BLDC GLUCOMTR-MCNC: 114 MG/DL — HIGH (ref 70–99)
GLUCOSE BLDC GLUCOMTR-MCNC: 86 MG/DL — SIGNIFICANT CHANGE UP (ref 70–99)
GLUCOSE SERPL-MCNC: 141 MG/DL — HIGH (ref 70–99)
HCT VFR BLD CALC: 30.7 % — LOW (ref 39–50)
HGB BLD-MCNC: 9.4 G/DL — LOW (ref 13–17)
IMM GRANULOCYTES NFR BLD AUTO: 0.6 % — SIGNIFICANT CHANGE UP (ref 0–1.5)
LYMPHOCYTES # BLD AUTO: 0.75 K/UL — LOW (ref 1–3.3)
LYMPHOCYTES # BLD AUTO: 12.1 % — LOW (ref 13–44)
MCHC RBC-ENTMCNC: 30.4 PG — SIGNIFICANT CHANGE UP (ref 27–34)
MCHC RBC-ENTMCNC: 30.6 GM/DL — LOW (ref 32–36)
MCV RBC AUTO: 99.4 FL — SIGNIFICANT CHANGE UP (ref 80–100)
MONOCYTES # BLD AUTO: 0.57 K/UL — SIGNIFICANT CHANGE UP (ref 0–0.9)
MONOCYTES NFR BLD AUTO: 9.2 % — SIGNIFICANT CHANGE UP (ref 2–14)
NEUTROPHILS # BLD AUTO: 4.7 K/UL — SIGNIFICANT CHANGE UP (ref 1.8–7.4)
NEUTROPHILS NFR BLD AUTO: 75.7 % — SIGNIFICANT CHANGE UP (ref 43–77)
PHOSPHATE SERPL-MCNC: 3 MG/DL — SIGNIFICANT CHANGE UP (ref 2.4–4.7)
PLATELET # BLD AUTO: 114 K/UL — LOW (ref 150–400)
POTASSIUM SERPL-MCNC: 4.1 MMOL/L — SIGNIFICANT CHANGE UP (ref 3.5–5.3)
POTASSIUM SERPL-SCNC: 4.1 MMOL/L — SIGNIFICANT CHANGE UP (ref 3.5–5.3)
PROT SERPL-MCNC: 6 G/DL — LOW (ref 6.6–8.7)
RBC # BLD: 3.09 M/UL — LOW (ref 4.2–5.8)
RBC # FLD: 19.9 % — HIGH (ref 10.3–14.5)
SARS-COV-2 RNA SPEC QL NAA+PROBE: DETECTED
SODIUM SERPL-SCNC: 134 MMOL/L — LOW (ref 135–145)
TROPONIN T SERPL-MCNC: 0.05 NG/ML — SIGNIFICANT CHANGE UP (ref 0–0.06)
WBC # BLD: 6.21 K/UL — SIGNIFICANT CHANGE UP (ref 3.8–10.5)
WBC # FLD AUTO: 6.21 K/UL — SIGNIFICANT CHANGE UP (ref 3.8–10.5)

## 2020-06-10 PROCEDURE — 93010 ELECTROCARDIOGRAM REPORT: CPT

## 2020-06-10 PROCEDURE — 99223 1ST HOSP IP/OBS HIGH 75: CPT | Mod: AI

## 2020-06-10 PROCEDURE — 90937 HEMODIALYSIS REPEATED EVAL: CPT

## 2020-06-10 PROCEDURE — 99223 1ST HOSP IP/OBS HIGH 75: CPT | Mod: 25

## 2020-06-10 PROCEDURE — 99217: CPT

## 2020-06-10 RX ORDER — SEVELAMER CARBONATE 2400 MG/1
800 POWDER, FOR SUSPENSION ORAL THREE TIMES A DAY
Refills: 0 | Status: DISCONTINUED | OUTPATIENT
Start: 2020-06-10 | End: 2020-06-13

## 2020-06-10 RX ORDER — DEXTROSE 50 % IN WATER 50 %
25 SYRINGE (ML) INTRAVENOUS ONCE
Refills: 0 | Status: DISCONTINUED | OUTPATIENT
Start: 2020-06-10 | End: 2020-06-11

## 2020-06-10 RX ORDER — INSULIN LISPRO 100/ML
VIAL (ML) SUBCUTANEOUS
Refills: 0 | Status: DISCONTINUED | OUTPATIENT
Start: 2020-06-10 | End: 2020-06-11

## 2020-06-10 RX ORDER — ERYTHROPOIETIN 10000 [IU]/ML
10000 INJECTION, SOLUTION INTRAVENOUS; SUBCUTANEOUS
Refills: 0 | Status: DISCONTINUED | OUTPATIENT
Start: 2020-06-10 | End: 2020-06-13

## 2020-06-10 RX ORDER — LANOLIN ALCOHOL/MO/W.PET/CERES
5 CREAM (GRAM) TOPICAL AT BEDTIME
Refills: 0 | Status: DISCONTINUED | OUTPATIENT
Start: 2020-06-10 | End: 2020-06-10

## 2020-06-10 RX ORDER — MECLIZINE HCL 12.5 MG
1 TABLET ORAL
Qty: 0 | Refills: 0 | DISCHARGE

## 2020-06-10 RX ORDER — TAMSULOSIN HYDROCHLORIDE 0.4 MG/1
0.4 CAPSULE ORAL AT BEDTIME
Refills: 0 | Status: DISCONTINUED | OUTPATIENT
Start: 2020-06-10 | End: 2020-06-13

## 2020-06-10 RX ORDER — POLYETHYLENE GLYCOL 3350 17 G/17G
17 POWDER, FOR SOLUTION ORAL DAILY
Refills: 0 | Status: DISCONTINUED | OUTPATIENT
Start: 2020-06-10 | End: 2020-06-13

## 2020-06-10 RX ORDER — DEXTROSE 50 % IN WATER 50 %
12.5 SYRINGE (ML) INTRAVENOUS ONCE
Refills: 0 | Status: DISCONTINUED | OUTPATIENT
Start: 2020-06-10 | End: 2020-06-11

## 2020-06-10 RX ORDER — METOPROLOL TARTRATE 50 MG
25 TABLET ORAL
Refills: 0 | Status: DISCONTINUED | OUTPATIENT
Start: 2020-06-10 | End: 2020-06-13

## 2020-06-10 RX ORDER — FENOFIBRATE,MICRONIZED 130 MG
145 CAPSULE ORAL DAILY
Refills: 0 | Status: DISCONTINUED | OUTPATIENT
Start: 2020-06-10 | End: 2020-06-13

## 2020-06-10 RX ORDER — FAMOTIDINE 10 MG/ML
20 INJECTION INTRAVENOUS AT BEDTIME
Refills: 0 | Status: DISCONTINUED | OUTPATIENT
Start: 2020-06-10 | End: 2020-06-13

## 2020-06-10 RX ORDER — NITROGLYCERIN 6.5 MG
1 CAPSULE, EXTENDED RELEASE ORAL
Qty: 0 | Refills: 0 | DISCHARGE

## 2020-06-10 RX ORDER — LANOLIN ALCOHOL/MO/W.PET/CERES
5 CREAM (GRAM) TOPICAL AT BEDTIME
Refills: 0 | Status: DISCONTINUED | OUTPATIENT
Start: 2020-06-10 | End: 2020-06-13

## 2020-06-10 RX ORDER — LEVOTHYROXINE SODIUM 125 MCG
75 TABLET ORAL DAILY
Refills: 0 | Status: DISCONTINUED | OUTPATIENT
Start: 2020-06-10 | End: 2020-06-13

## 2020-06-10 RX ORDER — FENOFIBRATE,MICRONIZED 130 MG
1 CAPSULE ORAL
Qty: 0 | Refills: 0 | DISCHARGE

## 2020-06-10 RX ORDER — DEXTROSE 50 % IN WATER 50 %
15 SYRINGE (ML) INTRAVENOUS ONCE
Refills: 0 | Status: DISCONTINUED | OUTPATIENT
Start: 2020-06-10 | End: 2020-06-11

## 2020-06-10 RX ORDER — PANTOPRAZOLE SODIUM 20 MG/1
40 TABLET, DELAYED RELEASE ORAL DAILY
Refills: 0 | Status: DISCONTINUED | OUTPATIENT
Start: 2020-06-10 | End: 2020-06-13

## 2020-06-10 RX ORDER — SODIUM CHLORIDE 9 MG/ML
1000 INJECTION, SOLUTION INTRAVENOUS
Refills: 0 | Status: DISCONTINUED | OUTPATIENT
Start: 2020-06-10 | End: 2020-06-11

## 2020-06-10 RX ORDER — CHOLECALCIFEROL (VITAMIN D3) 125 MCG
1000 CAPSULE ORAL DAILY
Refills: 0 | Status: DISCONTINUED | OUTPATIENT
Start: 2020-06-10 | End: 2020-06-13

## 2020-06-10 RX ORDER — GLUCAGON INJECTION, SOLUTION 0.5 MG/.1ML
1 INJECTION, SOLUTION SUBCUTANEOUS ONCE
Refills: 0 | Status: DISCONTINUED | OUTPATIENT
Start: 2020-06-10 | End: 2020-06-11

## 2020-06-10 RX ADMIN — Medication 25 MILLIGRAM(S): at 18:56

## 2020-06-10 RX ADMIN — Medication 25 MILLIGRAM(S): at 04:57

## 2020-06-10 RX ADMIN — FAMOTIDINE 20 MILLIGRAM(S): 10 INJECTION INTRAVENOUS at 22:24

## 2020-06-10 RX ADMIN — TAMSULOSIN HYDROCHLORIDE 0.4 MILLIGRAM(S): 0.4 CAPSULE ORAL at 22:23

## 2020-06-10 RX ADMIN — Medication 0: at 08:23

## 2020-06-10 RX ADMIN — Medication 5 MILLIGRAM(S): at 22:23

## 2020-06-10 RX ADMIN — Medication 75 MICROGRAM(S): at 04:57

## 2020-06-10 RX ADMIN — SEVELAMER CARBONATE 800 MILLIGRAM(S): 2400 POWDER, FOR SUSPENSION ORAL at 04:58

## 2020-06-10 RX ADMIN — ERYTHROPOIETIN 10000 UNIT(S): 10000 INJECTION, SOLUTION INTRAVENOUS; SUBCUTANEOUS at 14:33

## 2020-06-10 RX ADMIN — Medication 0: at 18:48

## 2020-06-10 NOTE — H&P ADULT - HISTORY OF PRESENT ILLNESS
HPI:    84 y/o man with HTN, a fib not on AC due to recent GI bleed 2/2 gastric, duodenal AVM's, CAD, CHF, ESRD on HD who p/ws syncope and fall at home. At time of interview, patient denies all complaints, reports feeling normal. Does not recall events around time of fall - believes that "my shoes slipped." However per daughter Merle who was with him at the time of the fall, patient was watching TV, and had used a power lift chair to arise to a standing position. He then froze for 5 seconds, unresponsive, then fell forward landing on his R arm and head. Was then face down and unresponsive for another 10 seconds. Per daughter, she initially thought he was having a seizure. He then woke up and reported feeling fine, but was noted to be mildly confused per daughter. Patient denies palpitations, lightheadedness or CP prior to fall. Daughter denies h/o seizures.    PAST MEDICAL & SURGICAL HISTORY:  Afib  TIA (transient ischemic attack)  CAD (coronary artery disease)  OAB (overactive bladder)  Gout  LBBB (left bundle branch block)  CHF (congestive heart failure)  Aortic valve stenosis  Squamous cell carcinoma of skin  Pleural effusion on left  SANG (obstructive sleep apnea)  ESRD on dialysis  BPH (benign prostatic hyperplasia)  Cutaneous T-cell lymphoma  Hypothyroid  Hypercholesteremia  DM (diabetes mellitus)  HTN (hypertension)  H/O eye surgery  S/P coronary artery stent placement  S/P TAVR (transcatheter aortic valve replacement)  H/O hernia repair      Review of Systems:   CONSTITUTIONAL: No fever, weight loss, or fatigue  EYES: No eye pain, visual disturbances, or discharge  NECK: No pain or stiffness  RESPIRATORY: No cough, wheezing, chills or hemoptysis; No shortness of breath  CARDIOVASCULAR: No chest pain, palpitations, dizziness, or leg swelling  GASTROINTESTINAL: No abdominal or epigastric pain. No nausea, vomiting, or hematemesis; No diarrhea or constipation. No melena or hematochezia.  GENITOURINARY: No dysuria, frequency, hematuria, or incontinence  NEUROLOGICAL: No headaches, memory loss, loss of strength, numbness, or tremors  SKIN: No itching, burning, rashes, or lesions   LYMPH NODES: No enlarged glands  MUSCULOSKELETAL: No joint pain or swelling; No muscle, back, or extremity pain  HEME/LYMPH: No easy bruising, or bleeding gums    Allergies    No Known Allergies    Intolerances        Social History: lives with daughter. Denies T/E/D. Walks with cane.    FAMILY HISTORY:  No pertinent family history in first degree relatives      MEDICATIONS  (STANDING):  dextrose 5%. 1000 milliLiter(s) (50 mL/Hr) IV Continuous <Continuous>  dextrose 50% Injectable 12.5 Gram(s) IV Push once  dextrose 50% Injectable 25 Gram(s) IV Push once  dextrose 50% Injectable 25 Gram(s) IV Push once  epoetin shakila-epbx (RETACRIT) Injectable 29572 Unit(s) IV Push <User Schedule>  insulin lispro (HumaLOG) corrective regimen sliding scale   SubCutaneous three times a day before meals  levothyroxine 75 MICROGram(s) Oral daily  melatonin 5 milliGRAM(s) Oral at bedtime  metoprolol tartrate 25 milliGRAM(s) Oral two times a day  pantoprazole   Suspension 40 milliGRAM(s) Oral daily  sevelamer carbonate 800 milliGRAM(s) Oral three times a day  tamsulosin 0.4 milliGRAM(s) Oral at bedtime    MEDICATIONS  (PRN):  acetaminophen   Tablet .. 650 milliGRAM(s) Oral once PRN Moderate Pain (4 - 6)  dextrose 40% Gel 15 Gram(s) Oral once PRN Blood Glucose LESS THAN 70 milliGRAM(s)/deciliter  glucagon  Injectable 1 milliGRAM(s) IntraMuscular once PRN Glucose LESS THAN 70 milligrams/deciliter      T(C): 36.8 (06-10-20 @ 04:57), Max: 36.8 (06-10-20 @ 04:57)  HR: 83 (06-10-20 @ 04:57) (83 - 86)  BP: 141/65 (06-10-20 @ 04:57) (111/59 - 141/65)  RR: 18 (06-10-20 @ 04:57) (18 - 18)  SpO2: 100% (06-10-20 @ 04:57) (99% - 100%)    CAPILLARY BLOOD GLUCOSE      POCT Blood Glucose.: 86 mg/dL (10 Remberto 2020 08:00)    I&O's Summary      PHYSICAL EXAM: elderly man lying in bed, appears well. Further exam precluded by televisit      LABS:                        9.4    6.21  )-----------( 114      ( 10 Remberto 2020 09:27 )             30.7     06-10    134<L>  |  96<L>  |  39.0<H>  ----------------------------<  141<H>  4.1   |  28.0  |  5.38<H>    Ca    8.5<L>      10 Remberto 2020 09:27  Mg     2.4     06-09    TPro  6.0<L>  /  Alb  2.9<L>  /  TBili  0.4  /  DBili  x   /  AST  42<H>  /  ALT  26  /  AlkPhos  77  06-10      CARDIAC MARKERS ( 10 Remberto 2020 09:27 )  x     / 0.05 ng/mL / x     / x     / x      CARDIAC MARKERS ( 09 Jun 2020 21:06 )  x     / 0.05 ng/mL / x     / x     / x              RADIOLOGY & ADDITIONAL TESTS:    EKG: a fib with LBBB, vent rate 70's    CXR: cardiomegaly, clear lungs, small L pleural effusion    Other radiology      Care Discussed with Consultants/Other Providers:

## 2020-06-10 NOTE — ED CDU PROVIDER DISPOSITION NOTE - ATTENDING CONTRIBUTION TO CARE
pt to be admitted for syncope and dialysis ; pt presently on no anticoagulation secondary to gi bleed; ; pt to be evaluated by cardiology , nephrology and neurology;

## 2020-06-10 NOTE — PROVIDER CONTACT NOTE (OTHER) - ASSESSMENT
Pt with 0/10 pain before, during or after RX.  Pt will benefit from PT to maximize functional independence. Will continue to follow.  Pt left supine in bed in no apparent distress and call bell within reach. Nurse aware

## 2020-06-10 NOTE — ED CDU PROVIDER SUBSEQUENT DAY NOTE - ATTENDING CONTRIBUTION TO CARE
I, Galdino Montenegro, performed a face to face bedside interview with this patient regarding history of present illness, review of symptoms and relevant past medical, social and family history.  I completed an independent physical examination. I have communicated the patient’s plan of care and disposition with the ACP.      pt placed in observation for fall vs syncope; pt with uneventful night;  pt on dialysis MW;  pe awake alert heent   sutured lacer rt side of face; neck supple cor s1 s2 lung clear abd soft; neuro follows commands;      dx ? syncope esrd

## 2020-06-10 NOTE — CONSULT NOTE ADULT - ASSESSMENT
Assessment  Syncope s/p TAVR in setting of AF LBBB must exclude sx nicole or heart block  Chronic AF LBBB not AC candidate  AS s/p TAVR 12/209  CAd s/p remote PCI  HTN  ESRD on HD  GIB sec to duodenal AVMs      Rec Assessment  Syncope s/p TAVR in setting of AF LBBB must exclude sx nicole or heart block  Chronic AF LBBB not AC candidate  AS s/p TAVR 12/2019 with excellent postop result  CAd s/p remote PCI  HTN  ESRD on HD  GIB sec to duodenal AVMs  No sig trop elevation      Rec  cont to monitor on tele  cont low dose lopressor  EP eval in am ? loop insertion if no sig arrhythmia on tele overnight

## 2020-06-10 NOTE — H&P ADULT - ASSESSMENT
84 y/o man with HTN, a fib not on AC due to recent GI bleed 2/2 gastric, duodenal AVM's, AS s/p TAVR, CHF, ESRD on HD who p/ws syncope and fall at home.    *Syncope: unresponsive x 5 seconds prior to fall, then unresponsive 10 seconds. Not orthostatic hypotensive. Given sudden LOC and significant cardiac hx, higher suspicion for cardiac syncope  - f/u neuro exam  - continue telemetry  - consider cardiology consult  - had recent TTE 5/22/20 with MR, TR - f/w cardiology recs  - PT rec home with home PT    *CV: HTN, a fib not on AC, CAD, CHF, MR, TR  - f/u volume status exam  - volume off at HD  - continue cardiovascular regimen - lopressor, fenofibrate  - cards consult as above    *ESRD on HD: renal consult for HD    Fall precautions  OOBTC

## 2020-06-10 NOTE — PROVIDER CONTACT NOTE (OTHER) - ACTION/TREATMENT ORDERED:
PT Goals( to achieve in 2 weeks);Pt independent with bed mobility. Pt supervision with transfers.  Pt supervision with amb with RW X 100 feet

## 2020-06-10 NOTE — H&P ADULT - ATTENDING COMMENTS
Pt seen and examined:    PHYSICAL EXAM:  Vital Signs Last 24 Hrs  T(C): 36.6 (10 Remberto 2020 12:10), Max: 36.8 (10 Remberto 2020 04:57)  T(F): 97.9 (10 Remberto 2020 12:10), Max: 98.3 (10 Remberto 2020 04:57)  HR: 93 (10 Remberto 2020 12:10) (83 - 93)  BP: 133/70 (10 Remberto 2020 12:10) (111/59 - 141/65)  BP(mean): --  RR: 18 (10 Remberto 2020 12:10) (18 - 20)  SpO2: 100% (10 Remberto 2020 12:10) (99% - 100%)    GENERAL: NAD, well-groomed, well-developed  HEAD:  right frontial/temporal abrasion   EYES: EOMI, PERRLA, conjunctiva and sclera clear  ENMT: No tonsillar erythema, exudates, or enlargement; Moist mucous membranes  NERVOUS SYSTEM:  Alert & Oriented X3, Good concentration; Motor Strength 5/5 B/L upper and lower extremities  CHEST/LUNG: decreased BS bilaterally; No rales, rhonchi, wheezing  HEART: Regular rate and rhythm; No murmurs  ABDOMEN: Soft, Nontender, Nondistended; Bowel sounds present  EXTREMITIES:  2+ Peripheral Pulses, No clubbing, cyanosis, + LE edema    1) Syncope  - admit to tele  - concern for cardio etiology  - cardio Dr. Cisse consulted  - CT brain negative  - neuro exam: Motor and sensory intact, no deficits appreciated on exam    2) ESRD on HD  - renal following and on HD    3) Afib  - off AC due to GO bleed  - continue metoprolol, rate controlled     4) HLD  - on fenofibrate    5) Hypothyroidism   - on synthroid  - check TSH    6) DVT ppx  - heparin SQ    I agree with remaining assessment and plan above. Pt seen and examined:    PHYSICAL EXAM:  Vital Signs Last 24 Hrs  T(C): 36.6 (10 Remberto 2020 12:10), Max: 36.8 (10 Remberto 2020 04:57)  T(F): 97.9 (10 Remberto 2020 12:10), Max: 98.3 (10 Remberto 2020 04:57)  HR: 93 (10 Remberto 2020 12:10) (83 - 93)  BP: 133/70 (10 Remberto 2020 12:10) (111/59 - 141/65)  BP(mean): --  RR: 18 (10 Remberto 2020 12:10) (18 - 20)  SpO2: 100% (10 Remberto 2020 12:10) (99% - 100%)    GENERAL: NAD, well-groomed, well-developed  HEAD:  right frontial/temporal abrasion   EYES: EOMI, PERRLA, conjunctiva and sclera clear  ENMT: No tonsillar erythema, exudates, or enlargement; Moist mucous membranes  NERVOUS SYSTEM:  Alert & Oriented X3, Good concentration; Motor Strength 5/5 B/L upper and lower extremities  CHEST/LUNG: decreased BS bilaterally; No rales, rhonchi, wheezing  HEART: Regular rate and rhythm; No murmurs  ABDOMEN: Soft, Nontender, Nondistended; Bowel sounds present  EXTREMITIES:  2+ Peripheral Pulses, No clubbing, cyanosis, + LE edema    1) Syncope  - admit to tele  - concern for cardio etiology  - cardio Dr. Cisse consulted  - CT brain negative  - neuro exam: Motor and sensory intact, no deficits appreciated on exam    2) ESRD on HD  - renal following and on HD    3) Afib  - off AC due to GI bleed  - continue metoprolol, rate controlled     4) HLD/CAD/CHF  - on fenofibrate, metoprolol   - cardio consulted    5) Hypothyroidism   - on synthroid  - check TSH    6) DVT ppx  - heparin SQ    I agree with remaining assessment and plan above.

## 2020-06-10 NOTE — CONSULT NOTE ADULT - ASSESSMENT
86 yo male w.  ESRD MWF with recent  GI Bleed requiring multiple units  of PRBCs, with syncopal episode  after getting up from chair  with LOC X 10 seconds;       He has unsteady gait and increased confusion;     Admitted   for dialysis and work up syncope.    Principal Discharge Dx Syncope, unspecified syncope type.     Secondary Discharge Dx ESRD (end stage renal disease) on dialysis.    S/P TAVR, Cardiomegaly,    CKD - MBD, Anemia, Hypoalbuminemia,

## 2020-06-10 NOTE — CONSULT NOTE ADULT - SUBJECTIVE AND OBJECTIVE BOX
Patient is a 85y old  Male who presents with a chief complaint of     HPI:    PAST MEDICAL & SURGICAL HISTORY:    Afib  TIA (transient ischemic attack)  CAD (coronary artery disease)  OAB (overactive bladder)  Gout  LBBB (left bundle branch block)  CHF (congestive heart failure)  Aortic valve stenosis  Squamous cell carcinoma of skin  Pleural effusion on left  SANG (obstructive sleep apnea)  ESRD on dialysis  BPH (benign prostatic hyperplasia)  Cutaneous T-cell lymphoma  Hypothyroid  Hypercholesteremia  DM (diabetes mellitus)  HTN (hypertension)    H/O eye surgery  S/P coronary artery stent placement  S/P TAVR (transcatheter aortic valve replacement)  H/O hernia repair    FAMILY HISTORY:  No pertinent family history in first degree relatives    Social History: Non Smoker,     MEDICATIONS  (STANDING):  dextrose 5%. 1000 milliLiter(s) (50 mL/Hr) IV Continuous <Continuous>  dextrose 50% Injectable 12.5 Gram(s) IV Push once  dextrose 50% Injectable 25 Gram(s) IV Push once  dextrose 50% Injectable 25 Gram(s) IV Push once  epoetin shakila-epbx (RETACRIT) Injectable 88809 Unit(s) IV Push <User Schedule>  insulin lispro (HumaLOG) corrective regimen sliding scale   SubCutaneous three times a day before meals  levothyroxine 75 MICROGram(s) Oral daily  melatonin 5 milliGRAM(s) Oral at bedtime  metoprolol tartrate 25 milliGRAM(s) Oral two times a day  pantoprazole   Suspension 40 milliGRAM(s) Oral daily  sevelamer carbonate 800 milliGRAM(s) Oral three times a day  tamsulosin 0.4 milliGRAM(s) Oral at bedtime    MEDICATIONS  (PRN):  acetaminophen   Tablet .. 650 milliGRAM(s) Oral once PRN Moderate Pain (4 - 6)  dextrose 40% Gel 15 Gram(s) Oral once PRN Blood Glucose LESS THAN 70 milliGRAM(s)/deciliter  glucagon  Injectable 1 milliGRAM(s) IntraMuscular once PRN Glucose LESS THAN 70 milligrams/deciliter    Allergies    No Known Allergies    REVIEW OF SYSTEMS:    CONSTITUTIONAL: No fever, weight loss, or fatigue  EYES: No eye pain, visual disturbances, or discharge  ENMT:  No difficulty hearing, tinnitus, vertigo; No sinus or throat pain  NECK: No pain or stiffness  RESPIRATORY: No cough, wheezing, chills or hemoptysis; No shortness of breath  CARDIOVASCULAR: No chest pain, palpitations,  + dizziness, + leg swelling  GASTROINTESTINAL: No abdominal or epigastric pain. No nausea, vomiting, or hematemesis; No diarrhea or constipation. No melena or hematochezia.  GENITOURINARY: No dysuria, frequency, hematuria, or incontinence  NEUROLOGICAL: No headaches, memory loss, loss of strength, numbness, or tremors, + LOC,   SKIN: No itching, burning, rashes, or lesions   LYMPH NODES: No enlarged glands  ENDOCRINE: No heat or cold intolerance; No hair loss  MUSCULOSKELETAL: No joint pain or swelling; No muscle, back, or extremity pain  PSYCHIATRIC: No depression, anxiety, mood swings, or difficulty sleeping  HEME/LYMPH: No easy bruising, or bleeding gums  ALLERGY AND IMMUNOLOGIC: No hives or eczema    Vital Signs Last 24 Hrs  T(C): 36.8 (10 Remberto 2020 04:57), Max: 36.8 (10 Remberto 2020 04:57)  T(F): 98.3 (10 Remberto 2020 04:57), Max: 98.3 (10 Remberto 2020 04:57)  HR: 83 (10 Remberto 2020 04:57) (83 - 86)  BP: 141/65 (10 Remberto 2020 04:57) (111/59 - 141/65)  RR: 18 (10 Remberto 2020 04:57) (18 - 18)  SpO2: 100% (10 Remberto 2020 04:57) (99% - 100%)    PHYSICAL EXAM:    GENERAL: NAD, Pale,   HEAD:  Atraumatic, Normocephalic  EYES: EOMI, PERRLA, conjunctiva and sclera clear  ENMT: Moist mucous membranes,   NECK: Supple, No JVD,   NERVOUS SYSTEM:  Lethargic , Mildly confused,   CHEST/LUNG: Dull  to percussion bilaterally; No rales, rhonchi, wheezing, or rubs  HEART: Irregular rate and rhythm; SE murmur, No  rubs, or gallops  ABDOMEN: Soft, Nontender, Nondistended; Bowel sounds present  EXTREMITIES:  2+ Peripheral Pulses, No clubbing, cyanosis, or edema  LYMPH: No lymphadenopathy noted  SKIN: No rashes or lesions,    Access : R - IJ TDC,     LABS:                        9.4    6.21  )-----------( 114      ( 10 Remberto 2020 09:27 )             30.7     06-10    134<L>  |  96<L>  |  39.0<H>  ----------------------------<  141<H>  4.1   |  28.0  |  5.38<H>    Ca    8.5<L>      10 Jun 2020 09:27  Mg     2.4     06-09    TPro  6.0<L>  /  Alb  2.9<L>  /  TBili  0.4  /  DBili  x   /  AST  42<H>  /  ALT  26  /  AlkPhos  77  06-10    Magnesium, Serum: 2.4 mg/dL (06-09 @ 21:06)    RADIOLOGY & ADDITIONAL TESTS:    Xray Chest 1 View-PORTABLE IMMEDIATE (06.09.20 @ 20:31)     INTERPRETATION:  History: Chest pain    Portable radiograph of the chest is performed.  Comparison  is made to 5/29/2020    Right dialysis catheter is again seen. The heart is again enlarged. TAVR is again identified. Left CP angle is obscured questionably secondary to pleural fluid. Right ankle is clear. There is osteopenia of the bony structures.    Impression: Cardiomegaly. Dialysis catheter. TAVR. Obscuration of the left CP angle may represent small pleural effusion    YOLANDA CAREY M.D.,ATTENDING RADIOLOGIST  This document has been electronically signed. Jun 9 2020  8:54PM    CT Head No Cont (06.09.20 @ 20:14)                       PROCEDURE DATE:  06/09/2020      INTERPRETATION:  HISTORY: Injury    Evaluation demonstrates no evidence of mass-effect or midline shift. No intraparenchymal mass lesions or hemorrhage is identified. There is no evidence of hydrocephalus. No extra-axial collections are noted.    The bone windows demonstrate no gross osseous abnormalities.    Impression:    1. Unremarkable noncontrast CT scan of the brain.

## 2020-06-10 NOTE — PHYSICAL THERAPY INITIAL EVALUATION ADULT - ADDITIONAL COMMENTS
Pt reports living with daughter in a 1 story house with ramp to enter. Reports that daughter is always available to assist.  Reports amb with RW and being Modified Independent with all PTA.

## 2020-06-10 NOTE — ED CDU PROVIDER DISPOSITION NOTE - CLINICAL COURSE
86 yo male pmh ESRD MWF with recent  GI Bleed requiring multiple units PRBCs, with syncopal episode; spoke with daughter, Merle Edwards who noted pt with syncopal episode after getting up from chair  with LOC X 10 seconds;   she also noted since patient discharge he has unsteady gait and increased confusion; pt to be admitted to for dialysis and work up syncope

## 2020-06-10 NOTE — ED CDU PROVIDER SUBSEQUENT DAY NOTE - HISTORY
PT placed in OBS, has had no acute incidents or complaints while in CDU PT is stable. PT Placed in OBS for PT and possible placement due to family having difficulty caring for Pt at home.

## 2020-06-10 NOTE — CONSULT NOTE ADULT - SUBJECTIVE AND OBJECTIVE BOX
Formerly [facility], THE HEART CENTER                                   [address] [place] [address]                                                      PHONE: [phone]                                                         FAX: [phone]  [email or web address]  ---------------------------------------------------------------------------------------------------------------------------------    Reason for Consult: syncope    HPI:  TAMY WHITE is an 85y Male with HTN 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    PAST MEDICAL & SURGICAL HISTORY:  Afib  TIA (transient ischemic attack)  CAD (coronary artery disease)  OAB (overactive bladder)  Gout  LBBB (left bundle branch block)  CHF (congestive heart failure)  Aortic valve stenosis  Squamous cell carcinoma of skin  Pleural effusion on left  SANG (obstructive sleep apnea)  ESRD on dialysis  BPH (benign prostatic hyperplasia)  Cutaneous T-cell lymphoma  Hypothyroid  Hypercholesteremia  DM (diabetes mellitus)  HTN (hypertension)  H/O eye surgery  S/P coronary artery stent placement  S/P TAVR (transcatheter aortic valve replacement)  H/O hernia repair      No Known Allergies      MEDICATIONS  (STANDING):  cholecalciferol 1000 Unit(s) Oral daily  dextrose 5%. 1000 milliLiter(s) (50 mL/Hr) IV Continuous <Continuous>  dextrose 50% Injectable 12.5 Gram(s) IV Push once  dextrose 50% Injectable 25 Gram(s) IV Push once  dextrose 50% Injectable 25 Gram(s) IV Push once  epoetin shakila-epbx (RETACRIT) Injectable 35773 Unit(s) IV Push <User Schedule>  famotidine  Oral Tab/Cap - Peds 20 milliGRAM(s) Oral at bedtime  fenofibrate Tablet 145 milliGRAM(s) Oral daily  insulin lispro (HumaLOG) corrective regimen sliding scale   SubCutaneous three times a day before meals  levothyroxine 75 MICROGram(s) Oral daily  melatonin 5 milliGRAM(s) Oral at bedtime  metoprolol tartrate 25 milliGRAM(s) Oral two times a day  pantoprazole   Suspension 40 milliGRAM(s) Oral daily  sevelamer carbonate 800 milliGRAM(s) Oral three times a day  tamsulosin 0.4 milliGRAM(s) Oral at bedtime    MEDICATIONS  (PRN):  acetaminophen   Tablet .. 650 milliGRAM(s) Oral once PRN Moderate Pain (4 - 6)  dextrose 40% Gel 15 Gram(s) Oral once PRN Blood Glucose LESS THAN 70 milliGRAM(s)/deciliter  glucagon  Injectable 1 milliGRAM(s) IntraMuscular once PRN Glucose LESS THAN 70 milligrams/deciliter  polyethylene glycol 3350 17 Gram(s) Oral daily PRN Constipation      Social History:  Cigarettes:           neg         Alchohol:      neg           Illicit Drug Abuse:  neg  neg FH CAD    ROS: Negative other than as mentioned in HPI.    Vital Signs Last 24 Hrs  T(C): 36.6 (10 Remberto 2020 12:10), Max: 36.8 (10 Remberto 2020 04:57)  T(F): 97.9 (10 Remberto 2020 12:10), Max: 98.3 (10 Remberto 2020 04:57)  HR: 93 (10 Remberto 2020 12:10) (83 - 93)  BP: 133/70 (10 Remberto 2020 12:10) (111/59 - 141/65)  BP(mean): --  RR: 18 (10 Remberto 2020 12:10) (18 - 20)  SpO2: 100% (10 Remberto 2020 12:10) (99% - 100%)  ICU Vital Signs Last 24 Hrs  TAMY WHITE  I&O's Detail    I&O's Summary    Drug Dosing Weight  TAMY WHITE      PHYSICAL EXAM:  General: Appears well developed, well nourished alert and cooperative.  HEENT: Head; normocephalic, atraumatic.  Eyes: Pupils reactive, cornea wnl.  Neck: Supple, no nodes adenopathy, no NVD or carotid bruit or thyromegaly.  CARDIOVASCULAR: Normal S1 and S2, No murmur, rub, gallop or lift.   LUNGS: No rales, rhonchi or wheeze. Normal breath sounds bilaterally.  ABDOMEN: Soft, nontender without mass or organomegaly. bowel sounds normoactive.  EXTREMITIES: No clubbing, cyanosis or edema. Distal pulses wnl.   SKIN: warm and dry with normal turgor.  NEURO: Alert/oriented x 3/normal motor exam. No pathologic reflexes.    PSYCH: normal affect.        LABS:                        9.4    6.21  )-----------( 114      ( 10 Remberto 2020 09:27 )             30.7     06-10    134<L>  |  96<L>  |  39.0<H>  ----------------------------<  141<H>  4.1   |  28.0  |  5.38<H>    Ca    8.5<L>      10 Remberto 2020 09:27  Phos  3.0     06-10  Mg     2.4     06-09    TPro  6.0<L>  /  Alb  2.9<L>  /  TBili  0.4  /  DBili  x   /  AST  42<H>  /  ALT  26  /  AlkPhos  77  06-10    TAMY [facility]  CARDIAC MARKERS ( 10 Remberto 2020 09:27 )  x     / 0.05 ng/mL / x     / x     / x      CARDIAC MARKERS ( 09 Jun 2020 21:06 )  x     / 0.05 ng/mL / x     / x     / x                RADIOLOGY & ADDITIONAL STUDIES:    INTERPRETATION OF TELEMETRY (personally reviewed):    ECG: AF with CVR LBBB    ECHO: < from: TTE Echo Complete w/o contrast w/ Doppler (05.11.20 @ 14:19) >     Mild concentric left ventricular hypertrophy is present.   Estimated left ventricular ejection fraction is 60 %.   The left atrium is moderately dilated.   The right atrium appears moderately dilated.   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 Peak   trans-prosthetic gradient is within normal limitations for this type of   prosthesis.   Mild mitral annular calcification is present.   Fibrocalcific changes noted to the mitral valve leaflets with preserved   leaflet excursion.   Moderate (2+) mitral regurgitation is present.   The tricuspid valve leaflets are thin and pliable; valve motion is normal.   Moderate (2+) tricuspid valve regurgitation is present.   Mild to moderate pulmonary hypertension.   Mild pulmonic valvular regurgitation (1+) is present.   The IVC is dilated with decreased respiratory variation     Signature     ----------------------------------------------------------------   Electronically signed by Dayan Jenkins MD(Interpreting   physician) on 05/11/2020 04:22 PM   ----------------------------------------------------------------    Valves     Mitral Valve     Peak E-Wave: 133 cm/s   Peak Gradient: 7.08 mmHg     Aortic Valve     Peak Velocity: 204 cm/s       Mean Gradient: 9 mmHg   Peak Gradient: 16.65 mmHg                AV VTI: 41.8 cm     Tricuspid Valve     TR Velocity: 308 cm/s                  Estimated RAP: 10 mmHg   TR Gradient: 37.9456 mmHg              Estimated RVSP: 48 mmHg     Pulmonic Valve              Estimated PASP: 47.95 mmHg     LVOT     Peak Velocity: 79.5 cm/s   Peak Gradient: 3 mmHg    Structures     Left Atrium     LA Dimension: 4.9 cm          LA Area: 29.6 cm^2   LA/Aorta: 1.81                LA Volume/Index: 89 ml /46m^2     Left Ventricle     Diastolic Dimension: 4.22 cm            Systolic Dimension: 3 cm   Septum Diastolic: 1.4 cm   PW Diastolic: 1.13 cm     FS: 28.91 %     Right Atrium     RA Systolic Pressure: 10 mmHg     Right Ventricle              RV Systolic Pressure: 47.95 mmHg     Miscellaneous     Aorta     Aortic Root: 2.7 cm                    DAYAN JENKINS M.D., ATTENDING CARDIOLOGIST  This document has been electronically signed. May 11 2020  4:22PM                < end of copied text > [facility], THE HEART CENTER                                   [address] [place] [address]                                                      PHONE: [phone]                                                         FAX: [phone]  [email or web address]  ---------------------------------------------------------------------------------------------------------------------------------    Reason for Consult: syncope    HPI:  TAMY WHITE is an 85y Male with HTN 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  t was sitting in chair at home , arose then had sudden LOC without premonitory sx.  No asso cp or palps, ? unresponsive for few mins, currently asx.  Incurred head laceration.    PAST MEDICAL & SURGICAL HISTORY:  Afib  TIA (transient ischemic attack)  CAD (coronary artery disease)  OAB (overactive bladder)  Gout  LBBB (left bundle branch block)  CHF (congestive heart failure)  Aortic valve stenosis  Squamous cell carcinoma of skin  Pleural effusion on left  SANG (obstructive sleep apnea)  ESRD on dialysis  BPH (benign prostatic hyperplasia)  Cutaneous T-cell lymphoma  Hypothyroid  Hypercholesteremia  DM (diabetes mellitus)  HTN (hypertension)  H/O eye surgery  S/P coronary artery stent placement  S/P TAVR (transcatheter aortic valve replacement)  H/O hernia repair      No Known Allergies      MEDICATIONS  (STANDING):  cholecalciferol 1000 Unit(s) Oral daily  dextrose 5%. 1000 milliLiter(s) (50 mL/Hr) IV Continuous <Continuous>  dextrose 50% Injectable 12.5 Gram(s) IV Push once  dextrose 50% Injectable 25 Gram(s) IV Push once  dextrose 50% Injectable 25 Gram(s) IV Push once  epoetin shakila-epbx (RETACRIT) Injectable 02091 Unit(s) IV Push <User Schedule>  famotidine  Oral Tab/Cap - Peds 20 milliGRAM(s) Oral at bedtime  fenofibrate Tablet 145 milliGRAM(s) Oral daily  insulin lispro (HumaLOG) corrective regimen sliding scale   SubCutaneous three times a day before meals  levothyroxine 75 MICROGram(s) Oral daily  melatonin 5 milliGRAM(s) Oral at bedtime  metoprolol tartrate 25 milliGRAM(s) Oral two times a day  pantoprazole   Suspension 40 milliGRAM(s) Oral daily  sevelamer carbonate 800 milliGRAM(s) Oral three times a day  tamsulosin 0.4 milliGRAM(s) Oral at bedtime    MEDICATIONS  (PRN):  acetaminophen   Tablet .. 650 milliGRAM(s) Oral once PRN Moderate Pain (4 - 6)  dextrose 40% Gel 15 Gram(s) Oral once PRN Blood Glucose LESS THAN 70 milliGRAM(s)/deciliter  glucagon  Injectable 1 milliGRAM(s) IntraMuscular once PRN Glucose LESS THAN 70 milligrams/deciliter  polyethylene glycol 3350 17 Gram(s) Oral daily PRN Constipation      Social History:  Cigarettes:           neg         Alchohol:      neg           Illicit Drug Abuse:  neg  neg FH CAD    ROS: Negative other than as mentioned in HPI.    Vital Signs Last 24 Hrs  T(C): 36.6 (10 Remberto 2020 12:10), Max: 36.8 (10 Remberto 2020 04:57)  T(F): 97.9 (10 Remberto 2020 12:10), Max: 98.3 (10 Remberto 2020 04:57)  HR: 93 (10 Remberto 2020 12:10) (83 - 93)  BP: 133/70 (10 Remberto 2020 12:10) (111/59 - 141/65)  BP(mean): --  RR: 18 (10 Remberto 2020 12:10) (18 - 20)  SpO2: 100% (10 Rebmerto 2020 12:10) (99% - 100%)  ICU Vital Signs Last 24 Hrs  TAMY WHITE  I&O's Detail    I&O's Summary    Drug Dosing Weight  [facility]      PHYSICAL EXAM:  General: Appears well developed, well nourished alert and cooperative.  HEENT: Head; normocephalic, atraumatic.  Eyes: Pupils reactive, cornea wnl.  Neck: Supple, no nodes adenopathy, no NVD or carotid bruit or thyromegaly.  CARDIOVASCULAR: Normal S1 and S2, soft VICTORINO at base  LUNGS: No rales, rhonchi or wheeze. Normal breath sounds bilaterally.  ABDOMEN: Soft, nontender without mass or organomegaly. bowel sounds normoactive.  EXTREMITIES: No clubbing, cyanosis or edema. Distal pulses wnl.   SKIN: warm and dry with normal turgor.  NEURO: Alert/oriented x 3/normal motor exam. No pathologic reflexes.    PSYCH: normal affect.        LABS:                        9.4    6.21  )-----------( 114      ( 10 Remberto 2020 09:27 )             30.7     06-10    134<L>  |  96<L>  |  39.0<H>  ----------------------------<  141<H>  4.1   |  28.0  |  5.38<H>    Ca    8.5<L>      10 Remberto 2020 09:27  Phos  3.0     06-10  Mg     2.4     06-09    TPro  6.0<L>  /  Alb  2.9<L>  /  TBili  0.4  /  DBili  x   /  AST  42<H>  /  ALT  26  /  AlkPhos  77  06-10    [facility]  CARDIAC MARKERS ( 10 Remberto 2020 09:27 )  x     / 0.05 ng/mL / x     / x     / x      CARDIAC MARKERS ( 09 Jun 2020 21:06 )  x     / 0.05 ng/mL / x     / x     / x                RADIOLOGY & ADDITIONAL STUDIES:    INTERPRETATION OF TELEMETRY (personally reviewed):    ECG: AF with CVR LBBB    ECHO: < from: TTE Echo Complete w/o contrast w/ Doppler (05.11.20 @ 14:19) >     Mild concentric left ventricular hypertrophy is present.   Estimated left ventricular ejection fraction is 60 %.   The left atrium is moderately dilated.   The right atrium appears moderately dilated.   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 Peak   trans-prosthetic gradient is within normal limitations for this type of   prosthesis.   Mild mitral annular calcification is present.   Fibrocalcific changes noted to the mitral valve leaflets with preserved   leaflet excursion.   Moderate (2+) mitral regurgitation is present.   The tricuspid valve leaflets are thin and pliable; valve motion is normal.   Moderate (2+) tricuspid valve regurgitation is present.   Mild to moderate pulmonary hypertension.   Mild pulmonic valvular regurgitation (1+) is present.   The IVC is dilated with decreased respiratory variation     Signature     ----------------------------------------------------------------   Electronically signed by Dayan Jenkins MD(Interpreting   physician) on 05/11/2020 04:22 PM   ----------------------------------------------------------------    Valves     Mitral Valve     Peak E-Wave: 133 cm/s   Peak Gradient: 7.08 mmHg     Aortic Valve     Peak Velocity: 204 cm/s       Mean Gradient: 9 mmHg   Peak Gradient: 16.65 mmHg                AV VTI: 41.8 cm     Tricuspid Valve     TR Velocity: 308 cm/s                  Estimated RAP: 10 mmHg   TR Gradient: 37.9456 mmHg              Estimated RVSP: 48 mmHg     Pulmonic Valve              Estimated PASP: 47.95 mmHg     LVOT     Peak Velocity: 79.5 cm/s   Peak Gradient: 3 mmHg    Structures     Left Atrium     LA Dimension: 4.9 cm          LA Area: 29.6 cm^2   LA/Aorta: 1.81                LA Volume/Index: 89 ml /46m^2     Left Ventricle     Diastolic Dimension: 4.22 cm            Systolic Dimension: 3 cm   Septum Diastolic: 1.4 cm   PW Diastolic: 1.13 cm     FS: 28.91 %     Right Atrium     RA Systolic Pressure: 10 mmHg     Right Ventricle              RV Systolic Pressure: 47.95 mmHg     Miscellaneous     Aorta     Aortic Root: 2.7 cm                    DAYAN JENKINS M.D., ATTENDING CARDIOLOGIST  This document has been electronically signed. May 11 2020  4:22PM                < end of copied text >

## 2020-06-11 LAB
ANION GAP SERPL CALC-SCNC: 10 MMOL/L — SIGNIFICANT CHANGE UP (ref 5–17)
APPEARANCE UR: CLEAR — SIGNIFICANT CHANGE UP
BACTERIA # UR AUTO: NEGATIVE — SIGNIFICANT CHANGE UP
BILIRUB UR-MCNC: NEGATIVE — SIGNIFICANT CHANGE UP
BUN SERPL-MCNC: 18 MG/DL — SIGNIFICANT CHANGE UP (ref 8–20)
CALCIUM SERPL-MCNC: 8.5 MG/DL — LOW (ref 8.6–10.2)
CHLORIDE SERPL-SCNC: 99 MMOL/L — SIGNIFICANT CHANGE UP (ref 98–107)
CO2 SERPL-SCNC: 27 MMOL/L — SIGNIFICANT CHANGE UP (ref 22–29)
COLOR SPEC: YELLOW — SIGNIFICANT CHANGE UP
CREAT SERPL-MCNC: 3.52 MG/DL — HIGH (ref 0.5–1.3)
DIFF PNL FLD: NEGATIVE — SIGNIFICANT CHANGE UP
EPI CELLS # UR: SIGNIFICANT CHANGE UP
GLUCOSE BLDC GLUCOMTR-MCNC: 117 MG/DL — HIGH (ref 70–99)
GLUCOSE BLDC GLUCOMTR-MCNC: 90 MG/DL — SIGNIFICANT CHANGE UP (ref 70–99)
GLUCOSE SERPL-MCNC: 97 MG/DL — SIGNIFICANT CHANGE UP (ref 70–99)
GLUCOSE UR QL: NEGATIVE MG/DL — SIGNIFICANT CHANGE UP
HCT VFR BLD CALC: 32.5 % — LOW (ref 39–50)
HGB BLD-MCNC: 10.1 G/DL — LOW (ref 13–17)
KETONES UR-MCNC: NEGATIVE — SIGNIFICANT CHANGE UP
LEUKOCYTE ESTERASE UR-ACNC: NEGATIVE — SIGNIFICANT CHANGE UP
MAGNESIUM SERPL-MCNC: 2.1 MG/DL — SIGNIFICANT CHANGE UP (ref 1.6–2.6)
MCHC RBC-ENTMCNC: 30.7 PG — SIGNIFICANT CHANGE UP (ref 27–34)
MCHC RBC-ENTMCNC: 31.1 GM/DL — LOW (ref 32–36)
MCV RBC AUTO: 98.8 FL — SIGNIFICANT CHANGE UP (ref 80–100)
MRSA PCR RESULT.: SIGNIFICANT CHANGE UP
NITRITE UR-MCNC: NEGATIVE — SIGNIFICANT CHANGE UP
PH UR: 8 — SIGNIFICANT CHANGE UP (ref 5–8)
PLATELET # BLD AUTO: 103 K/UL — LOW (ref 150–400)
POTASSIUM SERPL-MCNC: 3.9 MMOL/L — SIGNIFICANT CHANGE UP (ref 3.5–5.3)
POTASSIUM SERPL-SCNC: 3.9 MMOL/L — SIGNIFICANT CHANGE UP (ref 3.5–5.3)
PROT UR-MCNC: 100 MG/DL
RBC # BLD: 3.29 M/UL — LOW (ref 4.2–5.8)
RBC # FLD: 20.2 % — HIGH (ref 10.3–14.5)
RBC CASTS # UR COMP ASSIST: NEGATIVE /HPF — SIGNIFICANT CHANGE UP (ref 0–4)
S AUREUS DNA NOSE QL NAA+PROBE: SIGNIFICANT CHANGE UP
SARS-COV-2 IGG SERPL QL IA: POSITIVE
SARS-COV-2 IGM SERPL IA-ACNC: 2.39 INDEX — HIGH
SODIUM SERPL-SCNC: 136 MMOL/L — SIGNIFICANT CHANGE UP (ref 135–145)
SP GR SPEC: 1.01 — SIGNIFICANT CHANGE UP (ref 1.01–1.02)
TSH SERPL-MCNC: 4.8 UIU/ML — HIGH (ref 0.27–4.2)
UROBILINOGEN FLD QL: NEGATIVE MG/DL — SIGNIFICANT CHANGE UP
WBC # BLD: 5.62 K/UL — SIGNIFICANT CHANGE UP (ref 3.8–10.5)
WBC # FLD AUTO: 5.62 K/UL — SIGNIFICANT CHANGE UP (ref 3.8–10.5)
WBC UR QL: SIGNIFICANT CHANGE UP

## 2020-06-11 PROCEDURE — 99233 SBSQ HOSP IP/OBS HIGH 50: CPT

## 2020-06-11 PROCEDURE — 73080 X-RAY EXAM OF ELBOW: CPT | Mod: 26,RT

## 2020-06-11 PROCEDURE — 71260 CT THORAX DX C+: CPT | Mod: 26

## 2020-06-11 PROCEDURE — 71275 CT ANGIOGRAPHY CHEST: CPT | Mod: 26

## 2020-06-11 PROCEDURE — 99223 1ST HOSP IP/OBS HIGH 75: CPT

## 2020-06-11 RX ORDER — SEVELAMER CARBONATE 2400 MG/1
2 POWDER, FOR SUSPENSION ORAL
Qty: 0 | Refills: 0 | DISCHARGE

## 2020-06-11 RX ORDER — FAMOTIDINE 10 MG/ML
1 INJECTION INTRAVENOUS
Qty: 0 | Refills: 0 | DISCHARGE

## 2020-06-11 RX ORDER — LANOLIN ALCOHOL/MO/W.PET/CERES
1 CREAM (GRAM) TOPICAL
Qty: 0 | Refills: 0 | DISCHARGE

## 2020-06-11 RX ORDER — CHLORHEXIDINE GLUCONATE 213 G/1000ML
1 SOLUTION TOPICAL ONCE
Refills: 0 | Status: DISCONTINUED | OUTPATIENT
Start: 2020-06-11 | End: 2020-06-13

## 2020-06-11 RX ORDER — TAMSULOSIN HYDROCHLORIDE 0.4 MG/1
1 CAPSULE ORAL
Qty: 0 | Refills: 0 | DISCHARGE

## 2020-06-11 RX ADMIN — SEVELAMER CARBONATE 800 MILLIGRAM(S): 2400 POWDER, FOR SUSPENSION ORAL at 15:57

## 2020-06-11 RX ADMIN — Medication 1000 UNIT(S): at 15:57

## 2020-06-11 RX ADMIN — Medication 25 MILLIGRAM(S): at 19:32

## 2020-06-11 RX ADMIN — Medication 5 MILLIGRAM(S): at 21:55

## 2020-06-11 RX ADMIN — Medication 75 MICROGRAM(S): at 06:51

## 2020-06-11 RX ADMIN — TAMSULOSIN HYDROCHLORIDE 0.4 MILLIGRAM(S): 0.4 CAPSULE ORAL at 21:55

## 2020-06-11 RX ADMIN — PANTOPRAZOLE SODIUM 40 MILLIGRAM(S): 20 TABLET, DELAYED RELEASE ORAL at 15:58

## 2020-06-11 RX ADMIN — SEVELAMER CARBONATE 800 MILLIGRAM(S): 2400 POWDER, FOR SUSPENSION ORAL at 06:40

## 2020-06-11 RX ADMIN — Medication 145 MILLIGRAM(S): at 15:58

## 2020-06-11 RX ADMIN — Medication 25 MILLIGRAM(S): at 06:40

## 2020-06-11 RX ADMIN — FAMOTIDINE 20 MILLIGRAM(S): 10 INJECTION INTRAVENOUS at 21:55

## 2020-06-11 RX ADMIN — SEVELAMER CARBONATE 800 MILLIGRAM(S): 2400 POWDER, FOR SUSPENSION ORAL at 06:29

## 2020-06-11 RX ADMIN — SEVELAMER CARBONATE 800 MILLIGRAM(S): 2400 POWDER, FOR SUSPENSION ORAL at 21:55

## 2020-06-11 NOTE — CONSULT NOTE ADULT - ASSESSMENT
The patient is a 85y Male who is followed by neurology because of episode of unreposnsiveness    Syncope vs seizure    check EEG  no AED at this time  echo from last month as above    Atrial fib  not on anticoagulation due to recent GIB  can restart when cleared    will follow with you    Victor Manuel Hernandez MD PhD   [id number]

## 2020-06-11 NOTE — PROGRESS NOTE ADULT - SUBJECTIVE AND OBJECTIVE BOX
[place] [facility] - [facility], THE HEART CENTER                                   [address] [place] [address]                                                      PHONE: [phone]                                                         FAX: [phone]  [email or web address]  ---------------------------------------------------------------------------------------------------------------------------------    Overnight events/patient complaints: No dizziness or palpitations.  No CP or SOB.  Tele- AF, rate normal, episode of tachy while being moved, no nicole      No Known Allergies    MEDICATIONS  (STANDING):  chlorhexidine 2% Cloths 1 Application(s) Topical once  cholecalciferol 1000 Unit(s) Oral daily  epoetin shakila-epbx (RETACRIT) Injectable 87780 Unit(s) IV Push <User Schedule>  famotidine  Oral Tab/Cap - Peds 20 milliGRAM(s) Oral at bedtime  fenofibrate Tablet 145 milliGRAM(s) Oral daily  levothyroxine 75 MICROGram(s) Oral daily  melatonin 5 milliGRAM(s) Oral at bedtime  metoprolol tartrate 25 milliGRAM(s) Oral two times a day  pantoprazole   Suspension 40 milliGRAM(s) Oral daily  sevelamer carbonate 800 milliGRAM(s) Oral three times a day  tamsulosin 0.4 milliGRAM(s) Oral at bedtime    MEDICATIONS  (PRN):  acetaminophen   Tablet .. 650 milliGRAM(s) Oral once PRN Moderate Pain (4 - 6)  polyethylene glycol 3350 17 Gram(s) Oral daily PRN Constipation      Vital Signs Last 24 Hrs  T(C): 36.7 (2020 16:00), Max: 36.9 (10 Remberto 2020 21:57)  T(F): 98 (2020 16:00), Max: 98.4 (10 Remberto 2020 21:57)  HR: 90 (2020 16:) (76 - 90)  BP: 147/77 (:) (108/69 - 147/77)  BP(mean): --  RR: 17 (2020 16:) (16 - 20)  SpO2: 98% (2020 16:) (93% - 100%)  ICU Vital Signs Last 24 Hrs  TAMY WHITE  I&O's Detail    10 Remberto 2020 07:01  -  2020 07:  --------------------------------------------------------  IN:    Oral Fluid: 240 mL  Total IN: 240 mL    OUT:    Other: 1500 mL    Voided: 50 mL  Total OUT: 1550 mL    Total NET: -1310 mL      :  -  2020 16:35  --------------------------------------------------------  IN:  Total IN: 0 mL    OUT:    Voided: 15 mL  Total OUT: 15 mL    Total NET: -15 mL        I&O's Summary    10 Remberto 2020 07:01  -  2020 07:00  --------------------------------------------------------  IN: 240 mL / OUT: 1550 mL / NET: -1310 mL      -  2020 16:35  --------------------------------------------------------  IN: 0 mL / OUT: 15 mL / NET: -15 mL      Drug Dosing Weight  TAMY WHITE      PHYSICAL EXAM:  General: Appears well developed, well nourished alert and cooperative.  HEENT: Head; normocephalic, atraumatic.  Eyes: Pupils reactive, cornea wnl.  Neck: Supple, no nodes adenopathy, no NVD or carotid bruit or thyromegaly.  CARDIOVASCULAR: irregularly irregular, 2/6 syst ejection murmur, no rub, gallop or lift.   LUNGS: No rales, rhonchi or wheeze. Normal breath sounds bilaterally.  ABDOMEN: Soft, nontender without mass or organomegaly. bowel sounds normoactive.  EXTREMITIES: No clubbing, cyanosis or edema. Distal pulses wnl.   SKIN: warm and dry with normal turgor.  NEURO: Alert/oriented x 3/normal motor exam. No pathologic reflexes.    PSYCH: normal affect.        LABS:                        10.1   5.62  )-----------( 103      ( 2020 06:00 )             32.5     06-11    136  |  99  |  18.0  ----------------------------<  97  3.9   |  27.0  |  3.52<H>    Ca    8.5<L>      2020 06:00  Phos  3.0     06-10  Mg     2.1     06-11    TPro  6.0<L>  /  Alb  2.9<L>  /  TBili  0.4  /  DBili  x   /  AST  42<H>  /  ALT  26  /  AlkPhos  77  06-10    TAMYFAN WHITE  CARDIAC MARKERS ( 10 Remberto 2020 09:27 )  x     / 0.05 ng/mL / x     / x     / x      CARDIAC MARKERS ( 2020 21:06 )  x     / 0.05 ng/mL / x     / x     / x            Urinalysis Basic - ( 2020 12:36 )    Color: Yellow / Appearance: Clear / S.010 / pH: x  Gluc: x / Ketone: Negative  / Bili: Negative / Urobili: Negative mg/dL   Blood: x / Protein: 100 mg/dL / Nitrite: Negative   Leuk Esterase: Negative / RBC: Negative /HPF / WBC 3-5   Sq Epi: x / Non Sq Epi: Occasional / Bacteria: Negative        RADIOLOGY & ADDITIONAL STUDIES:    INTERPRETATION OF TELEMETRY (personally reviewed):AF, rates OK    ECG: AF, LBBB    ECHO: normal EF, normal TAVR, mod MR 2020      ASSESSMENT AND PLAN:  LOC ?etiology  chronic AF, rate seems normal here on tele  Would consider discontinuation of flomax given syncopal episode occurred with positional change  Continue tele overnight  If negative would arrange for outpt Cardionet 30 day MCOT to r/o occult nicole  ESRD- HD tomorrow  Anemia- recent cautery of AVM.  Off ASA and A/C.  Hgb stable here

## 2020-06-11 NOTE — PROGRESS NOTE ADULT - SUBJECTIVE AND OBJECTIVE BOX
CC: Syncope    INTERVAL HPI/OVERNIGHT EVENTS: Patient seen and examined. C/O right arm discomfort at wound site. Denies chest pain, SOB, dizziness, nausea, vomiting, fever, chills. Still urinates and had some discomfort as per RN.     Vital Signs Last 24 Hrs  T(C): 36.4 (11 Jun 2020 07:33), Max: 36.9 (10 Remberto 2020 21:57)  T(F): 97.5 (11 Jun 2020 07:33), Max: 98.4 (10 Remberto 2020 21:57)  HR: 76 (11 Jun 2020 07:33) (76 - 93)  BP: 108/69 (11 Jun 2020 07:33) (108/69 - 146/84)  BP(mean): --  RR: 18 (11 Jun 2020 07:33) (16 - 20)  SpO2: 93% (11 Jun 2020 07:33) (93% - 100%)    PHYSICAL EXAM:  General: Appears well developed, well nourished alert and cooperative.  HEENT: Ecchymosis left temporal area, brow bone  Eyes: Pupils reactive, cornea wnl.  Neck: Supple, no nodes adenopathy, no JVD  CARDIOVASCULAR: Normal S1 and S2  LUNGS: No rales, rhonchi or wheeze. Normal breath sounds bilaterally.  ABDOMEN: Soft, nontender without mass or organomegaly. bowel sounds normoactive.  EXTREMITIES: No clubbing, cyanosis or edema. Distal pulses wnl.   SKIN: Skin tears/open areas right arm, right wrist, left hand  NEURO: Alert/oriented x 3/normal motor exam. No pathologic reflexes.    PSYCH: normal affect.      I&O's Detail    10 Remberto 2020 07:01  -  11 Jun 2020 07:00  --------------------------------------------------------  IN:    Oral Fluid: 240 mL  Total IN: 240 mL    OUT:    Other: 1500 mL    Voided: 50 mL  Total OUT: 1550 mL    Total NET: -1310 mL          CARDIAC MARKERS ( 10 Remberto 2020 09:27 )  x     / 0.05 ng/mL / x     / x     / x      CARDIAC MARKERS ( 09 Jun 2020 21:06 )  x     / 0.05 ng/mL / x     / x     / x                                10.1   5.62  )-----------( 103      ( 11 Jun 2020 06:00 )             32.5     11 Jun 2020 06:00    136    |  99     |  18.0   ----------------------------<  97     3.9     |  27.0   |  3.52     Ca    8.5        11 Jun 2020 06:00  Phos  3.0       10 Remberto 2020 09:27  Mg     2.1       11 Jun 2020 06:00    TPro  6.0    /  Alb  2.9    /  TBili  0.4    /  DBili  x      /  AST  42     /  ALT  26     /  AlkPhos  77     10 Remberto 2020 09:27      CAPILLARY BLOOD GLUCOSE  114 (10 Ermberto 2020 11:30)      POCT Blood Glucose.: 114 mg/dL (10 Remberto 2020 18:23)    LIVER FUNCTIONS - ( 10 Remberto 2020 09:27 )  Alb: 2.9 g/dL / Pro: 6.0 g/dL / ALK PHOS: 77 U/L / ALT: 26 U/L / AST: 42 U/L / GGT: x               MEDICATIONS  (STANDING):  chlorhexidine 2% Cloths 1 Application(s) Topical once  cholecalciferol 1000 Unit(s) Oral daily  dextrose 5%. 1000 milliLiter(s) (50 mL/Hr) IV Continuous <Continuous>  dextrose 50% Injectable 12.5 Gram(s) IV Push once  dextrose 50% Injectable 25 Gram(s) IV Push once  dextrose 50% Injectable 25 Gram(s) IV Push once  epoetin shakila-epbx (RETACRIT) Injectable 90500 Unit(s) IV Push <User Schedule>  famotidine  Oral Tab/Cap - Peds 20 milliGRAM(s) Oral at bedtime  fenofibrate Tablet 145 milliGRAM(s) Oral daily  insulin lispro (HumaLOG) corrective regimen sliding scale   SubCutaneous three times a day before meals  levothyroxine 75 MICROGram(s) Oral daily  melatonin 5 milliGRAM(s) Oral at bedtime  metoprolol tartrate 25 milliGRAM(s) Oral two times a day  pantoprazole   Suspension 40 milliGRAM(s) Oral daily  sevelamer carbonate 800 milliGRAM(s) Oral three times a day  tamsulosin 0.4 milliGRAM(s) Oral at bedtime    MEDICATIONS  (PRN):  acetaminophen   Tablet .. 650 milliGRAM(s) Oral once PRN Moderate Pain (4 - 6)  dextrose 40% Gel 15 Gram(s) Oral once PRN Blood Glucose LESS THAN 70 milliGRAM(s)/deciliter  glucagon  Injectable 1 milliGRAM(s) IntraMuscular once PRN Glucose LESS THAN 70 milligrams/deciliter  polyethylene glycol 3350 17 Gram(s) Oral daily PRN Constipation      RADIOLOGY & ADDITIONAL TESTS: CC: Syncope    INTERVAL HPI/OVERNIGHT EVENTS: Patient seen and examined. C/O right arm discomfort at wound site. Denies chest pain, SOB, dizziness, nausea, vomiting, fever, chills. Still urinates and had some discomfort as per RN.     Vital Signs Last 24 Hrs  T(C): 36.4 (11 Jun 2020 07:33), Max: 36.9 (10 Remberto 2020 21:57)  T(F): 97.5 (11 Jun 2020 07:33), Max: 98.4 (10 Remberto 2020 21:57)  HR: 76 (11 Jun 2020 07:33) (76 - 93)  BP: 108/69 (11 Jun 2020 07:33) (108/69 - 146/84)  BP(mean): --  RR: 18 (11 Jun 2020 07:33) (16 - 20)  SpO2: 93% (11 Jun 2020 07:33) (93% - 100%)    PHYSICAL EXAM:  General: Appears well developed, well nourished alert and cooperative.  HEENT: Ecchymosis left temporal area, brow bone  Eyes: Pupils reactive, cornea wnl.  Neck: Supple, no nodes adenopathy, no JVD  CARDIOVASCULAR: +s1/s2  LUNGS: No rales, rhonchi or wheeze. Normal breath sounds bilaterally.  ABDOMEN: Soft, nontender without mass or organomegaly. bowel sounds normoactive.  EXTREMITIES: No clubbing, cyanosis or edema. Distal pulses wnl.   SKIN: Skin tears/open areas right arm, right wrist, left hand  NEURO: Alert/oriented x 3/normal motor exam. No pathologic reflexes.    PSYCH: normal affect.      I&O's Detail    10 Remberto 2020 07:01  -  11 Jun 2020 07:00  --------------------------------------------------------  IN:    Oral Fluid: 240 mL  Total IN: 240 mL    OUT:    Other: 1500 mL    Voided: 50 mL  Total OUT: 1550 mL    Total NET: -1310 mL    CARDIAC MARKERS ( 10 Remberto 2020 09:27 )  x     / 0.05 ng/mL / x     / x     / x      CARDIAC MARKERS ( 09 Jun 2020 21:06 )  x     / 0.05 ng/mL / x     / x     / x                             10.1   5.62  )-----------( 103      ( 11 Jun 2020 06:00 )             32.5     11 Jun 2020 06:00    136    |  99     |  18.0   ----------------------------<  97     3.9     |  27.0   |  3.52     Ca    8.5        11 Jun 2020 06:00  Phos  3.0       10 Remberto 2020 09:27  Mg     2.1       11 Jun 2020 06:00    TPro  6.0    /  Alb  2.9    /  TBili  0.4    /  DBili  x      /  AST  42     /  ALT  26     /  AlkPhos  77     10 Remberto 2020 09:27    CAPILLARY BLOOD GLUCOSE  114 (10 Remberto 2020 11:30)    POCT Blood Glucose.: 114 mg/dL (10 Remberto 2020 18:23)    LIVER FUNCTIONS - ( 10 Remberto 2020 09:27 )  Alb: 2.9 g/dL / Pro: 6.0 g/dL / ALK PHOS: 77 U/L / ALT: 26 U/L / AST: 42 U/L / GGT: x           MEDICATIONS  (STANDING):  chlorhexidine 2% Cloths 1 Application(s) Topical once  cholecalciferol 1000 Unit(s) Oral daily  dextrose 5%. 1000 milliLiter(s) (50 mL/Hr) IV Continuous <Continuous>  dextrose 50% Injectable 12.5 Gram(s) IV Push once  dextrose 50% Injectable 25 Gram(s) IV Push once  dextrose 50% Injectable 25 Gram(s) IV Push once  epoetin shakila-epbx (RETACRIT) Injectable 85069 Unit(s) IV Push <User Schedule>  famotidine  Oral Tab/Cap - Peds 20 milliGRAM(s) Oral at bedtime  fenofibrate Tablet 145 milliGRAM(s) Oral daily  insulin lispro (HumaLOG) corrective regimen sliding scale   SubCutaneous three times a day before meals  levothyroxine 75 MICROGram(s) Oral daily  melatonin 5 milliGRAM(s) Oral at bedtime  metoprolol tartrate 25 milliGRAM(s) Oral two times a day  pantoprazole   Suspension 40 milliGRAM(s) Oral daily  sevelamer carbonate 800 milliGRAM(s) Oral three times a day  tamsulosin 0.4 milliGRAM(s) Oral at bedtime    MEDICATIONS  (PRN):  acetaminophen   Tablet .. 650 milliGRAM(s) Oral once PRN Moderate Pain (4 - 6)  dextrose 40% Gel 15 Gram(s) Oral once PRN Blood Glucose LESS THAN 70 milliGRAM(s)/deciliter  glucagon  Injectable 1 milliGRAM(s) IntraMuscular once PRN Glucose LESS THAN 70 milligrams/deciliter  polyethylene glycol 3350 17 Gram(s) Oral daily PRN Constipation    RADIOLOGY & ADDITIONAL TESTS:  < from: Xray Chest 1 View-PORTABLE IMMEDIATE (06.09.20 @ 20:31) >  Impression: Cardiomegaly. Dialysis catheter. TAVR.Obscuration of the left CP angle may represent small pleural effusion  < end of copied text >    < from: CT Head No Cont (06.09.20 @ 20:14) >  Impression:  1. Unremarkable noncontrast CT scan of the brain.  < end of copied text >

## 2020-06-11 NOTE — PROGRESS NOTE ADULT - ASSESSMENT
84 yo man with ESRD post recent hosp for GIB now positive for COVID 19, anemia.    - ESRD  - TTS scheduled; HD today post CT scan  - UF as tolerated.      Anemia of CKD with superimposed GIB  - h/h stable   -JARED with HD    CKD-MBD  Continue phos binders with meals  monitor Ca++ and phos    HTN  BP stable  Monitor BP 84 yo man with ESRD post recent hosp for GIB now positive for COVID 19, anemia.    - ESRD  - MWF schedule  - UF as tolerated.      Anemia of CKD with superimposed GIB  - h/h stable   -JARED with HD    CKD-MBD  Continue phos binders with meals  monitor Ca++ and phos    HTN  BP stable  Monitor BP 86y/o M with PMHx of Afib, Aortic valve stenosis, BPH, CAD, CHF, Cutaneous T-cell lymphoma, DM, ESRD on dialysis, Gout, HTN, LBBB, OAB, Squamous cell carcinoma of skin and TIA presented to the ED s/p fall. Pt was recently d/c from [facility] with discontinuation of  Eliquis and aspirin  due to GI bleed/Acute blood loss anemia / recurrent AVM bleed: S/P enteroscopy: jejunal bleeding AVM, cauterized on 5/26 . Pt was COVID + 2 weeks ago; repeat COVID testing positive.       - ESRD  - MWF schedule  - UF as tolerated.      Anemia of CKD with superimposed GIB  - h/h stable   -JARED with HD    CKD-MBD  Continue phos binders with meals  monitor Ca++ and phos    HTN  BP stable  Monitor BP

## 2020-06-11 NOTE — PROGRESS NOTE ADULT - ASSESSMENT
84y/o M with PMHx of Afib, Aortic valve stenosis, BPH, CAD, CHF, Cutaneous T-cell lymphoma, DM, ESRD on dialysis, Gout, HTN, LBBB, OAB, Squamous cell carcinoma of skin and TIA presented to the ED s/p falling forward and hitting right side of head after standing up from recline. According to EMS, fall was witnessed who family who was watching TV with pt when he fell, they tried calling his name and he was unresponsive for ~10 seconds. Pt  recently d/c from Eliquis and aspirin  due to GI bleed/Acute blood loss anemia  concerning recurrent AVM bleed: S/P enteroscopy: jejunal bleeding AVM, cauterized on 5/26 . Pt was COVID + 2 weeks ago.        1) Syncope  - CT brain negative  - Cardiology following  -continue CM  -EP eval  -recent COVID infection off AC, CT angio chest to R/O PE  -check UA/CS    2) ESRD on HD  - renal following and on HD- HD today post CT, normal schecule is M-W-F    3) Afib  - off AC due to GI bleed  - continue metoprolol, rate controlled     4) HLD/CAD/CHF  - on fenofibrate, metoprolol   - cardio following    5) Hypothyroidism   - on synthroid      6) DVT ppx  -recent GI bleed with asa/Eliquis on hold, ICD's ordered    7)Anemia  -recent GI bleed, now off AC/asa- monitor H&H  -protonox    Dispo: PT recommend eventual home 84y/o M with PMHx of Afib, Aortic valve stenosis, BPH, CAD, CHF, Cutaneous T-cell lymphoma, DM, ESRD on dialysis, Gout, HTN, LBBB, OAB, Squamous cell carcinoma of skin and TIA presented to the ED s/p falling forward and hitting right side of head after standing up from recline. According to EMS, fall was witnessed who family who was watching TV with pt when he fell, they tried calling his name and he was unresponsive for ~10 seconds. Pt  recently d/c from Eliquis and aspirin  due to GI bleed/Acute blood loss anemia  concerning recurrent AVM bleed: S/P enteroscopy: jejunal bleeding AVM, cauterized on 5/26 . Pt was COVID + 2 weeks ago.        1) Syncope  - CT brain negative  - Cardiology following  -continue CM  -EP eval  -recent COVID infection off AC, CT angio chest to R/O PE  -check UA/CS  -Neurology consult    2) ESRD on HD  - renal following and on HD- HD today post CT, normal schecule is M-W-F    3) Afib  - off AC due to GI bleed  - continue metoprolol, rate controlled     4) HLD/CAD/CHF  - on fenofibrate, metoprolol   - cardio following    5) Hypothyroidism   - on synthroid      6) DVT ppx  -recent GI bleed with asa/Eliquis on hold, ICD's ordered    7)Anemia  -recent GI bleed, now off AC/asa- monitor H&H  -protonox    Dispo: PT recommend eventual home 84y/o M with PMHx of Afib, Aortic valve stenosis, BPH, CAD, CHF, Cutaneous T-cell lymphoma, DM, ESRD on dialysis, Gout, HTN, LBBB, OAB, Squamous cell carcinoma of skin and TIA presented to the ED s/p falling forward and hitting right side of head after standing up from recline. According to EMS, fall was witnessed who family who was watching TV with pt when he fell, they tried calling his name and he was unresponsive for ~10 seconds. Pt  recently d/c from [facility] with discontinuation of  Eliquis and aspirin  due to GI bleed/Acute blood loss anemia / recurrent AVM bleed: S/P enteroscopy: jejunal bleeding AVM, cauterized on 5/26 . Pt was COVID + 2 weeks ago.        1) Syncope  - CT brain negative  - Cardiology following  -continue CM  -EP eval  -recent COVID infection off AC, CT angio chest to R/O PE  -check UA/CS  -Neurology consult    2) ESRD on HD  - renal following and on HD- HD today post CT, normal schedule is M-W-F    3) Afib  - off AC due to GI bleed  - continue metoprolol, rate controlled     4) HLD/CAD/CHF  - on fenofibrate, metoprolol   - cardio following    5) Hypothyroidism   - on synthroid      6) DVT ppx  -recent GI bleed with asa/Eliquis on hold, ICD's ordered    7)Anemia  -recent GI bleed, now off AC/asa- monitor H&H  -protonix    8)Fall/right arm pain  -check xray  -local wound care    Dispo: PT recommend eventual home 86y/o M with PMHx of Afib, Aortic valve stenosis, BPH, CAD, CHF, Cutaneous T-cell lymphoma, DM, ESRD on dialysis, Gout, HTN, LBBB, OAB, Squamous cell carcinoma of skin and TIA presented to the ED s/p falling forward and hitting right side of head after standing up from recline. According to EMS, fall was witnessed who family who was watching TV with pt when he fell, they tried calling his name and he was unresponsive for ~10 seconds. Pt  recently d/c from [facility] with discontinuation of  Eliquis and aspirin  due to GI bleed/Acute blood loss anemia / recurrent AVM bleed: S/P enteroscopy: jejunal bleeding AVM, cauterized on 5/26 . Pt was COVID + 2 weeks ago.    1) Syncope  - CT brain negative  - Cardiology following  - continue CM  - EP eval  - recent COVID infection off AC, CT angio chest to R/O PE  - check UA/CS  - Neurology consult    2) ESRD on HD  - renal following and on HD- HD today post CT, normal schedule is M-W-F    3) Afib  - off AC due to GI bleed  - continue metoprolol, rate controlled     4) HLD/CAD/CHF  - on fenofibrate, metoprolol   - cardio following    5) Hypothyroidism   - on synthroid    6) DVT ppx  -recent GI bleed with asa/Eliquis on hold, ICD's ordered    7)Anemia  -recent GI bleed, now off AC/asa- monitor H&H  -protonix    8)Fall/right arm pain  -check xray  -local wound care    Dispo: PT recommend eventual home 84y/o M with PMHx of Afib, Aortic valve stenosis, BPH, CAD, CHF, Cutaneous T-cell lymphoma, DM, ESRD on dialysis, Gout, HTN, LBBB, OAB, Squamous cell carcinoma of skin and TIA presented to the ED s/p falling forward and hitting right side of head after standing up from recline. According to EMS, fall was witnessed who family who was watching TV with pt when he fell, they tried calling his name and he was unresponsive for ~10 seconds. Pt  recently d/c from [facility] with discontinuation of  Eliquis and aspirin  due to GI bleed/Acute blood loss anemia / recurrent AVM bleed: S/P enteroscopy: jejunal bleeding AVM, cauterized on 5/26 . Pt was COVID + 2 weeks ago.    1) Syncope  - CT brain negative  - Cardiology following  - continue CM  - EP eval  - recent COVID infection off AC, CT angio chest to R/O PE (d/w Dr Talavera nephrology)  - check UA/CS  - Neurology consult    2) ESRD on HD  - renal following and on HD- HD today post CT, normal schedule is M-W-F    3) Afib  - off AC due to GI bleed  - continue metoprolol, rate controlled     4) HLD/CAD/CHF  - on fenofibrate, metoprolol   - cardio following    5) Hypothyroidism   - on synthroid    6) DVT ppx  -recent GI bleed with asa/Eliquis on hold, ICD's ordered    7)Anemia  -recent GI bleed, now off AC/asa- monitor H&H  -protonix    8)Fall/right arm pain  -check xray  -local wound care    Dispo: PT recommend eventual home

## 2020-06-11 NOTE — CONSULT NOTE ADULT - SUBJECTIVE AND OBJECTIVE BOX
[facility]                                     [facility] [place]                                 David Tavarez, & Osvaldo                                  [address]                                        [place], [place], [address]                                             [phone]    CC: syncope  HPI:  HPI: The patient is a 85y Male who presented with fall.  He says he slipped without LOC, per chart his daughter witnessed two brief episodes of unresponsiveness (5 and 10 seconds) with m,ild confusion after falling.  Currently he denies any neurological symptoms.  Neurology is asked to evaluate.    PAST MEDICAL & SURGICAL HISTORY:  Afib  TIA (transient ischemic attack)  CAD (coronary artery disease)  OAB (overactive bladder)  Gout  LBBB (left bundle branch block)  CHF (congestive heart failure)  Aortic valve stenosis  Squamous cell carcinoma of skin  Pleural effusion on left  SANG (obstructive sleep apnea)  ESRD on dialysis  BPH (benign prostatic hyperplasia)  Cutaneous T-cell lymphoma  Hypothyroid  Hypercholesteremia  DM (diabetes mellitus)  HTN (hypertension)  H/O eye surgery  S/P coronary artery stent placement  S/P TAVR (transcatheter aortic valve replacement)  H/O hernia repair      MEDICATIONS  (STANDING):  chlorhexidine 2% Cloths 1 Application(s) Topical once  cholecalciferol 1000 Unit(s) Oral daily  epoetin shakila-epbx (RETACRIT) Injectable 24118 Unit(s) IV Push <User Schedule>  famotidine  Oral Tab/Cap - Peds 20 milliGRAM(s) Oral at bedtime  fenofibrate Tablet 145 milliGRAM(s) Oral daily  levothyroxine 75 MICROGram(s) Oral daily  melatonin 5 milliGRAM(s) Oral at bedtime  metoprolol tartrate 25 milliGRAM(s) Oral two times a day  pantoprazole   Suspension 40 milliGRAM(s) Oral daily  sevelamer carbonate 800 milliGRAM(s) Oral three times a day  tamsulosin 0.4 milliGRAM(s) Oral at bedtime    MEDICATIONS  (PRN):  acetaminophen   Tablet .. 650 milliGRAM(s) Oral once PRN Moderate Pain (4 - 6)  polyethylene glycol 3350 17 Gram(s) Oral daily PRN Constipation      Allergies    No Known Allergies    Intolerances        SOCIAL HISTORY:  no tob,   no alcohol   no drugs    FAMILY HISTORY:  No styroke n either parent    ROS: 14 point ROS negative other than what is present in HPI or below  ? syncope    Vital Signs Last 24 Hrs  T(C): 36.7 (11 Jun 2020 16:00), Max: 36.9 (10 Remberto 2020 21:57)  T(F): 98 (11 Jun 2020 16:00), Max: 98.4 (10 Remberto 2020 21:57)  HR: 90 (11 Jun 2020 16:00) (76 - 90)  BP: 147/77 (11 Jun 2020 16:00) (108/69 - 147/77)  BP(mean): --  RR: 17 (11 Jun 2020 16:00) (16 - 20)  SpO2: 98% (11 Jun 2020 16:00) (93% - 100%)      General: NAD    Detailed Neurologic Exam (mostly in English, occasional Spamish):    Mental status: The patient is awake and alert and has normal attention span.  The patient is fully oriented in 3 spheres. The patient is oriented to current events. The patient is able to name objects and follow commands,.    Cranial nerves: Pupils equal and react symmetrically to light. There is no visual field deficit to confrontation. Extraocular motion is full with no nystagmus. There is no ptosis. Facial sensation is intact. Facial musculature is symmetric. Palate elevates symmetrically. Shoulder shrug is normal. Tongue is midline.    Motor: There is normal bulk and tone.  There is no tremor.  Strength is 5/5 in the right arm and leg.   Strength is 5/5 in the left arm and leg.    Sensation: Intact to light touch and pin in 4 extremities    Reflexes: 1+ throughout and plantar responses are flexor.    Cerebellar: There is no dysmetria on finger to nose testing.    Gait : deferred    LABS:                         10.1   5.62  )-----------( 103      ( 11 Jun 2020 06:00 )             32.5       06-11    136  |  99  |  18.0  ----------------------------<  97  3.9   |  27.0  |  3.52<H>    Ca    8.5<L>      11 Jun 2020 06:00  Phos  3.0     06-10  Mg     2.1     06-11    TPro  6.0<L>  /  Alb  2.9<L>  /  TBili  0.4  /  DBili  x   /  AST  42<H>  /  ALT  26  /  AlkPhos  77  06-10    < from: TTE Echo Complete w/o contrast w/ Doppler (05.11.20 @ 14:19) >   Summary     Mild concentric left ventricular hypertrophy is present.   Estimated left ventricular ejection fraction is 60 %.   The left atrium is moderately dilated.   The right atrium appears moderately dilated.   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 Peak   trans-prosthetic gradient is within normal limitations for this type of   prosthesis.   Mild mitral annular calcification is present.   Fibrocalcific changes noted to the mitral valve leaflets with preserved   leaflet excursion.   Moderate (2+) mitral regurgitation is present.   The tricuspid valve leaflets are thin and pliable; valve motion is normal.   Moderate (2+) tricuspid valve regurgitation is present.   Mild to moderate pulmonary hypertension.   Mild pulmonic valvular regurgitation (1+) is present.   The IVC is dilated with decreased respiratory variation        RADIOLOGY & ADDITIONAL STUDIES (independently reviewed unless otherwise noted):  head CT- no acute CVA, mass or blood

## 2020-06-11 NOTE — PROGRESS NOTE ADULT - SUBJECTIVE AND OBJECTIVE BOX
[facility] DIVISION OF KIDNEY DISEASES AND HYPERTENSION -- HEMODIALYSIS NOTE  --------------------------------------------------------------------------------  Chief Complaint: ESRD/Ongoing hemodialysis requirement    24 hour events/subjective:        PAST HISTORY  --------------------------------------------------------------------------------  No significant changes to PMH, PSH, FHx, SHx, unless otherwise noted    ALLERGIES & MEDICATIONS  --------------------------------------------------------------------------------  Allergies    No Known Allergies    Intolerances      Standing Inpatient Medications  chlorhexidine 2% Cloths 1 Application(s) Topical once  cholecalciferol 1000 Unit(s) Oral daily  dextrose 5%. 1000 milliLiter(s) IV Continuous <Continuous>  dextrose 50% Injectable 12.5 Gram(s) IV Push once  dextrose 50% Injectable 25 Gram(s) IV Push once  dextrose 50% Injectable 25 Gram(s) IV Push once  epoetin shakila-epbx (RETACRIT) Injectable 10850 Unit(s) IV Push <User Schedule>  famotidine  Oral Tab/Cap - Peds 20 milliGRAM(s) Oral at bedtime  fenofibrate Tablet 145 milliGRAM(s) Oral daily  insulin lispro (HumaLOG) corrective regimen sliding scale   SubCutaneous three times a day before meals  levothyroxine 75 MICROGram(s) Oral daily  melatonin 5 milliGRAM(s) Oral at bedtime  metoprolol tartrate 25 milliGRAM(s) Oral two times a day  pantoprazole   Suspension 40 milliGRAM(s) Oral daily  sevelamer carbonate 800 milliGRAM(s) Oral three times a day  tamsulosin 0.4 milliGRAM(s) Oral at bedtime    PRN Inpatient Medications  acetaminophen   Tablet .. 650 milliGRAM(s) Oral once PRN  dextrose 40% Gel 15 Gram(s) Oral once PRN  glucagon  Injectable 1 milliGRAM(s) IntraMuscular once PRN  polyethylene glycol 3350 17 Gram(s) Oral daily PRN      REVIEW OF SYSTEMS  --------------------------------------------------------------------------------  Gen: No weight changes, fatigue, fevers/chills, weakness  Skin: No rashes  Head/Eyes/Ears/Mouth: No headache  Respiratory: No dyspnea, cough,  CV: No chest pain, orthopnea  GI: No abdominal pain, diarrhea, constipation, nausea, vomiting,  MSK: No joint pain  Neuro: No dizziness/lightheadedness, weakness  Heme: No bleeding  Psych: No significant nervousness, anxiety, stress, depression    All other systems were reviewed and are negative, except as noted.    VITALS/PHYSICAL EXAM  --------------------------------------------------------------------------------  T(C): 36.4 (06-11-20 @ 07:33), Max: 36.9 (06-10-20 @ 21:57)  HR: 76 (06-11-20 @ 07:33) (76 - 93)  BP: 108/69 (06-11-20 @ 07:33) (108/69 - 146/84)  RR: 18 (06-11-20 @ 07:33) (16 - 20)  SpO2: 93% (06-11-20 @ 07:33) (93% - 100%)  Wt(kg): --  Height (cm): 160.02 (06-09-20 @ 19:59)  Weight (kg): 72.6 (06-09-20 @ 19:59)  BMI (kg/m2): 28.4 (06-09-20 @ 19:59)  BSA (m2): 1.76 (06-09-20 @ 19:59)      06-10-20 @ 07:01  -  06-11-20 @ 07:00  --------------------------------------------------------  IN: 240 mL / OUT: 1550 mL / NET: -1310 mL      Physical Exam:  	Gen: NAD, well-appearing  	HEENT: PERRL, supple neck,  	Pulm: CTA B/L  	CV: RRR, S1S2; no rub  	Abd: +BS, soft, nontender  	UE: Warm, intact strength; no asterixis  	LE: Warm, + edema  	Neuro: No focal deficits  	Psych: Normal affect and mood  	Skin: Warm, without rashes  	Vascular access:    LABS/STUDIES  --------------------------------------------------------------------------------              10.1   5.62  >-----------<  103      [06-11-20 @ 06:00]              32.5     136  |  99  |  18.0  ----------------------------<  97      [06-11-20 @ 06:00]  3.9   |  27.0  |  3.52        Ca     8.5     [06-11-20 @ 06:00]      Mg     2.1     [06-11-20 @ 06:00]      Phos  3.0     [06-10-20 @ 09:27]    TPro  6.0  /  Alb  2.9  /  TBili  0.4  /  DBili  x   /  AST  42  /  ALT  26  /  AlkPhos  77  [06-10-20 @ 09:27]        Troponin 0.05      [06-10-20 @ 09:27]    HbA1c 5.2      [03-13-16 @ 00:28]  TSH 4.80      [06-11-20 @ 06:00]    HBsAg Nonreact      [05-23-20 @ 11:48]  HCV 0.16, Nonreact      [05-23-20 @ 11:48] [facility] DIVISION OF KIDNEY DISEASES AND HYPERTENSION -- HEMODIALYSIS NOTE  --------------------------------------------------------------------------------  Chief Complaint: ESRD/Ongoing hemodialysis requirement    24 hour events/subjective:  No acute event noted  pt has no new complaint      PAST HISTORY  --------------------------------------------------------------------------------  No significant changes to PMH, PSH, FHx, SHx, unless otherwise noted    ALLERGIES & MEDICATIONS  --------------------------------------------------------------------------------  Allergies    No Known Allergies    Intolerances      Standing Inpatient Medications  chlorhexidine 2% Cloths 1 Application(s) Topical once  cholecalciferol 1000 Unit(s) Oral daily  dextrose 5%. 1000 milliLiter(s) IV Continuous <Continuous>  dextrose 50% Injectable 12.5 Gram(s) IV Push once  dextrose 50% Injectable 25 Gram(s) IV Push once  dextrose 50% Injectable 25 Gram(s) IV Push once  epoetin shakila-epbx (RETACRIT) Injectable 85887 Unit(s) IV Push <User Schedule>  famotidine  Oral Tab/Cap - Peds 20 milliGRAM(s) Oral at bedtime  fenofibrate Tablet 145 milliGRAM(s) Oral daily  insulin lispro (HumaLOG) corrective regimen sliding scale   SubCutaneous three times a day before meals  levothyroxine 75 MICROGram(s) Oral daily  melatonin 5 milliGRAM(s) Oral at bedtime  metoprolol tartrate 25 milliGRAM(s) Oral two times a day  pantoprazole   Suspension 40 milliGRAM(s) Oral daily  sevelamer carbonate 800 milliGRAM(s) Oral three times a day  tamsulosin 0.4 milliGRAM(s) Oral at bedtime    PRN Inpatient Medications  acetaminophen   Tablet .. 650 milliGRAM(s) Oral once PRN  dextrose 40% Gel 15 Gram(s) Oral once PRN  glucagon  Injectable 1 milliGRAM(s) IntraMuscular once PRN  polyethylene glycol 3350 17 Gram(s) Oral daily PRN      REVIEW OF SYSTEMS  --------------------------------------------------------------------------------  Gen: No weight changes, fatigue, fevers/chills, weakness  Skin: No rashes  Head/Eyes/Ears/Mouth: No headache  Respiratory: No dyspnea, cough,  CV: No chest pain, orthopnea  GI: No abdominal pain, diarrhea, constipation, nausea, vomiting,  MSK: No joint pain  Neuro: No dizziness/lightheadedness, weakness  Heme: No bleeding  Psych: No significant nervousness, anxiety, stress, depression    All other systems were reviewed and are negative, except as noted.    VITALS/PHYSICAL EXAM  --------------------------------------------------------------------------------  T(C): 36.4 (06-11-20 @ 07:33), Max: 36.9 (06-10-20 @ 21:57)  HR: 76 (06-11-20 @ 07:33) (76 - 93)  BP: 108/69 (06-11-20 @ 07:33) (108/69 - 146/84)  RR: 18 (06-11-20 @ 07:33) (16 - 20)  SpO2: 93% (06-11-20 @ 07:33) (93% - 100%)  Wt(kg): --  Height (cm): 160.02 (06-09-20 @ 19:59)  Weight (kg): 72.6 (06-09-20 @ 19:59)  BMI (kg/m2): 28.4 (06-09-20 @ 19:59)  BSA (m2): 1.76 (06-09-20 @ 19:59)      06-10-20 @ 07:01  -  06-11-20 @ 07:00  --------------------------------------------------------  IN: 240 mL / OUT: 1550 mL / NET: -1310 mL      Physical Exam:  	Gen: NAD    	Pulm: CTA B/L  	CV: RRR, S1S2; no rub  	Abd: +BS, soft, nontender  	UE: Warm, intact strength; no asterixis  	LE: Warm, no edema  	Neuro: No focal deficits  	Psych: Normal affect and mood  	Skin: Warm,  	Vascular access: IJ CVC    LABS/STUDIES  --------------------------------------------------------------------------------              10.1   5.62  >-----------<  103      [06-11-20 @ 06:00]              32.5     136  |  99  |  18.0  ----------------------------<  97      [06-11-20 @ 06:00]  3.9   |  27.0  |  3.52        Ca     8.5     [06-11-20 @ 06:00]      Mg     2.1     [06-11-20 @ 06:00]      Phos  3.0     [06-10-20 @ 09:27]    TPro  6.0  /  Alb  2.9  /  TBili  0.4  /  DBili  x   /  AST  42  /  ALT  26  /  AlkPhos  77  [06-10-20 @ 09:27]        Troponin 0.05      [06-10-20 @ 09:27]    HbA1c 5.2      [03-13-16 @ 00:28]  TSH 4.80      [06-11-20 @ 06:00]    HBsAg Nonreact      [05-23-20 @ 11:48]  HCV 0.16, Nonreact      [05-23-20 @ 11:48]

## 2020-06-11 NOTE — CHART NOTE - NSCHARTNOTEFT_GEN_A_CORE
called by radiology fro possible thrombus near svc near thrombus as below    < from: CT Angio Chest w/ IV Cont (06.11.20 @ 15:25) >  IMPRESSION:   No pulmonary embolism.  Right right IJ central line with probable pericatheter thrombus; confirmation is recommended with a chest CT with intravenous contrast performed with a routine postcontrast delay  Small bilateral pleural effusions.  Nonspecific patchy groundglass opacities with the differential including multifocal infection/inflammation or pulmonary edema.  Bronchial wall thickening with scattered foci of mucous plugging.  < end of copied text >    as reviewed with radiology there may be chance that read may be due to artifact so will order ct chest iv contrast d/w Dr Blanco    given patient esrd d/w Dr Talavera can obtain as patient will have dialysis     if svc thrombus is found to be present will consult vascular surgery

## 2020-06-12 ENCOUNTER — TRANSCRIPTION ENCOUNTER (OUTPATIENT)
Age: 85
End: 2020-06-12

## 2020-06-12 LAB
ANION GAP SERPL CALC-SCNC: 10 MMOL/L — SIGNIFICANT CHANGE UP (ref 5–17)
BUN SERPL-MCNC: 29 MG/DL — HIGH (ref 8–20)
CALCIUM SERPL-MCNC: 8.7 MG/DL — SIGNIFICANT CHANGE UP (ref 8.6–10.2)
CHLORIDE SERPL-SCNC: 96 MMOL/L — LOW (ref 98–107)
CO2 SERPL-SCNC: 26 MMOL/L — SIGNIFICANT CHANGE UP (ref 22–29)
CREAT SERPL-MCNC: 5.11 MG/DL — HIGH (ref 0.5–1.3)
CULTURE RESULTS: SIGNIFICANT CHANGE UP
GLUCOSE SERPL-MCNC: 95 MG/DL — SIGNIFICANT CHANGE UP (ref 70–99)
HCT VFR BLD CALC: 32.2 % — LOW (ref 39–50)
HGB BLD-MCNC: 9.9 G/DL — LOW (ref 13–17)
MAGNESIUM SERPL-MCNC: 2.2 MG/DL — SIGNIFICANT CHANGE UP (ref 1.6–2.6)
MCHC RBC-ENTMCNC: 30.5 PG — SIGNIFICANT CHANGE UP (ref 27–34)
MCHC RBC-ENTMCNC: 30.7 GM/DL — LOW (ref 32–36)
MCV RBC AUTO: 99.1 FL — SIGNIFICANT CHANGE UP (ref 80–100)
PHOSPHATE SERPL-MCNC: 3.9 MG/DL — SIGNIFICANT CHANGE UP (ref 2.4–4.7)
PLATELET # BLD AUTO: 111 K/UL — LOW (ref 150–400)
POTASSIUM SERPL-MCNC: 4.2 MMOL/L — SIGNIFICANT CHANGE UP (ref 3.5–5.3)
POTASSIUM SERPL-SCNC: 4.2 MMOL/L — SIGNIFICANT CHANGE UP (ref 3.5–5.3)
RBC # BLD: 3.25 M/UL — LOW (ref 4.2–5.8)
RBC # FLD: 20 % — HIGH (ref 10.3–14.5)
SODIUM SERPL-SCNC: 132 MMOL/L — LOW (ref 135–145)
SPECIMEN SOURCE: SIGNIFICANT CHANGE UP
WBC # BLD: 5.26 K/UL — SIGNIFICANT CHANGE UP (ref 3.8–10.5)
WBC # FLD AUTO: 5.26 K/UL — SIGNIFICANT CHANGE UP (ref 3.8–10.5)

## 2020-06-12 PROCEDURE — 99232 SBSQ HOSP IP/OBS MODERATE 35: CPT

## 2020-06-12 PROCEDURE — 90937 HEMODIALYSIS REPEATED EVAL: CPT

## 2020-06-12 PROCEDURE — 95819 EEG AWAKE AND ASLEEP: CPT | Mod: 26

## 2020-06-12 PROCEDURE — 99233 SBSQ HOSP IP/OBS HIGH 50: CPT

## 2020-06-12 RX ORDER — PHENAZOPYRIDINE HCL 100 MG
100 TABLET ORAL EVERY 8 HOURS
Refills: 0 | Status: DISCONTINUED | OUTPATIENT
Start: 2020-06-12 | End: 2020-06-13

## 2020-06-12 RX ADMIN — POLYETHYLENE GLYCOL 3350 17 GRAM(S): 17 POWDER, FOR SOLUTION ORAL at 01:31

## 2020-06-12 RX ADMIN — SEVELAMER CARBONATE 800 MILLIGRAM(S): 2400 POWDER, FOR SUSPENSION ORAL at 05:52

## 2020-06-12 RX ADMIN — TAMSULOSIN HYDROCHLORIDE 0.4 MILLIGRAM(S): 0.4 CAPSULE ORAL at 21:06

## 2020-06-12 RX ADMIN — ERYTHROPOIETIN 10000 UNIT(S): 10000 INJECTION, SOLUTION INTRAVENOUS; SUBCUTANEOUS at 17:22

## 2020-06-12 RX ADMIN — Medication 25 MILLIGRAM(S): at 05:52

## 2020-06-12 RX ADMIN — Medication 1000 UNIT(S): at 12:08

## 2020-06-12 RX ADMIN — Medication 5 MILLIGRAM(S): at 21:06

## 2020-06-12 RX ADMIN — Medication 100 MILLIGRAM(S): at 12:06

## 2020-06-12 RX ADMIN — PANTOPRAZOLE SODIUM 40 MILLIGRAM(S): 20 TABLET, DELAYED RELEASE ORAL at 12:07

## 2020-06-12 RX ADMIN — Medication 145 MILLIGRAM(S): at 12:07

## 2020-06-12 RX ADMIN — SEVELAMER CARBONATE 800 MILLIGRAM(S): 2400 POWDER, FOR SUSPENSION ORAL at 21:06

## 2020-06-12 RX ADMIN — FAMOTIDINE 20 MILLIGRAM(S): 10 INJECTION INTRAVENOUS at 21:06

## 2020-06-12 RX ADMIN — Medication 75 MICROGRAM(S): at 05:52

## 2020-06-12 RX ADMIN — SEVELAMER CARBONATE 800 MILLIGRAM(S): 2400 POWDER, FOR SUSPENSION ORAL at 12:07

## 2020-06-12 RX ADMIN — Medication 100 MILLIGRAM(S): at 21:06

## 2020-06-12 NOTE — PROGRESS NOTE ADULT - SUBJECTIVE AND OBJECTIVE BOX
[facility] DIVISION OF KIDNEY DISEASES AND HYPERTENSION -- FOLLOW UP NOTE  --------------------------------------------------------------------------------  Chief Complaint: ESRD/Ongoing hemodialysis requirement    24 hour events/subjective:  No acute event noted, pt. has no new complaints    PAST HISTORY  --------------------------------------------------------------------------------  No significant changes to PMH, PSH, FHx, SHx, unless otherwise noted    ALLERGIES & MEDICATIONS  --------------------------------------------------------------------------------  Allergies  No Known Allergies    Standing Inpatient Medications  chlorhexidine 2% Cloths 1 Application(s) Topical once  cholecalciferol 1000 Unit(s) Oral daily  epoetin shakila-epbx (RETACRIT) Injectable 47255 Unit(s) IV Push <User Schedule>  famotidine  Oral Tab/Cap - Peds 20 milliGRAM(s) Oral at bedtime  fenofibrate Tablet 145 milliGRAM(s) Oral daily  levothyroxine 75 MICROGram(s) Oral daily  melatonin 5 milliGRAM(s) Oral at bedtime  metoprolol tartrate 25 milliGRAM(s) Oral two times a day  pantoprazole   Suspension 40 milliGRAM(s) Oral daily  sevelamer carbonate 800 milliGRAM(s) Oral three times a day  tamsulosin 0.4 milliGRAM(s) Oral at bedtime    PRN Inpatient Medications  acetaminophen   Tablet .. 650 milliGRAM(s) Oral once PRN  polyethylene glycol 3350 17 Gram(s) Oral daily PRN    REVIEW OF SYSTEMS  --------------------------------------------------------------------------------  Gen: No fatigue, fevers/chills, weakness  Skin: No rashes  Head/Eyes/Ears/Mouth: No headache  Respiratory: No dyspnea, wheezing, hemoptysis  CV: No chest pain  GI: No, Nausea, abdominal pain, diarrhea, constipation, vomiting  : No changes in urination  MSK:  No joint pain/swelling; no back pain  Neuro: No dizziness/lightheadedness  Psych: No anxiety, depression    All other systems were reviewed and are negative, except as noted.    VITALS/PHYSICAL EXAM  --------------------------------------------------------------------------------  T(C): 36.4 (06-11-20 @ 19:50), Max: 36.7 (06-11-20 @ 16:00)  HR: 72 (06-12-20 @ 06:11) (72 - 90)  BP: 130/60 (06-12-20 @ 06:11) (130/60 - 147/77)  RR: 18 (06-12-20 @ 06:11) (17 - 18)  SpO2: 100% (06-12-20 @ 06:11) (98% - 100%)    06-11-20 @ 07:01  -  06-12-20 @ 07:00  --------------------------------------------------------  IN: 300 mL / OUT: 65 mL / NET: 235 mL    Physical Exam:  Gen: NAD  Pulm: CTA B/L  CV: RRR, S1S2; no rub  Abd: +BS, soft, nontender  UE: Warm, intact strength; no asterixis  LE: Warm, no edema  Neuro: No focal deficits  Psych: Normal affect and mood  Skin: Warm,  Vascular access: IJ CVC    LABS/STUDIES  --------------------------------------------------------------------------------              9.9    5.26  >-----------<  111      [06-12-20 @ 07:35]              32.2     132  |  96  |  29.0  ----------------------------<  95      [06-12-20 @ 07:35]  4.2   |  26.0  |  5.11        Ca     8.7     [06-12-20 @ 07:35]      Mg     2.2     [06-12-20 @ 07:35]      Phos  3.9     [06-12-20 @ 07:35]    TPro  6.0  /  Alb  2.9  /  TBili  0.4  /  DBili  x   /  AST  42  /  ALT  26  /  AlkPhos  77  [06-10-20 @ 09:27]    Troponin 0.05      [06-10-20 @ 09:27]    Creatinine Trend:  SCr 5.11 [06-12 @ 07:35]  SCr 3.52 [06-11 @ 06:00]  SCr 5.38 [06-10 @ 09:27]  SCr 4.68 [06-09 @ 21:06]  SCr 6.91 [05-30 @ 15:40]    Urinalysis - [06-11-20 @ 12:36]      Color Yellow / Appearance Clear / SG 1.010 / pH 8.0      Gluc Negative / Ketone Negative  / Bili Negative / Urobili Negative       Blood Negative / Protein 100 / Leuk Est Negative / Nitrite Negative      RBC Negative / WBC 3-5 / Hyaline  / Gran  / Sq Epi  / Non Sq Epi Occasional / Bacteria Negative    HbA1c 5.2      [03-13-16 @ 00:28]  TSH 4.80      [06-11-20 @ 06:00]    HBsAg Nonreact      [05-23-20 @ 11:48]  HCV 0.16, Nonreact      [05-23-20 @ 11:48]

## 2020-06-12 NOTE — PROGRESS NOTE ADULT - ASSESSMENT
ASSESSMENT AND PLAN:  In summary, TAMY WHITE is an 85y Male with past medical history significant for TAVR for Severe as, Afib, ESRD on HD, gi bleeds presents with syncopal event.    Syncope- no events on tele.  Agree with Ep to send patient home on 4 week MCOT on discharge.  Okay to discharge from my standpoint.    Afib- RVR up to 110s, can increase toprol to 75mg bid.  Goal is HR of     Thank you for allowing [facility] to participate in the care of this patient.  Please feel free to call with any questions or concerns.

## 2020-06-12 NOTE — OCCUPATIONAL THERAPY INITIAL EVALUATION ADULT - PHYSICAL ASSIST/NONPHYSICAL ASSIST:DRESS LOWER BODY, OT EVAL
Patient educated in LB dressing devices; pt deferred and states that at baseline he would have assist for pants, socks and shoes/1 person assist/verbal cues

## 2020-06-12 NOTE — OCCUPATIONAL THERAPY INITIAL EVALUATION ADULT - SPECIAL TRAINING, OT EVAL
Patient ambulated with modified independence and RW around bed area and to/from the bathroom demonstrating good safety awareness and obstacle negotiation. Patient educated in energy conservation techniques including proper breathing and activity pacing.

## 2020-06-12 NOTE — OCCUPATIONAL THERAPY INITIAL EVALUATION ADULT - LIVES WITH, PROFILE
children/Patient reports lives with his daughter and son in law in a private house. No SHMUEL and no steps inside to negotiate. Patient states his daughter and son in law work from home and are available 24/7 to assist upon discharge.

## 2020-06-12 NOTE — PROGRESS NOTE ADULT - ASSESSMENT
85y Male who is followed by neurology because of episode of unreposnsiveness    Syncope vs seizure  Await EEG  No antiseizure drug at this time.    Atrial fib  Not on anticoagulation due to recent GIB.  Can restart when cleared.

## 2020-06-12 NOTE — PROGRESS NOTE ADULT - ASSESSMENT
86y/o M with PMHx of Afib, Aortic valve stenosis, BPH, CAD, CHF, Cutaneous T-cell lymphoma, DM, ESRD on dialysis, Gout, HTN, LBBB, OAB, Squamous cell carcinoma of skin and TIA presented to the ED s/p falling forward and hitting right side of head after standing up from recline. According to EMS, fall was witnessed who family who was watching TV with pt when he fell, they tried calling his name and he was unresponsive for ~10 seconds. Pt  recently d/c from [facility] with discontinuation of  Eliquis and aspirin  due to GI bleed/Acute blood loss anemia / recurrent AVM bleed: S/P enteroscopy: jejunal bleeding AVM, cauterized on 5/26 . Pt was COVID + 2 weeks ago.    1) Syncope  - CT brain negative  - Cardiology input appreciated  - continue CM  - EP recommend  outpt Cardionet 30 day MCOT to r/o occult nicole  - CTA negative PE  -  UA negative  - Neurology input appreciated, awaiting EEG    2) ESRD on HD  - renal following - normal schedule is M-W-F    3) Afib  - off AC due to GI bleed  - continue metoprolol, rate controlled     4) HLD/CAD/CHF  - on fenofibrate, metoprolol   - cardio following    5) Hypothyroidism   - on synthroid    6) DVT ppx  -recent GI bleed with asa/Eliquis on hold, ICD's ordered    7)Anemia- H&H stable  -recent GI bleed, now off AC/asa- monitor H&H  -protonix    8)Fall/right arm pain  - xray- no fracture  -local wound care    9)Dysuria  -UA negative  -check bladder scan post void  -Pyridium x 3 days for probable cystitis     Dispo: PT recommend eventual home, probable dc in 24 hrs

## 2020-06-12 NOTE — OCCUPATIONAL THERAPY INITIAL EVALUATION ADULT - ADDITIONAL COMMENTS
Patient has a shower with doors and grab bars. Patient states owns a cane and commode. Patient is right handed. Per patient and EMR has an aid 5hrs/day, 7 days/week to assist with ADLs/transfers as needed. Confirm all information with social work including PLOF, set up of home, DME owned/used and level of assist available upon discharge.

## 2020-06-12 NOTE — OCCUPATIONAL THERAPY INITIAL EVALUATION ADULT - REHAB POTENTIAL, OT EVAL
Patient demonstrates modified independence with ADLs and functional transfers. Patient is at baseline for ADL of LB dressing./none

## 2020-06-12 NOTE — OCCUPATIONAL THERAPY INITIAL EVALUATION ADULT - DIAGNOSIS, OT EVAL
s/p syncope and fall. Patient presented to the ED s/p falling forward and hitting right side of head after standing up from recliner. According to EMS, fall was witnessed who family who was watching TV with pt when he fell, they tried calling his name and he was unresponsive for ~10 seconds. Pt was COVID + 2 weeks ago. s/p syncope and fall. Patient presented to the ED s/p falling forward and hitting right side of head after standing up from recliner. According to EMS, fall was witnessed by family who was watching TV with pt when he fell, they tried calling his name and he was unresponsive for ~10 seconds. Pt was COVID + 2 weeks ago.

## 2020-06-12 NOTE — PROGRESS NOTE ADULT - ATTENDING COMMENTS
I have personally seen and examined patient on the above date.  I discussed the case with RADHA Mae and I agree with findings and plan as detailed per note above, which I have amended where appropriate.     Spoke to Patient daughter Keerthi [phone] who also conferenced in daughter Merle and hospital course to date w/ imaging reviewed. probable d/c in am. eeg today and hd today. will need to follow up with EP Cardiology for cardionet outpatient
Spoke to patient daughter Argentina Edwards [phone] hospital course to date reviewed all questions answered

## 2020-06-12 NOTE — OCCUPATIONAL THERAPY INITIAL EVALUATION ADULT - GENERAL OBSERVATIONS, REHAB EVAL
Patient is Kinyarwanda speaking, use of ipad  for OT evaluation. Received patient in NAD, +isolation precautions, +Telemetry, +, IV lock, +breathing RA. Patient agreeable to OT evaluation.

## 2020-06-12 NOTE — PROGRESS NOTE ADULT - SUBJECTIVE AND OBJECTIVE BOX
[facility]                                   [address] [place] [address]                                                      PHONE: [phone]                                                         FAX: [phone]  [email or web address]  ---------------------------------------------------------------------------------------------------------------------------------    Overnight events/patient complaints:  No complaints.  No chest pain, palpitations, dyspnea.       PAST MEDICAL & SURGICAL HISTORY:  Afib  TIA (transient ischemic attack)  CAD (coronary artery disease)  OAB (overactive bladder)  Gout  LBBB (left bundle branch block)  CHF (congestive heart failure)  Aortic valve stenosis  Squamous cell carcinoma of skin  Pleural effusion on left  SANG (obstructive sleep apnea)  ESRD on dialysis  BPH (benign prostatic hyperplasia)  Cutaneous T-cell lymphoma  Hypothyroid  Hypercholesteremia  DM (diabetes mellitus)  HTN (hypertension)  H/O eye surgery  S/P coronary artery stent placement  S/P TAVR (transcatheter aortic valve replacement)  H/O hernia repair      No Known Allergies    MEDICATIONS  (STANDING):  chlorhexidine 2% Cloths 1 Application(s) Topical once  cholecalciferol 1000 Unit(s) Oral daily  epoetin shakila-epbx (RETACRIT) Injectable 29101 Unit(s) IV Push <User Schedule>  famotidine  Oral Tab/Cap - Peds 20 milliGRAM(s) Oral at bedtime  fenofibrate Tablet 145 milliGRAM(s) Oral daily  levothyroxine 75 MICROGram(s) Oral daily  melatonin 5 milliGRAM(s) Oral at bedtime  metoprolol tartrate 25 milliGRAM(s) Oral two times a day  pantoprazole   Suspension 40 milliGRAM(s) Oral daily  sevelamer carbonate 800 milliGRAM(s) Oral three times a day  tamsulosin 0.4 milliGRAM(s) Oral at bedtime    MEDICATIONS  (PRN):  acetaminophen   Tablet .. 650 milliGRAM(s) Oral once PRN Moderate Pain (4 - 6)  polyethylene glycol 3350 17 Gram(s) Oral daily PRN Constipation      Vital Signs Last 24 Hrs  T(C): 36.4 (2020 19:50), Max: 36.7 (2020 16:00)  T(F): 97.6 (2020 19:50), Max: 98 (2020 16:00)  HR: 72 (2020 06:11) (72 - 90)  BP: 130/60 (2020 06:11) (130/60 - 147/77)  BP(mean): --  RR: 18 (2020 06:11) (17 - 18)  SpO2: 100% (2020 06:11) (98% - 100%)  ICU Vital Signs Last 24 Hrs  TAMY WHITE  I&O's Detail    2020 07:01  -  2020 07:00  --------------------------------------------------------  IN:    Oral Fluid: 300 mL  Total IN: 300 mL    OUT:    Voided: 65 mL  Total OUT: 65 mL    Total NET: 235 mL        I&O's Summary    2020 07:01  -  2020 07:00  --------------------------------------------------------  IN: 300 mL / OUT: 65 mL / NET: 235 mL      Drug Dosing Weight  TAMY WHITE      PHYSICAL EXAM:  Physical exam deferred due to covid 19 pandemic        LABS:                        9.9    5.26  )-----------( 111      ( 2020 07:35 )             32.2     06-12    132<L>  |  96<L>  |  29.0<H>  ----------------------------<  95  4.2   |  26.0  |  5.11<H>    Ca    8.7      2020 07:35  Phos  3.9     06-12  Mg     2.2     06-12    TPro  6.0<L>  /  Alb  2.9<L>  /  TBili  0.4  /  DBili  x   /  AST  42<H>  /  ALT  26  /  AlkPhos  77  06-10    TAMY DELEONGREY  CARDIAC MARKERS ( 10 Remberto 2020 09:27 )  x     / 0.05 ng/mL / x     / x     / x            Urinalysis Basic - ( 2020 12:36 )    Color: Yellow / Appearance: Clear / S.010 / pH: x  Gluc: x / Ketone: Negative  / Bili: Negative / Urobili: Negative mg/dL   Blood: x / Protein: 100 mg/dL / Nitrite: Negative   Leuk Esterase: Negative / RBC: Negative /HPF / WBC 3-5   Sq Epi: x / Non Sq Epi: Occasional / Bacteria: Negative        RADIOLOGY & ADDITIONAL STUDIES:    INTERPRETATION OF TELEMETRY (personally reviewed): afib, rates up to 110s    ECG:    ECHO:ECHO: normal EF, normal TAVR, mod MR 2020    STRESS TEST:    CARDIAC CATHETERIZATION:

## 2020-06-12 NOTE — DISCHARGE NOTE NURSING/CASE MANAGEMENT/SOCIAL WORK - PATIENT PORTAL LINK FT
You can access the FollowMyHealth Patient Portal offered by [facility] by registering at the following website: [email or web address]. By joining [employer]’s FollowMyHealth portal, you will also be able to view your health information using other applications (apps) compatible with our system.

## 2020-06-12 NOTE — PROGRESS NOTE ADULT - ASSESSMENT
86y/o M with PMHx of Afib, Aortic valve stenosis, BPH, CAD, CHF, Cutaneous T-cell lymphoma, DM, ESRD on dialysis, Gout, HTN, LBBB, OAB, Squamous cell carcinoma of skin and TIA presented to the ED s/p fall. Pt was recently d/c from [facility] with discontinuation of  Eliquis and aspirin  due to GI bleed/Acute blood loss anemia / recurrent AVM bleed: S/P enteroscopy: jejunal bleeding AVM, cauterized on 5/26 . Pt was COVID + 2 weeks ago; repeat COVID testing positive.     - ESRD  - MWF schedule- HD today  - UF as tolerated.    Anemia of CKD with superimposed GIB  - h/h stable   -JARED with HD    CKD-MBD  Continue phos binders with meals  monitor Ca++ and phos    HTN  BP stable  Monitor BP

## 2020-06-12 NOTE — PROGRESS NOTE ADULT - SUBJECTIVE AND OBJECTIVE BOX
CC: Syncope/AFIBB        INTERVAL HPI/OVERNIGHT EVENTS: Patient seen and examined. No acute issues overnight. C/O burning on urination with hesitancy. Denies chest pain, SOB, dizziness, nausea, vomiting, fever, chills.     Vital Signs Last 24 Hrs  T(C): 36.5 (2020 09:19), Max: 36.7 (2020 16:00)  T(F): 97.7 (2020 09:19), Max: 98 (2020 16:00)  HR: 86 (:19) (72 - 90)  BP: 117/59 (2020 09:19) (117/59 - 147/77)  BP(mean): --  RR: 18 (:) (17 - 18)  SpO2: 100% (:19) (98% - 100%)    PHYSICAL EXAM:  General: Appears well developed, well nourished alert and cooperative.  HEENT: Ecchymosis left temporal area, brow bone  Eyes: Pupils reactive, cornea wnl.  Neck: Supple, no nodes adenopathy, no JVD  CARDIOVASCULAR: +s1/s2, irregular  LUNGS: No rales, rhonchi or wheeze. Normal breath sounds bilaterally.  ABDOMEN: Soft, nontender without mass or organomegaly. bowel sounds normoactive.  EXTREMITIES: No clubbing, cyanosis or edema. Distal pulses wnl.   SKIN: Skin tears/open areas right arm, right wrist, left hand  NEURO: Alert/oriented x 3/normal motor exam. No pathologic reflexes.    PSYCH: normal affect.      I&O's Detail    2020 07:01  -  2020 07:00  --------------------------------------------------------  IN:    Oral Fluid: 300 mL  Total IN: 300 mL    OUT:    Voided: 65 mL  Total OUT: 65 mL    Total NET: 235 mL                                    9.9    5.26  )-----------( 111      ( 2020 07:35 )             32.2     2020 07:35    132    |  96     |  29.0   ----------------------------<  95     4.2     |  26.0   |  5.11     Ca    8.7        2020 07:35  Phos  3.9       2020 07:35  Mg     2.2       2020 07:35        CAPILLARY BLOOD GLUCOSE      POCT Blood Glucose.: 90 mg/dL (2020 11:51)      Urinalysis Basic - ( 2020 12:36 )    Color: Yellow / Appearance: Clear / S.010 / pH: x  Gluc: x / Ketone: Negative  / Bili: Negative / Urobili: Negative mg/dL   Blood: x / Protein: 100 mg/dL / Nitrite: Negative   Leuk Esterase: Negative / RBC: Negative /HPF / WBC 3-5   Sq Epi: x / Non Sq Epi: Occasional / Bacteria: Negative        MEDICATIONS  (STANDING):  chlorhexidine 2% Cloths 1 Application(s) Topical once  cholecalciferol 1000 Unit(s) Oral daily  epoetin shakila-epbx (RETACRIT) Injectable 33356 Unit(s) IV Push <User Schedule>  famotidine  Oral Tab/Cap - Peds 20 milliGRAM(s) Oral at bedtime  fenofibrate Tablet 145 milliGRAM(s) Oral daily  levothyroxine 75 MICROGram(s) Oral daily  melatonin 5 milliGRAM(s) Oral at bedtime  metoprolol tartrate 25 milliGRAM(s) Oral two times a day  pantoprazole   Suspension 40 milliGRAM(s) Oral daily  phenazopyridine 100 milliGRAM(s) Oral every 8 hours  sevelamer carbonate 800 milliGRAM(s) Oral three times a day  tamsulosin 0.4 milliGRAM(s) Oral at bedtime    MEDICATIONS  (PRN):  acetaminophen   Tablet .. 650 milliGRAM(s) Oral once PRN Moderate Pain (4 - 6)  polyethylene glycol 3350 17 Gram(s) Oral daily PRN Constipation      RADIOLOGY & ADDITIONAL TESTS:   EXAM:  CT CHEST IC                          PROCEDURE DATE:  2020          INTERPRETATION:  EXAM:CT CHEST WITH IV CONTRAST.     CLINICAL INDICATION: r/o svc thrombus .     TECHNIQUE: CT imaging was performed following the administration of intravenous contrast. Early and mid venous phase acquisitions were obtained. Axial, coronal and MIP scans are reviewed. 92 mL of Omnipaque 300 was utilized for contrast enhancement and 8 mL was discarded.     PRIOR EXAM: 2020 at 3:21 PM.     FINDINGS:       CERVICOTHORACIC JUNCTION AND AXILLARY REGIONS: Within normal limits.    MEDIASTINUM AND SADAF: Suspect retained secretions adherent to the left tracheal wall. Evidence of bronchial plugging in the lower lobes.     Low densities adjacent to the central line in the SVC demonstrate an average density of 116 Hounsfield units. There is also change in appearance between the 2 acquisitions. In view of this, the appearance is probably related to artifact rather than a thrombus.    Mild right infrahilar and subcarinal lymphadenopathy. Coronary artery calcification. Evidence of a TAVR.    LUNG PARENCHYMA: Dependent lung subsegmental atelectasis/infiltrates involving the lower lobes bilaterally. Scattered patchy groundglass and reticular opacities involving the peripheral lungs bilaterally that could be related to an inflammatory process.    PLEURA: Trace bilateral pleural effusions.    UPPER ABDOMEN: Cholelithiasis. Diminished cortical thickness involving the kidneys bilaterally and retained contrast within the collecting systems from presumably prior IV contrast administration, possibly related to diminished renal function. Possibility of small renal cysts that cannot be definitely characterized.    THORACIC WALL AND OSSEOUS STRUCTURES: Within normal limits.      IMPRESSION:     Low densities adjacent to the central line in the SVC probably represent artifact as described above.    Additional findings as above.      Nurse Rabago was informed with read back verification.                  SAVANNAH DUENAS M.D., ATTENDING RADIOLOGIST  This document has been electronically signed. 2020  9:22AM CC: Syncope/AFIBB        INTERVAL HPI/OVERNIGHT EVENTS: Patient seen and examined. No acute issues overnight. C/O burning on urination with hesitancy. Denies chest pain, SOB, dizziness, nausea, vomiting, fever, chills.     Vital Signs Last 24 Hrs  T(C): 36.5 (2020 09:19), Max: 36.7 (2020 16:00)  T(F): 97.7 (2020 09:19), Max: 98 (2020 16:00)  HR: 86 (:19) (72 - 90)  BP: 117/59 (2020 09:19) (117/59 - 147/77)  BP(mean): --  RR: 18 (:) (17 - 18)  SpO2: 100% (:19) (98% - 100%)    PHYSICAL EXAM:  General: Appears well developed, well nourished alert and cooperative.  HEENT: Ecchymosis left temporal area, brow bone  Eyes: Pupils reactive, cornea wnl.  Neck: Supple, no nodes adenopathy, no JVD  CARDIOVASCULAR: +s1/s2, irregular  LUNGS: No rales, rhonchi or wheeze. Normal breath sounds bilaterally.  ABDOMEN: Soft, nontender without mass or organomegaly. bowel sounds normoactive.  EXTREMITIES: No clubbing, cyanosis or edema. Distal pulses wnl.   SKIN: Skin tears/open areas right arm, right wrist, left hand  NEURO: Alert/oriented x 3/normal motor exam. No pathologic reflexes.    PSYCH: normal affect.      I&O's Detail    2020 07:01  -  2020 07:00  --------------------------------------------------------  IN:    Oral Fluid: 300 mL  Total IN: 300 mL    OUT:    Voided: 65 mL  Total OUT: 65 mL    Total NET: 235 mL                                    9.9    5.26  )-----------( 111      ( 2020 07:35 )             32.2     2020 07:35    132    |  96     |  29.0   ----------------------------<  95     4.2     |  26.0   |  5.11     Ca    8.7        2020 07:35  Phos  3.9       2020 07:35  Mg     2.2       2020 07:35        CAPILLARY BLOOD GLUCOSE      POCT Blood Glucose.: 90 mg/dL (2020 11:51)      Urinalysis Basic - ( 2020 12:36 )    Color: Yellow / Appearance: Clear / S.010 / pH: x  Gluc: x / Ketone: Negative  / Bili: Negative / Urobili: Negative mg/dL   Blood: x / Protein: 100 mg/dL / Nitrite: Negative   Leuk Esterase: Negative / RBC: Negative /HPF / WBC 3-5   Sq Epi: x / Non Sq Epi: Occasional / Bacteria: Negative        MEDICATIONS  (STANDING):  chlorhexidine 2% Cloths 1 Application(s) Topical once  cholecalciferol 1000 Unit(s) Oral daily  epoetin shakila-epbx (RETACRIT) Injectable 56612 Unit(s) IV Push <User Schedule>  famotidine  Oral Tab/Cap - Peds 20 milliGRAM(s) Oral at bedtime  fenofibrate Tablet 145 milliGRAM(s) Oral daily  levothyroxine 75 MICROGram(s) Oral daily  melatonin 5 milliGRAM(s) Oral at bedtime  metoprolol tartrate 25 milliGRAM(s) Oral two times a day  pantoprazole   Suspension 40 milliGRAM(s) Oral daily  phenazopyridine 100 milliGRAM(s) Oral every 8 hours  sevelamer carbonate 800 milliGRAM(s) Oral three times a day  tamsulosin 0.4 milliGRAM(s) Oral at bedtime    MEDICATIONS  (PRN):  acetaminophen   Tablet .. 650 milliGRAM(s) Oral once PRN Moderate Pain (4 - 6)  polyethylene glycol 3350 17 Gram(s) Oral daily PRN Constipation      RADIOLOGY & ADDITIONAL TESTS:   EXAM:  CT CHEST IC                        PROCEDURE DATE:  2020    INTERPRETATION:  EXAM:CT CHEST WITH IV CONTRAST.   CLINICAL INDICATION: r/o svc thrombus .   TECHNIQUE: CT imaging was performed following the administration of intravenous contrast. Early and mid venous phase acquisitions were obtained. Axial, coronal and MIP scans are reviewed. 92 mL of Omnipaque 300 was utilized for contrast enhancement and 8 mL was discarded.   PRIOR EXAM: 2020 at 3:21 PM.   FINDINGS:   CERVICOTHORACIC JUNCTION AND AXILLARY REGIONS: Within normal limits.  MEDIASTINUM AND SADAF: Suspect retained secretions adherent to the left tracheal wall. Evidence of bronchial plugging in the lower lobes.   Low densities adjacent to the central line in the SVC demonstrate an average density of 116 Hounsfield units. There is also change in appearance between the 2 acquisitions. In view of this, the appearance is probably related to artifact rather than a thrombus.  Mild right infrahilar and subcarinal lymphadenopathy. Coronary artery calcification. Evidence of a TAVR.  LUNG PARENCHYMA: Dependent lung subsegmental atelectasis/infiltrates involving the lower lobes bilaterally. Scattered patchy groundglass and reticular opacities involving the peripheral lungs bilaterally that could be related to an inflammatory process.  PLEURA: Trace bilateral pleural effusions.  UPPER ABDOMEN: Cholelithiasis. Diminished cortical thickness involving the kidneys bilaterally and retained contrast within the collecting systems from presumably prior IV contrast administration, possibly related to diminished renal function. Possibility of small renal cysts that cannot be definitely characterized.  THORACIC WALL AND OSSEOUS STRUCTURES: Within normal limits.  IMPRESSION:   Low densities adjacent to the central line in the SVC probably represent artifact as described above.  Additional findings as above.  Nurse Rabago was informed with read back verification.  SAVANNAH DUENAS M.D., ATTENDING RADIOLOGIST  This document has been electronically signed. 2020  9:22AM      < from: Xray Elbow AP + Lateral + Oblique, Right (20 @ 18:41) >  IMPRESSION:   No acute radiographic osseous pathology..  < end of copied text >

## 2020-06-12 NOTE — OCCUPATIONAL THERAPY INITIAL EVALUATION ADULT - PERTINENT HX OF CURRENT PROBLEM, REHAB EVAL
Pt recently d/c from [facility] with discontinuation of  Eliquis and aspirin  due to GI bleed/Acute blood loss anemia / recurrent AVM bleed: S/P enteroscopy: jejunal bleeding AVM, cauterized on 5/26 .

## 2020-06-12 NOTE — PROGRESS NOTE ADULT - SUBJECTIVE AND OBJECTIVE BOX
[facility]                                        Neurology at [place]                                 David Tavarez, & Osvaldo                                  [address]                                        [place], [place], [address]                                             [phone]        CC: Syncope    HPI:   The patient is a 85y Male who presented with fall.  He says he slipped without LOC, per chart his daughter witnessed two brief episodes of unresponsiveness (5 and 10 seconds) with m,ild confusion after falling.  Currently he denies any neurological symptoms.    Interim history:  On 3 [place].   No new neurologic complaints.     ROS:   Denies headache or dizziness.  Denies chest pain.  Denies shortness of breath.    MEDICATIONS  (STANDING):  chlorhexidine 2% Cloths 1 Application(s) Topical once  cholecalciferol 1000 Unit(s) Oral daily  epoetin shakila-epbx (RETACRIT) Injectable 00680 Unit(s) IV Push <User Schedule>  famotidine  Oral Tab/Cap - Peds 20 milliGRAM(s) Oral at bedtime  fenofibrate Tablet 145 milliGRAM(s) Oral daily  levothyroxine 75 MICROGram(s) Oral daily  melatonin 5 milliGRAM(s) Oral at bedtime  metoprolol tartrate 25 milliGRAM(s) Oral two times a day  pantoprazole   Suspension 40 milliGRAM(s) Oral daily  phenazopyridine 100 milliGRAM(s) Oral every 8 hours  sevelamer carbonate 800 milliGRAM(s) Oral three times a day  tamsulosin 0.4 milliGRAM(s) Oral at bedtime      Vital Signs Last 24 Hrs  T(C): 36.5 (12 Jun 2020 09:19), Max: 36.7 (11 Jun 2020 16:00)  T(F): 97.7 (12 Jun 2020 09:19), Max: 98 (11 Jun 2020 16:00)  HR: 86 (12 Jun 2020 09:19) (72 - 90)  BP: 117/59 (12 Jun 2020 09:19) (117/59 - 147/77)  RR: 18 (12 Jun 2020 09:19) (17 - 18)  SpO2: 100% (12 Jun 2020 09:19) (98% - 100%)    Detailed Neurologic Exam:    Mental status: The patient is awake and alert. There is no aphasia. There is no dysarthria.     Cranial nerves: Pupils equal and react symmetrically to light. There is no visual field deficit to threat. Extraocular motion is full with no nystagmus. There is no ptosis. Facial sensation is intact. Facial musculature is symmetric. Palate elevates symmetrically. Tongue is midline.    Motor: There is normal bulk and tone.  There is no tremor.  Strength grossly 5/5 bilaterally.    Sensation: Grossly intact to light touch and pin.    Reflexes: 1+ throughout and plantar responses are flexor.    Cerebellar: No dysmetria on finger nose testing.    Labs:     06-12    132<L>  |  96<L>  |  29.0<H>  ----------------------------<  95  4.2   |  26.0  |  5.11<H>    Ca    8.7      12 Jun 2020 07:35  Phos  3.9     06-12  Mg     2.2     06-12                              9.9    5.26  )-----------( 111      ( 12 Jun 2020 07:35 )             32.2

## 2020-06-13 ENCOUNTER — TRANSCRIPTION ENCOUNTER (OUTPATIENT)
Age: 85
End: 2020-06-13

## 2020-06-13 VITALS
RESPIRATION RATE: 20 BRPM | DIASTOLIC BLOOD PRESSURE: 61 MMHG | TEMPERATURE: 98 F | SYSTOLIC BLOOD PRESSURE: 112 MMHG | HEART RATE: 105 BPM | OXYGEN SATURATION: 99 %

## 2020-06-13 LAB
HAV IGM SER-ACNC: SIGNIFICANT CHANGE UP
HBV CORE IGM SER-ACNC: SIGNIFICANT CHANGE UP
HBV SURFACE AG SER-ACNC: SIGNIFICANT CHANGE UP
HCV AB S/CO SERPL IA: 0.2 S/CO — SIGNIFICANT CHANGE UP (ref 0–0.99)
HCV AB SERPL-IMP: SIGNIFICANT CHANGE UP

## 2020-06-13 PROCEDURE — 80074 ACUTE HEPATITIS PANEL: CPT

## 2020-06-13 PROCEDURE — 71275 CT ANGIOGRAPHY CHEST: CPT

## 2020-06-13 PROCEDURE — 99285 EMERGENCY DEPT VISIT HI MDM: CPT | Mod: 25

## 2020-06-13 PROCEDURE — 83735 ASSAY OF MAGNESIUM: CPT

## 2020-06-13 PROCEDURE — 71045 X-RAY EXAM CHEST 1 VIEW: CPT

## 2020-06-13 PROCEDURE — 97116 GAIT TRAINING THERAPY: CPT

## 2020-06-13 PROCEDURE — 99261: CPT

## 2020-06-13 PROCEDURE — 95707 EEG W/O VID 2-12HR CONT MNTR: CPT

## 2020-06-13 PROCEDURE — 86901 BLOOD TYPING SEROLOGIC RH(D): CPT

## 2020-06-13 PROCEDURE — 83880 ASSAY OF NATRIURETIC PEPTIDE: CPT

## 2020-06-13 PROCEDURE — 81001 URINALYSIS AUTO W/SCOPE: CPT

## 2020-06-13 PROCEDURE — 86769 SARS-COV-2 COVID-19 ANTIBODY: CPT

## 2020-06-13 PROCEDURE — 85027 COMPLETE CBC AUTOMATED: CPT

## 2020-06-13 PROCEDURE — 36415 COLL VENOUS BLD VENIPUNCTURE: CPT

## 2020-06-13 PROCEDURE — 86900 BLOOD TYPING SEROLOGIC ABO: CPT

## 2020-06-13 PROCEDURE — 84484 ASSAY OF TROPONIN QUANT: CPT

## 2020-06-13 PROCEDURE — 90715 TDAP VACCINE 7 YRS/> IM: CPT

## 2020-06-13 PROCEDURE — 97167 OT EVAL HIGH COMPLEX 60 MIN: CPT

## 2020-06-13 PROCEDURE — 90471 IMMUNIZATION ADMIN: CPT

## 2020-06-13 PROCEDURE — 87640 STAPH A DNA AMP PROBE: CPT

## 2020-06-13 PROCEDURE — T1013: CPT

## 2020-06-13 PROCEDURE — 87641 MR-STAPH DNA AMP PROBE: CPT

## 2020-06-13 PROCEDURE — 87086 URINE CULTURE/COLONY COUNT: CPT

## 2020-06-13 PROCEDURE — U0003: CPT

## 2020-06-13 PROCEDURE — 82962 GLUCOSE BLOOD TEST: CPT

## 2020-06-13 PROCEDURE — 99239 HOSP IP/OBS DSCHRG MGMT >30: CPT

## 2020-06-13 PROCEDURE — 99233 SBSQ HOSP IP/OBS HIGH 50: CPT

## 2020-06-13 PROCEDURE — 97530 THERAPEUTIC ACTIVITIES: CPT

## 2020-06-13 PROCEDURE — 84100 ASSAY OF PHOSPHORUS: CPT

## 2020-06-13 PROCEDURE — 95819 EEG AWAKE AND ASLEEP: CPT

## 2020-06-13 PROCEDURE — G0378: CPT

## 2020-06-13 PROCEDURE — 70450 CT HEAD/BRAIN W/O DYE: CPT

## 2020-06-13 PROCEDURE — 84443 ASSAY THYROID STIM HORMONE: CPT

## 2020-06-13 PROCEDURE — 86850 RBC ANTIBODY SCREEN: CPT

## 2020-06-13 PROCEDURE — 71260 CT THORAX DX C+: CPT

## 2020-06-13 PROCEDURE — 93005 ELECTROCARDIOGRAM TRACING: CPT

## 2020-06-13 PROCEDURE — 83690 ASSAY OF LIPASE: CPT

## 2020-06-13 PROCEDURE — 80053 COMPREHEN METABOLIC PANEL: CPT

## 2020-06-13 PROCEDURE — 80048 BASIC METABOLIC PNL TOTAL CA: CPT

## 2020-06-13 PROCEDURE — 73080 X-RAY EXAM OF ELBOW: CPT

## 2020-06-13 RX ORDER — METOPROLOL TARTRATE 50 MG
1 TABLET ORAL
Qty: 0 | Refills: 0 | DISCHARGE
Start: 2020-06-13

## 2020-06-13 RX ORDER — METOPROLOL TARTRATE 50 MG
1 TABLET ORAL
Qty: 0 | Refills: 0 | DISCHARGE

## 2020-06-13 RX ORDER — DOCUSATE SODIUM 100 MG
2 CAPSULE ORAL
Qty: 0 | Refills: 0 | DISCHARGE

## 2020-06-13 RX ADMIN — Medication 25 MILLIGRAM(S): at 05:37

## 2020-06-13 RX ADMIN — Medication 1000 UNIT(S): at 11:46

## 2020-06-13 RX ADMIN — PANTOPRAZOLE SODIUM 40 MILLIGRAM(S): 20 TABLET, DELAYED RELEASE ORAL at 11:46

## 2020-06-13 RX ADMIN — Medication 100 MILLIGRAM(S): at 05:37

## 2020-06-13 RX ADMIN — SEVELAMER CARBONATE 800 MILLIGRAM(S): 2400 POWDER, FOR SUSPENSION ORAL at 11:47

## 2020-06-13 RX ADMIN — Medication 75 MICROGRAM(S): at 05:37

## 2020-06-13 RX ADMIN — Medication 100 MILLIGRAM(S): at 16:28

## 2020-06-13 RX ADMIN — Medication 145 MILLIGRAM(S): at 11:46

## 2020-06-13 RX ADMIN — SEVELAMER CARBONATE 800 MILLIGRAM(S): 2400 POWDER, FOR SUSPENSION ORAL at 05:37

## 2020-06-13 NOTE — DISCHARGE NOTE PROVIDER - HOSPITAL COURSE
85 year old male with PMH HTN, AF not on AC due to recent GIB, CAD s/p remote PCI, CHF, ESRD on HF M,W,F, Severe AS s/p TAVR, Hypothyroidism presented after syncopal episode at home. Initial CTH negative. AF seen on telemetry, evaluated by EP with recommendations for outpatient MCOT. Troponin negative x 2. He did have a CTA chest with questionable RIJ thrombus, opined to be artefact on repeat CT. He was also seen in consultation by Neurology, EEG reported at having no epileptiform activity.     He was continued on HD as per schedule and was seen in consultation by PT and OT with recommendations for home.    Notably his anemia has been stable and he was also noted to have nmilf hyponatremia related for ESRD as well as mild thrombocytopenia without any bleeding. TSH slightly elevated at 4.8, repeat testing recommended in 6 weeks.    He is known to COVID PCR+, which tested positive on repeat testing with IgM.        Patient is medically stable for discharge. 35 minutes.

## 2020-06-13 NOTE — DISCHARGE NOTE PROVIDER - NSDCMRMEDTOKEN_GEN_ALL_CORE_FT
cholecalciferol oral tablet: 1000 unit(s) orally once a day  famotidine 20 mg oral tablet: 1 tab(s) orally once a day (at bedtime)  levothyroxine 75 mcg (0.075 mg) oral tablet: 1 tab(s) orally once a day  Melatonin 5 mg oral tablet: 1 tab(s) orally once a day (at bedtime)  metoprolol tartrate 25 mg oral tablet: 1 tab(s) orally 2 times a day  pantoprazole 40 mg oral delayed release tablet: 1 tab(s) orally 2 times a day   sevelamer hydrochloride 800 mg oral tablet: 2 tab(s) orally 3 times a day (with meals)  tamsulosin 0.4 mg oral capsule: 1 cap(s) orally once a day  TriCor 145 mg oral tablet: 1 tab(s) orally once a day

## 2020-06-13 NOTE — PROGRESS NOTE ADULT - ASSESSMENT
86y/o M with PMHx of Afib, Aortic valve stenosis, BPH, CAD, CHF, Cutaneous T-cell lymphoma, DM, ESRD on dialysis, Gout, HTN, LBBB, OAB, Squamous cell carcinoma of skin and TIA presented to the ED s/p falling forward and hitting right side of head after standing up from recline. According to EMS, fall was witnessed who family who was watching TV with pt when he fell, they tried calling his name and he was unresponsive for ~10 seconds. Pt  recently d/c from [facility] with discontinuation of  Eliquis and aspirin  due to GI bleed/Acute blood loss anemia / recurrent AVM bleed: S/P enteroscopy: jejunal bleeding AVM, cauterized on 5/26 . Pt was COVID + 2 weeks ago.    1) Syncope  - CT brain negative  - Cardiology input appreciated  - continue CM  - EP recommend  outpt Cardionet 30 day MCOT to r/o occult nicole  - CTA negative PE  -  UA negative  -  EEG brain without any epileptiform activity    2) ESRD on HD  - renal following - normal schedule is M-W-F    3) Afib  - off AC due to GI bleed  - continue metoprolol, rate controlled     4) HLD/CAD/CHF  - on fenofibrate, metoprolol   - cardio following    5) Hypothyroidism   - on synthroid    6) DVT ppx  -recent GI bleed with asa/Eliquis on hold, ICD's ordered    7)Anemia- H&H stable  -recent GI bleed, now off AC/asa- monitor H&H  -protonix    8)Fall/right arm pain  - xray- no fracture  -local wound care    9)Dysuria  -UA negative  -check bladder scan post void  -Pyridium x 3 days for probable cystitis     Dispo: 86y/o M with PMHx of Afib, Aortic valve stenosis, BPH, CAD, CHF, Cutaneous T-cell lymphoma, DM, ESRD on dialysis, Gout, HTN, LBBB, OAB, Squamous cell carcinoma of skin and TIA presented to the ED s/p falling forward and hitting right side of head after standing up from recline. According to EMS, fall was witnessed who family who was watching TV with pt when he fell, they tried calling his name and he was unresponsive for ~10 seconds. Pt  recently d/c from [facility] with discontinuation of  Eliquis and aspirin  due to GI bleed/Acute blood loss anemia / recurrent AVM bleed: S/P enteroscopy: jejunal bleeding AVM, cauterized on 5/26 . Pt was COVID + 2 weeks ago.    1) Syncope - No recurrent episodes. Work up negative  - CT brain negative  - continue CM  - EP recommend  outpt Cardionet 30 day MCOT to r/o occult nicole  - CTA negative PE  -  UA negative  -  EEG brain without any epileptiform activity    2) ESRD on HD  - renal following - normal schedule is M-W-F    3) Afib - rate controlled.  - off AC due to GI bleed  - continue metoprolol, rate controlled     4) HLD/CAD/CHF  - on fenofibrate, metoprolol   - cardio following    5) Hypothyroidism   - on synthroid    6) DVT ppx  - recent GI bleed with asa/Eliquis on hold, ICD's ordered    7)Anemia- H&H stable  -recent GI bleed, now off AC/asa- monitor H&H  - continue Protonix    8)Fall/right arm pain  - xray- no fracture  -local wound care    9)Dysuria  -UA negative  -check bladder scan post void  -Pyridium x 3 days for probable cystitis     Dispo:  Discharge home

## 2020-06-13 NOTE — DISCHARGE NOTE PROVIDER - CARE PROVIDER_API CALL
Tadeo Wilkins  INTERNAL MEDICINE  [address] [place] [address]  Phone: [phone]  Fax: [phone]  Follow Up Time:

## 2020-06-13 NOTE — DISCHARGE NOTE PROVIDER - NSDCACTIVITY_GEN_ALL_CORE
Do not drive or operate machinery/Stairs allowed/Walking - Outdoors allowed/Showering allowed/Do not make important decisions/Walking - Indoors allowed/No heavy lifting/straining/Bathing allowed

## 2020-06-13 NOTE — PROGRESS NOTE ADULT - ASSESSMENT
84y/o M with PMHx of Afib, Aortic valve stenosis, BPH, CAD, CHF, Cutaneous T-cell lymphoma, DM, ESRD on dialysis, Gout, HTN, LBBB, OAB, Squamous cell carcinoma of skin and TIA presented to the ED s/p fall. Pt was recently d/c from [facility] with discontinuation of  Eliquis and aspirin  due to GI bleed/Acute blood loss anemia / recurrent AVM bleed: S/P enteroscopy: jejunal bleeding AVM, cauterized on 5/26 . Pt was COVID + 2 weeks ago; repeat COVID testing positive.      ESRD on HD  - MWF schedule    Anemia of CKD with superimposed GIB  - H/H stable   -JARED with HD    CKD-MBD  Continue phos binders with meals  monitor Ca++ and phos    HTN  BP stable  Monitor BP

## 2020-06-13 NOTE — PROGRESS NOTE ADULT - SUBJECTIVE AND OBJECTIVE BOX
HOSPITALIST PROGRESS NOTE    TAMY WHITE  [id number]  85yMale    Patient is a 85y old  Male who presents with a chief complaint of Syncope (2020 09:18)      SUBJECTIVE:   Chart reviewed since last visit.  Patient seen and examined at bedside.      OBJECTIVE:  Vital Signs Last 24 Hrs  T(C): 36.4 (2020 07:30), Max: 36.4 (2020 17:05)  T(F): 97.6 (2020 07:30), Max: 97.6 (2020 07:30)  HR: 90 (2020 07:30) (90 - 102)  BP: 107/70 (2020 07:30) (107/70 - 143/77)  BP(mean): --  RR: 16 (2020 07:30) (16 - 18)  SpO2: 100% (2020 07:30) (98% - 100%)    PHYSICAL EXAMINATION  General:   HEENT:    NECK:    CVS:   RESP:    GI:    :   MSK:    CNS:    INTEG:    PSYCH:      MONITOR:  CAPILLARY BLOOD GLUCOSE            I&O's Summary    2020 07:01  -  2020 07:00  --------------------------------------------------------  IN: 700 mL / OUT: 2000 mL / NET: -1300 mL                            9.9    5.26  )-----------( 111      ( 2020 07:35 )             32.2       06-12    132<L>  |  96<L>  |  29.0<H>  ----------------------------<  95  4.2   |  26.0  |  5.11<H>    Ca    8.7      2020 07:35  Phos  3.9     06-12  Mg     2.2     06-12    Thyroid Stimulating Hormone, Serum: 4.80 uIU/mL (20 @ 06:00)          Urinalysis Basic - ( 2020 12:36 )    Color: Yellow / Appearance: Clear / S.010 / pH: x  Gluc: x / Ketone: Negative  / Bili: Negative / Urobili: Negative mg/dL   Blood: x / Protein: 100 mg/dL / Nitrite: Negative   Leuk Esterase: Negative / RBC: Negative /HPF / WBC 3-5   Sq Epi: x / Non Sq Epi: Occasional / Bacteria: Negative      COVID-19 PCR: Detected: This test has been validated by [employer] to be accurate;  though it has not been FDA cleared/approved by the usual pathway.  As with all laboratory tests, results should be correlated with clinical  findings.  https://www.fda.gov/media/111290/download  https://www.fda.gov/media/540820/download (06.10.20 @ 00:33)    COVID-19  Antibody - for prior infection screening (20 @ 15:37)    COVID-19 IgG Antibody Index: 2.39: Abbott CMIA  Negative Result    <= 1.39 Index  Positive Result      >= 1.40 Index Index    COVID-19 IgG Antibody Interpretation: Positive: This test has not been reviewed by the FDA by the standard review  process. It has been authorized for emergency use by the FDA. [employer] has validated this test to be accurate.  Negative results do not rule out SARS-CoV-2 infection, particularly in  those who have been in recent contact with the virus. Follow-up testing  with a molecular diagnostic test should be considered to rule out  infection in these individuals.  Results from antibody testing should not be used as thesole basis to  diagnose or exclude SARS-CoV-2 infection, or to inform infection status.  Positive results may rarely be due to past or present infection with  non-SARS-CoV-2 coronavirus strains, such as coronavirus HKU1, NL63, OC43,  or 229E. [facility], through extensive validation  testing, has confirmed that this risk is minimal with this test.      Culture:  Culture - Urine (20 @ 21:21)    Specimen Source: .Urine Clean Catch (Midstream)    Culture Results:   10,000 - 49,000 CFU/mL Alpha hemolytic strep "Susceptibilities not  performed"  <10,000 CFU/ml of other organisms      TTE:    < from: TTE Echo Complete w/o contrast w/ Doppler (20 @ 14:19) >     EXAM:  ECHO TTE WO CON COMP W DOPP         PROCEDURE DATE:  2020        INTERPRETATION:  Transthoracic Echocardiography Report (TTE)     Demographics     Patient name       CINDY MORELOS   Age           85 year(s)                      KEARA     Med Rec #          [id number]           Gender        Male     Account #          [id number]        Date of Birth 1935     Interpreting       Dayan Jenkins MD    Room Number   0293   Physician     Referring          Dayan Jenkins MDSonographer   Les Cutler RDCS   Physician     Date of study      2020 08:08 AM     Height             68.9 in             Weight        169.98 pounds    Type of Study:     TTE procedure: ECHO TTE WO CON COMP W DOP     BP: 110/49 mmHg     Study Location: 2SWTechnical Quality: Fair    Indications   1) R94.31 - Abnormal electrocardiogram ECG EKG    M-Mode Measurements (cm)     LVEDd: 4.22 cm            LVESd: 3 cm   IVSEd: 1.4 cm   LVPWd: 1.13 cm            AO Root Dimension: 2.7 cm                         LA: 4.9 cm    Doppler Measurements:     AV Velocity:204 cm/s                MV Peak E-Wave: 133 cm/s   AV Peak Gradient: 16.65 mmHg   AV Mean Gradient: 9 mmHg            MV Peak Gradient: 7.08 mmHg   TR Velocity:308 cm/s   TR Gradient:37.9456 mmHg   Estimated RAP:10 mmHg   RVSP:48 mmHg     Findings     Mitral Valve   Mild mitral annular calcification is present.   Fibrocalcific changes noted to the mitral valve leaflets with preserved   leaflet excursion.   Moderate (2+) mitral regurgitation is present.     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 Peak   trans-prosthetic gradient is within normal limitations for this type of   prosthesis.     Tricuspid Valve   The tricuspid valve leaflets are thin and pliable; valve motion is normal.   Moderate (2+) tricuspid valve regurgitation is present.   Mild to moderate pulmonary hypertension.     Pulmonic Valve   Mild pulmonic valvular regurgitation (1+) is present.     Left Atrium   The leftatrium is moderately dilated.     Left Ventricle   Mild concentric left ventricular hypertrophy is present.   Estimated left ventricular ejection fraction is 60 %.     Right Atrium   The right atrium appears moderately dilated.     Right Ventricle   The right ventricle is at the upper limits of normal in size.     Pericardial Effusion   No evidence of pericardial effusion.     Pleural Effusion   No evidence of pleural effusion.     Miscellaneous   The IVC is dilated with decreased respiratory variation     Impression     Summary     Mild concentric left ventricular hypertrophy is present.   Estimated left ventricular ejection fraction is 60 %.   The left atrium is moderately dilated.   The right atrium appears moderately dilated.   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 Peak   trans-prosthetic gradient is within normal limitations for this type of   prosthesis.   Mild mitral annular calcification is present.   Fibrocalcific changes noted to the mitral valve leaflets with preserved   leaflet excursion.   Moderate (2+) mitral regurgitation is present.   The tricuspid valve leaflets are thin and pliable; valve motion is normal.   Moderate (2+) tricuspid valve regurgitation is present.   Mild to moderate pulmonary hypertension.   Mild pulmonic valvular regurgitation (1+) is present.   The IVC is dilated with decreased respiratory variation     Signature     ----------------------------------------------------------------   Electronically signed by Dayan Jenkins MD(Interpreting   physician) on 2020 04:22 PM   ----------------------------------------------------------------    Valves     Mitral Valve     Peak E-Wave: 133 cm/s   Peak Gradient: 7.08 mmHg     Aortic Valve     Peak Velocity: 204 cm/s       Mean Gradient: 9 mmHg   Peak Gradient: 16.65 mmHg                AV VTI: 41.8 cm     Tricuspid Valve     TR Velocity: 308 cm/s                  Estimated RAP: 10 mmHg   TR Gradient: 37.9456 mmHg              Estimated RVSP: 48 mmHg     Pulmonic Valve              Estimated PASP: 47.95 mmHg     LVOT     Peak Velocity: 79.5 cm/s   Peak Gradient: 3 mmHg    Structures     Left Atrium     LA Dimension: 4.9 cm          LA Area: 29.6 cm^2   LA/Aorta: 1.81                LA Volume/Index: 89 ml /46m^2     Left Ventricle     Diastolic Dimension: 4.22 cm            Systolic Dimension: 3 cm   Septum Diastolic: 1.4 cm   PW Diastolic: 1.13 cm     FS: 28.91 %     Right Atrium     RA Systolic Pressure: 10 mmHg     Right Ventricle              RV Systolic Pressure: 47.95 mmHg     Miscellaneous     Aorta     Aortic Root: 2.7 cm                    DAYAN JENKINS M.D., ATTENDING CARDIOLOGIST  This document has been electronically signed. May 11 2020  4:22PM        < end of copied text >  RADIOLOGY  < from: CT Chest w/ IV Cont (20 @ 21:44) >     EXAM:  CT CHEST IC                          PROCEDURE DATE:  2020          INTERPRETATION:  EXAM:CT CHEST WITH IV CONTRAST.     CLINICAL INDICATION: r/o svc thrombus .     TECHNIQUE: CT imaging was performed following the administration of intravenous contrast. Early and mid venous phase acquisitions were obtained. Axial, coronal and MIP scans are reviewed. 92 mL of Omnipaque 300 was utilized for contrast enhancement and 8 mL was discarded.     PRIOR EXAM: 2020 at 3:21 PM.     FINDINGS:       CERVICOTHORACIC JUNCTION AND AXILLARY REGIONS: Within normal limits.    MEDIASTINUM AND SADAF: Suspect retained secretions adherent to the left tracheal wall. Evidence of bronchial plugging in the lower lobes.     Low densities adjacent to the central line in the SVC demonstrate an average density of 116 Hounsfield units. There is also change in appearance between the 2 acquisitions. In view of this, the appearance is probably related to artifact rather than a thrombus.    Mild right infrahilar and subcarinal lymphadenopathy. Coronary artery calcification. Evidence of a TAVR.    LUNG PARENCHYMA: Dependent lung subsegmental atelectasis/infiltrates involving the lower lobes bilaterally. Scattered patchy groundglass and reticular opacities involving the peripheral lungs bilaterally that could be related to an inflammatory process.    PLEURA: Trace bilateral pleural effusions.    UPPER ABDOMEN: Cholelithiasis. Diminished cortical thickness involving the kidneys bilaterally and retained contrast within the collecting systems from presumably prior IV contrast administration, possibly related to diminished renal function. Possibility of small renal cysts that cannot be definitely characterized.    THORACIC WALL AND OSSEOUS STRUCTURES: Within normal limits.      IMPRESSION:     Low densities adjacent to the central line in the SVC probably represent artifact as described above.    Additional findings as above.      Nurse Rabago was informed with read back verification.                  SAVANNAH DUENAS M.D., ATTENDING RADIOLOGIST  This document has been electronically signed. 2020  9:22AM        < end of copied text >      < from: CT Head No Cont (20 @ 20:14) >     EXAM:  CT BRAIN                          PROCEDURE DATE:  2020          INTERPRETATION:  HISTORY: Injury    Evaluation demonstrates no evidence of mass-effect or midline shift. No intraparenchymal mass lesions or hemorrhage is identified. There is no evidence of hydrocephalus. No extra-axial collections are noted.    The bone windows demonstrate no gross osseous abnormalities.    Impression:  1. Unremarkable noncontrast CT scan of the brain.                      LELA MARTINS M.D. ATTENDING RADIOLOGIST  This document has been electronically signed. 2020  8:24PM    < end of copied text >    < from: EEG Awake and Asleep (20 @ 14:33) >    EXAM:  EEG-AWAKE AND ASLEEP      PROCEDURE DATE:  2020   .      INTERPRETATION:  Technique: This 18-channel EEG with 1 channel devoted to EKG is performed with the patient in the awake and drowsy state. Electrodes were placed according to the standard 10-20 system.    Background: The wakeful background was well-organized with a posterior dominant rhythm of 6-7 Hz. There is low voltage fast activity anteriorly. The remainder of the background consisted of a mixture of theta frequencies with beta activity superimposed.    Drowsiness was characterized by diffuse slowing of the record and the appearance of symmetric vertex waves.     Stage II sleep was not attained.     Stimulation: Photic stimulation produced no change in the background.    Specific features: No focal, lateralizing, or epileptiform activity was present.    Impression: This is an abnormal record characterized by generalized background slowing. It is indicative of diffuse cerebral dysfunction.          IMPRESSION:      Read By: DIMAS CARMONA M.D., NEUROLOGY ATTENDING  2020  2:36PM.                  Signed By: DIMAS CARMONA M.D., NEUROLOGY ATTENDING  2020  2:37PM  This report has been electronically signed.    < end of copied text >      MEDICATIONS  (STANDING):  chlorhexidine 2% Cloths 1 Application(s) Topical once  cholecalciferol 1000 Unit(s) Oral daily  epoetin shakila-epbx (RETACRIT) Injectable 58673 Unit(s) IV Push <User Schedule>  famotidine  Oral Tab/Cap - Peds 20 milliGRAM(s) Oral at bedtime  fenofibrate Tablet 145 milliGRAM(s) Oral daily  levothyroxine 75 MICROGram(s) Oral daily  melatonin 5 milliGRAM(s) Oral at bedtime  metoprolol tartrate 25 milliGRAM(s) Oral two times a day  pantoprazole   Suspension 40 milliGRAM(s) Oral daily  phenazopyridine 100 milliGRAM(s) Oral every 8 hours  sevelamer carbonate 800 milliGRAM(s) Oral three times a day  tamsulosin 0.4 milliGRAM(s) Oral at bedtime      MEDICATIONS  (PRN):  acetaminophen   Tablet .. 650 milliGRAM(s) Oral once PRN Moderate Pain (4 - 6)  polyethylene glycol 3350 17 Gram(s) Oral daily PRN Constipation HOSPITALIST PROGRESS NOTE    TAMY WHITE  [id number]  85yMale    Patient is a 85y old  Male who presents with a chief complaint of Syncope (2020 09:18)      SUBJECTIVE:   Chart reviewed since admission.   Patient seen and examined at bedside for Syncope, COVID+.  Denies any syncope, dizziness, headache, paresthesia or paresis.    OBJECTIVE:  Vital Signs Last 24 Hrs  T(C): 36.4 (2020 07:30), Max: 36.4 (2020 17:05)  T(F): 97.6 (2020 07:30), Max: 97.6 (2020 07:30)  HR: 90 (2020 07:30) (90 - 102)  BP: 107/70 (2020 07:30) (107/70 - 143/77)   RR: 16 (2020 07:30) (16 - 18)  SpO2: 100% (2020 07:30) (98% - 100%)    PHYSICAL EXAMINATION  General: walking around in room.  HEENT:  right forehead bruise. EOMI  NECK:  Supple  CVS: regular rate and rhythm S1 S2  RESP:  CTAB  GI:  Soft nondistended nontender BS+  : No suprapubic tenderness  MSK:  FROM, no edema  CNS:  No gross focal or global deficit noted  INTEG:  bruises+  PSYCH:  fair mood, AAox3    MONITOR:  CAPILLARY BLOOD GLUCOSE            I&O's Summary    2020 07:01  -  2020 07:00  --------------------------------------------------------  IN: 700 mL / OUT: 2000 mL / NET: -1300 mL                            9.9    5.26  )-----------( 111      ( 2020 07:35 )             32.2       06-12    132<L>  |  96<L>  |  29.0<H>  ----------------------------<  95  4.2   |  26.0  |  5.11<H>    Ca    8.7      2020 07:35  Phos  3.9     06-12  Mg     2.2     06-12    Thyroid Stimulating Hormone, Serum: 4.80 uIU/mL (20 @ 06:00)          Urinalysis Basic - ( 2020 12:36 )    Color: Yellow / Appearance: Clear / S.010 / pH: x  Gluc: x / Ketone: Negative  / Bili: Negative / Urobili: Negative mg/dL   Blood: x / Protein: 100 mg/dL / Nitrite: Negative   Leuk Esterase: Negative / RBC: Negative /HPF / WBC 3-5   Sq Epi: x / Non Sq Epi: Occasional / Bacteria: Negative      COVID-19 PCR: Detected: This test has been validated by [employer] to be accurate;  though it has not been FDA cleared/approved by the usual pathway.  As with all laboratory tests, results should be correlated with clinical  findings.  https://www.fda.gov/media/761860/download  https://www.fda.gov/media/767586/download (06.10.20 @ 00:33)    COVID-19  Antibody - for prior infection screening (20 @ 15:37)    COVID-19 IgG Antibody Index: 2.39: Abbott CMIA  Negative Result    <= 1.39 Index  Positive Result      >= 1.40 Index Index    COVID-19 IgG Antibody Interpretation: Positive: This test has not been reviewed by the FDA by the standard review  process. It has been authorized for emergency use by the FDA. [facility] has validated this test to be accurate.  Negative results do not rule out SARS-CoV-2 infection, particularly in  those who have been in recent contact with the virus. Follow-up testing  with a molecular diagnostic test should be considered to rule out  infection in these individuals.  Results from antibody testing should not be used as thesole basis to  diagnose or exclude SARS-CoV-2 infection, or to inform infection status.  Positive results may rarely be due to past or present infection with  non-SARS-CoV-2 coronavirus strains, such as coronavirus HKU1, NL63, OC43,  or 229E. [facility], through extensive validation  testing, has confirmed that this risk is minimal with this test.      Culture:  Culture - Urine (20 @ 21:21)    Specimen Source: .Urine Clean Catch (Midstream)    Culture Results:   10,000 - 49,000 CFU/mL Alpha hemolytic strep "Susceptibilities not  performed"  <10,000 CFU/ml of other organisms      TTE:    < from: TTE Echo Complete w/o contrast w/ Doppler (20 @ 14:19) >     EXAM:  ECHO TTE WO CON COMP W DOPP         PROCEDURE DATE:  2020        INTERPRETATION:  Transthoracic Echocardiography Report (TTE)     Demographics     Patient name       CINDY MORELOS   Age           85 year(s)                      KEARA     Med Rec #          [id number]           Gender        Male     Account #          [id number]        Date of Birth 1935     Interpreting       Dayan Jenkins MD    Room Number   0293   Physician     Referring          Dayan Jenkins MDSonographer   Les Cutler RDCS   Physician     Date of study      2020 08:08 AM     Height             68.9 in             Weight        169.98 pounds    Type of Study:     TTE procedure: ECHO TTE WO CON COMP W DOP     BP: 110/49 mmHg     Study Location: 2SWTechnical Quality: Fair    Indications   1) R94.31 - Abnormal electrocardiogram ECG EKG    M-Mode Measurements (cm)     LVEDd: 4.22 cm            LVESd: 3 cm   IVSEd: 1.4 cm   LVPWd: 1.13 cm            AO Root Dimension: 2.7 cm                         LA: 4.9 cm    Doppler Measurements:     AV Velocity:204 cm/s                MV Peak E-Wave: 133 cm/s   AV Peak Gradient: 16.65 mmHg   AV Mean Gradient: 9 mmHg            MV Peak Gradient: 7.08 mmHg   TR Velocity:308 cm/s   TR Gradient:37.9456 mmHg   Estimated RAP:10 mmHg   RVSP:48 mmHg     Findings     Mitral Valve   Mild mitral annular calcification is present.   Fibrocalcific changes noted to the mitral valve leaflets with preserved   leaflet excursion.   Moderate (2+) mitral regurgitation is present.     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 Peak   trans-prosthetic gradient is within normal limitations for this type of   prosthesis.     Tricuspid Valve   The tricuspid valve leaflets are thin and pliable; valve motion is normal.   Moderate (2+) tricuspid valve regurgitation is present.   Mild to moderate pulmonary hypertension.     Pulmonic Valve   Mild pulmonic valvular regurgitation (1+) is present.     Left Atrium   The leftatrium is moderately dilated.     Left Ventricle   Mild concentric left ventricular hypertrophy is present.   Estimated left ventricular ejection fraction is 60 %.     Right Atrium   The right atrium appears moderately dilated.     Right Ventricle   The right ventricle is at the upper limits of normal in size.     Pericardial Effusion   No evidence of pericardial effusion.     Pleural Effusion   No evidence of pleural effusion.     Miscellaneous   The IVC is dilated with decreased respiratory variation     Impression     Summary     Mild concentric left ventricular hypertrophy is present.   Estimated left ventricular ejection fraction is 60 %.   The left atrium is moderately dilated.   The right atrium appears moderately dilated.   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 Peak   trans-prosthetic gradient is within normal limitations for this type of   prosthesis.   Mild mitral annular calcification is present.   Fibrocalcific changes noted to the mitral valve leaflets with preserved   leaflet excursion.   Moderate (2+) mitral regurgitation is present.   The tricuspid valve leaflets are thin and pliable; valve motion is normal.   Moderate (2+) tricuspid valve regurgitation is present.   Mild to moderate pulmonary hypertension.   Mild pulmonic valvular regurgitation (1+) is present.   The IVC is dilated with decreased respiratory variation     Signature     ----------------------------------------------------------------   Electronically signed by Dayan Jenkins MD(Interpreting   physician) on 2020 04:22 PM   ----------------------------------------------------------------    Valves     Mitral Valve     Peak E-Wave: 133 cm/s   Peak Gradient: 7.08 mmHg     Aortic Valve     Peak Velocity: 204 cm/s       Mean Gradient: 9 mmHg   Peak Gradient: 16.65 mmHg                AV VTI: 41.8 cm     Tricuspid Valve     TR Velocity: 308 cm/s                  Estimated RAP: 10 mmHg   TR Gradient: 37.9456 mmHg              Estimated RVSP: 48 mmHg     Pulmonic Valve              Estimated PASP: 47.95 mmHg     LVOT     Peak Velocity: 79.5 cm/s   Peak Gradient: 3 mmHg    Structures     Left Atrium     LA Dimension: 4.9 cm          LA Area: 29.6 cm^2   LA/Aorta: 1.81                LA Volume/Index: 89 ml /46m^2     Left Ventricle     Diastolic Dimension: 4.22 cm            Systolic Dimension: 3 cm   Septum Diastolic: 1.4 cm   PW Diastolic: 1.13 cm     FS: 28.91 %     Right Atrium     RA Systolic Pressure: 10 mmHg     Right Ventricle              RV Systolic Pressure: 47.95 mmHg     Miscellaneous     Aorta     Aortic Root: 2.7 cm                    DAYAN JENKINS M.D., ATTENDING CARDIOLOGIST  This document has been electronically signed. May 11 2020  4:22PM        < end of copied text >  RADIOLOGY  < from: CT Chest w/ IV Cont (20 @ 21:44) >     EXAM:  CT CHEST IC                          PROCEDURE DATE:  2020          INTERPRETATION:  EXAM:CT CHEST WITH IV CONTRAST.     CLINICAL INDICATION: r/o svc thrombus .     TECHNIQUE: CT imaging was performed following the administration of intravenous contrast. Early and mid venous phase acquisitions were obtained. Axial, coronal and MIP scans are reviewed. 92 mL of Omnipaque 300 was utilized for contrast enhancement and 8 mL was discarded.     PRIOR EXAM: 2020 at 3:21 PM.     FINDINGS:       CERVICOTHORACIC JUNCTION AND AXILLARY REGIONS: Within normal limits.    MEDIASTINUM AND SADAF: Suspect retained secretions adherent to the left tracheal wall. Evidence of bronchial plugging in the lower lobes.     Low densities adjacent to the central line in the SVC demonstrate an average density of 116 Hounsfield units. There is also change in appearance between the 2 acquisitions. In view of this, the appearance is probably related to artifact rather than a thrombus.    Mild right infrahilar and subcarinal lymphadenopathy. Coronary artery calcification. Evidence of a TAVR.    LUNG PARENCHYMA: Dependent lung subsegmental atelectasis/infiltrates involving the lower lobes bilaterally. Scattered patchy groundglass and reticular opacities involving the peripheral lungs bilaterally that could be related to an inflammatory process.    PLEURA: Trace bilateral pleural effusions.    UPPER ABDOMEN: Cholelithiasis. Diminished cortical thickness involving the kidneys bilaterally and retained contrast within the collecting systems from presumably prior IV contrast administration, possibly related to diminished renal function. Possibility of small renal cysts that cannot be definitely characterized.    THORACIC WALL AND OSSEOUS STRUCTURES: Within normal limits.      IMPRESSION:     Low densities adjacent to the central line in the SVC probably represent artifact as described above.    Additional findings as above.      Nurse Rabago was informed with read back verification.                  SAVANNAH DUENAS M.D., ATTENDING RADIOLOGIST  This document has been electronically signed. 2020  9:22AM        < end of copied text >      < from: CT Head No Cont (20 @ 20:14) >     EXAM:  CT BRAIN                          PROCEDURE DATE:  2020          INTERPRETATION:  HISTORY: Injury    Evaluation demonstrates no evidence of mass-effect or midline shift. No intraparenchymal mass lesions or hemorrhage is identified. There is no evidence of hydrocephalus. No extra-axial collections are noted.    The bone windows demonstrate no gross osseous abnormalities.    Impression:  1. Unremarkable noncontrast CT scan of the brain.                      LELA MARTINS M.D. ATTENDING RADIOLOGIST  This document has been electronically signed. 2020  8:24PM    < end of copied text >    < from: EEG Awake and Asleep (20 @ 14:33) >    EXAM:  EEG-AWAKE AND ASLEEP      PROCEDURE DATE:  2020   .      INTERPRETATION:  Technique: This 18-channel EEG with 1 channel devoted to EKG is performed with the patient in the awake and drowsy state. Electrodes were placed according to the standard 10-20 system.    Background: The wakeful background was well-organized with a posterior dominant rhythm of 6-7 Hz. There is low voltage fast activity anteriorly. The remainder of the background consisted of a mixture of theta frequencies with beta activity superimposed.    Drowsiness was characterized by diffuse slowing of the record and the appearance of symmetric vertex waves.     Stage II sleep was not attained.     Stimulation: Photic stimulation produced no change in the background.    Specific features: No focal, lateralizing, or epileptiform activity was present.    Impression: This is an abnormal record characterized by generalized background slowing. It is indicative of diffuse cerebral dysfunction.          IMPRESSION:      Read By: DIMAS CARMONA M.D., NEUROLOGY ATTENDING  2020  2:36PM.                  Signed By: DIMSA CARMONA M.D., NEUROLOGY ATTENDING  2020  2:37PM  This report has been electronically signed.    < end of copied text >      MEDICATIONS  (STANDING):  chlorhexidine 2% Cloths 1 Application(s) Topical once  cholecalciferol 1000 Unit(s) Oral daily  epoetin shakila-epbx (RETACRIT) Injectable 30869 Unit(s) IV Push <User Schedule>  famotidine  Oral Tab/Cap - Peds 20 milliGRAM(s) Oral at bedtime  fenofibrate Tablet 145 milliGRAM(s) Oral daily  levothyroxine 75 MICROGram(s) Oral daily  melatonin 5 milliGRAM(s) Oral at bedtime  metoprolol tartrate 25 milliGRAM(s) Oral two times a day  pantoprazole   Suspension 40 milliGRAM(s) Oral daily  phenazopyridine 100 milliGRAM(s) Oral every 8 hours  sevelamer carbonate 800 milliGRAM(s) Oral three times a day  tamsulosin 0.4 milliGRAM(s) Oral at bedtime      MEDICATIONS  (PRN):  acetaminophen   Tablet .. 650 milliGRAM(s) Oral once PRN Moderate Pain (4 - 6)  polyethylene glycol 3350 17 Gram(s) Oral daily PRN Constipation

## 2020-06-13 NOTE — PROGRESS NOTE ADULT - SUBJECTIVE AND OBJECTIVE BOX
[facility] DIVISION OF KIDNEY DISEASES AND HYPERTENSION -- HEMODIALYSIS NOTE  --------------------------------------------------------------------------------  Chief Complaint: ESRD/Ongoing hemodialysis requirement    24 hour events/subjective:        PAST HISTORY  --------------------------------------------------------------------------------  No significant changes to PMH, PSH, FHx, SHx, unless otherwise noted    ALLERGIES & MEDICATIONS  --------------------------------------------------------------------------------  Allergies    No Known Allergies    Intolerances      Standing Inpatient Medications  chlorhexidine 2% Cloths 1 Application(s) Topical once  cholecalciferol 1000 Unit(s) Oral daily  epoetin shakila-epbx (RETACRIT) Injectable 30428 Unit(s) IV Push <User Schedule>  famotidine  Oral Tab/Cap - Peds 20 milliGRAM(s) Oral at bedtime  fenofibrate Tablet 145 milliGRAM(s) Oral daily  levothyroxine 75 MICROGram(s) Oral daily  melatonin 5 milliGRAM(s) Oral at bedtime  metoprolol tartrate 25 milliGRAM(s) Oral two times a day  pantoprazole   Suspension 40 milliGRAM(s) Oral daily  phenazopyridine 100 milliGRAM(s) Oral every 8 hours  sevelamer carbonate 800 milliGRAM(s) Oral three times a day  tamsulosin 0.4 milliGRAM(s) Oral at bedtime    PRN Inpatient Medications  acetaminophen   Tablet .. 650 milliGRAM(s) Oral once PRN  polyethylene glycol 3350 17 Gram(s) Oral daily PRN      REVIEW OF SYSTEMS  --------------------------------------------------------------------------------  Gen: No weight changes, fatigue, fevers/chills, weakness  Skin: No rashes  Head/Eyes/Ears/Mouth: No headache  Respiratory: No dyspnea, cough,  CV: No chest pain, orthopnea  GI: No abdominal pain, diarrhea, constipation, nausea, vomiting,  MSK: No joint pain  Neuro: No dizziness/lightheadedness, weakness  Heme: No bleeding  Psych: No significant nervousness, anxiety, stress, depression    All other systems were reviewed and are negative, except as noted.    VITALS/PHYSICAL EXAM  --------------------------------------------------------------------------------  T(C): 36.4 (06-13-20 @ 07:30), Max: 36.4 (06-12-20 @ 17:05)  HR: 90 (06-13-20 @ 07:30) (90 - 102)  BP: 107/70 (06-13-20 @ 07:30) (107/70 - 143/77)  RR: 16 (06-13-20 @ 07:30) (16 - 18)  SpO2: 100% (06-13-20 @ 07:30) (98% - 100%)  Wt(kg): --        06-12-20 @ 07:01  -  06-13-20 @ 07:00  --------------------------------------------------------  IN: 700 mL / OUT: 2000 mL / NET: -1300 mL      Physical Exam:  	Gen: NAD, well-appearing  	HEENT: PERRL, supple neck,  	Pulm: CTA B/L  	CV: RRR, S1S2; no rub  	Abd: +BS, soft, nontender  	UE: Warm, intact strength; no asterixis  	LE: Warm, + edema  	Neuro: No focal deficits  	Psych: Normal affect and mood  	Skin: Warm, without rashes  	Vascular access:    LABS/STUDIES  --------------------------------------------------------------------------------              9.9    5.26  >-----------<  111      [06-12-20 @ 07:35]              32.2     132  |  96  |  29.0  ----------------------------<  95      [06-12-20 @ 07:35]  4.2   |  26.0  |  5.11        Ca     8.7     [06-12-20 @ 07:35]      Mg     2.2     [06-12-20 @ 07:35]      Phos  3.9     [06-12-20 @ 07:35]            HbA1c 5.2      [03-13-16 @ 00:28]  TSH 4.80      [06-11-20 @ 06:00]    HBsAg Nonreact      [06-12-20 @ 22:55]  HCV 0.20, Nonreact      [06-12-20 @ 22:55] [facility] DIVISION OF KIDNEY DISEASES AND HYPERTENSION -- HEMODIALYSIS NOTE  --------------------------------------------------------------------------------  Chief Complaint: ESRD/Ongoing hemodialysis requirement    24 hour events/subjective:  s/p HD yesterday; tolerated well        PAST HISTORY  --------------------------------------------------------------------------------  No significant changes to PMH, PSH, FHx, SHx, unless otherwise noted    ALLERGIES & MEDICATIONS  --------------------------------------------------------------------------------  Allergies    No Known Allergies    Intolerances      Standing Inpatient Medications  chlorhexidine 2% Cloths 1 Application(s) Topical once  cholecalciferol 1000 Unit(s) Oral daily  epoetin shakila-epbx (RETACRIT) Injectable 03591 Unit(s) IV Push <User Schedule>  famotidine  Oral Tab/Cap - Peds 20 milliGRAM(s) Oral at bedtime  fenofibrate Tablet 145 milliGRAM(s) Oral daily  levothyroxine 75 MICROGram(s) Oral daily  melatonin 5 milliGRAM(s) Oral at bedtime  metoprolol tartrate 25 milliGRAM(s) Oral two times a day  pantoprazole   Suspension 40 milliGRAM(s) Oral daily  phenazopyridine 100 milliGRAM(s) Oral every 8 hours  sevelamer carbonate 800 milliGRAM(s) Oral three times a day  tamsulosin 0.4 milliGRAM(s) Oral at bedtime    PRN Inpatient Medications  acetaminophen   Tablet .. 650 milliGRAM(s) Oral once PRN  polyethylene glycol 3350 17 Gram(s) Oral daily PRN      REVIEW OF SYSTEMS  --------------------------------------------------------------------------------  Gen: No weight changes, fatigue, fevers/chills, weakness  Skin: No rashes  Head/Eyes/Ears/Mouth: No headache  Respiratory: No dyspnea, cough,  CV: No chest pain, orthopnea  GI: No abdominal pain, diarrhea, constipation, nausea, vomiting,  MSK: No joint pain  Neuro: No dizziness/lightheadedness, weakness  Heme: No bleeding  Psych: No significant nervousness, anxiety, stress, depression    All other systems were reviewed and are negative, except as noted.    VITALS/PHYSICAL EXAM  --------------------------------------------------------------------------------  T(C): 36.4 (06-13-20 @ 07:30), Max: 36.4 (06-12-20 @ 17:05)  HR: 90 (06-13-20 @ 07:30) (90 - 102)  BP: 107/70 (06-13-20 @ 07:30) (107/70 - 143/77)  RR: 16 (06-13-20 @ 07:30) (16 - 18)  SpO2: 100% (06-13-20 @ 07:30) (98% - 100%)  Wt(kg): --        06-12-20 @ 07:01  -  06-13-20 @ 07:00  --------------------------------------------------------  IN: 700 mL / OUT: 2000 mL / NET: -1300 mL      Physical Exam:  	Gen: NAD, well-appearing  	HEENT: PERRL, supple neck,  	Pulm: CTA B/L  	CV: RRR, S1S2; no rub  	Abd: +BS, soft, nontender  	UE: Warm, intact strength; no asterixis  	LE: Warm, + edema  	Neuro: No focal deficits  	Psych: Normal affect and mood  	Skin: Warm, without rashes  	Vascular access: CVC    LABS/STUDIES  --------------------------------------------------------------------------------              9.9    5.26  >-----------<  111      [06-12-20 @ 07:35]              32.2     132  |  96  |  29.0  ----------------------------<  95      [06-12-20 @ 07:35]  4.2   |  26.0  |  5.11        Ca     8.7     [06-12-20 @ 07:35]      Mg     2.2     [06-12-20 @ 07:35]      Phos  3.9     [06-12-20 @ 07:35]            HbA1c 5.2      [03-13-16 @ 00:28]  TSH 4.80      [06-11-20 @ 06:00]    HBsAg Nonreact      [06-12-20 @ 22:55]  HCV 0.20, Nonreact      [06-12-20 @ 22:55]

## 2020-06-13 NOTE — DISCHARGE NOTE PROVIDER - NSDCCPCAREPLAN_GEN_ALL_CORE_FT
PRINCIPAL DISCHARGE DIAGNOSIS  Diagnosis: Syncope, unspecified syncope type  Assessment and Plan of Treatment: Continue medications as prescribed.  Follow up with Cardiology      SECONDARY DISCHARGE DIAGNOSES  Diagnosis: Atrial fibrillation  Assessment and Plan of Treatment: Continue medications as prescribed.  Follow up with Cardiology    Diagnosis: Dyslipidemia  Assessment and Plan of Treatment: Continue diet and medications as prescribed    Diagnosis: ESRD (end stage renal disease) on dialysis  Assessment and Plan of Treatment: Continue diet and medications as prescribed.  Follow up for HD as scheduled    Diagnosis: HTN (hypertension)  Assessment and Plan of Treatment: Continue diet and medications as prescribed    Diagnosis: Thrombocytopenia  Assessment and Plan of Treatment: Repeat bloodwork in 1 week    Diagnosis: Anemia in chronic kidney disease, on chronic dialysis  Assessment and Plan of Treatment: Epogen at HD

## 2020-08-05 NOTE — PROGRESS NOTE ADULT - ASSESSMENT
84 yo man with ESRD post recent hosp for GIB now positive for COVID 19, anemia.    - ESRD TTS   - HD now as ordered, UF as tolerated. K protocol    Anemia  - GIB h/h stable fu trend of hgb    GI/medicine follow   - anemia epo with hd     Covid  medical follow up    d/c with HD staff chair side 05-Aug-2020 16:34 86 yo man with ESRD post recent hosp for GIB now positive for COVID 19, anemia.    - ESRD TTS   - HD now as ordered, UF as tolerated. K protocol  -Labs being checked with HD    Anemia  - GIB h/h stable fu trend of hgb    GI/medicine follow   - anemia epo with hd     Covid  medical follow up    d/c with HD staff chair side

## 2020-11-24 ENCOUNTER — APPOINTMENT (OUTPATIENT)
Dept: NEUROLOGY | Facility: CLINIC | Age: 85
End: 2020-11-24

## 2020-11-24 ENCOUNTER — APPOINTMENT (OUTPATIENT)
Dept: NEUROLOGY | Facility: CLINIC | Age: 85
End: 2020-11-24
Payer: MEDICARE

## 2020-11-24 VITALS
OXYGEN SATURATION: 98 % | BODY MASS INDEX: 28.99 KG/M2 | WEIGHT: 174 LBS | TEMPERATURE: 97.3 F | HEIGHT: 65 IN | HEART RATE: 85 BPM | DIASTOLIC BLOOD PRESSURE: 68 MMHG | SYSTOLIC BLOOD PRESSURE: 129 MMHG

## 2020-11-24 DIAGNOSIS — R25.1 TREMOR, UNSPECIFIED: ICD-10-CM

## 2020-11-24 DIAGNOSIS — R41.82 ALTERED MENTAL STATUS, UNSPECIFIED: ICD-10-CM

## 2020-11-24 DIAGNOSIS — Z85.9 PERSONAL HISTORY OF MALIGNANT NEOPLASM, UNSPECIFIED: ICD-10-CM

## 2020-11-24 PROCEDURE — 99214 OFFICE O/P EST MOD 30 MIN: CPT

## 2020-11-24 PROCEDURE — 99072 ADDL SUPL MATRL&STAF TM PHE: CPT

## 2020-11-25 DIAGNOSIS — R26.89 OTHER ABNORMALITIES OF GAIT AND MOBILITY: ICD-10-CM

## 2020-11-25 PROBLEM — Z85.9 HISTORY OF MALIGNANT NEOPLASM: Status: RESOLVED | Noted: 2020-11-24 | Resolved: 2020-11-25

## 2020-11-25 PROBLEM — R25.1 TREMOR: Status: ACTIVE | Noted: 2020-11-25

## 2020-11-25 NOTE — REVIEW OF SYSTEMS
[As Noted in HPI] : as noted in HPI [Negative] : Constitutional [FreeTextEntry1] : limited due to patient's current mental status

## 2020-11-25 NOTE — DATA REVIEWED
[de-identified] : MRI Brain 11/18/20 - chronic cerebellar infarct, otherwise unremarkable [de-identified] : 6/2020 - mild diffuse cerebral dysfunction, no epileptiform abnormalities

## 2020-11-25 NOTE — DISCUSSION/SUMMARY
[FreeTextEntry1] : 86 yo man with slowly progressive worsening gait over the past year with tremor which developed a month ago and rapidly progressive memory loss over the past 4 months as per daughter of unclear etiology. He was recently admitted to [facility] and as per daughter no cause was found aside from an elevated ammonia level which was corrected. He had a recent MRI brain which was unrevealing. He appears parkinson like on examination, however, this disease should not progress this quickly. Will obtain HAFSA scan to help aide in diagnosis. If HAFSA scan unrevealing, will consider PET scan to look for dementia like process. Will obtain routine EEG, repeat ammonia, check paraneoplastic panel given hx of T cell lymphom and reported rapidly progressing memory problems as well as HAFSA scan and see patient back in 7-10 days. Daughter was advised to take patient to ED for any acute worsening of symtpoms.  She is in agreement with this plan.

## 2020-11-25 NOTE — PHYSICAL EXAM
[FreeTextEntry1] : Mental Status: awake, alert, oriented to person only, he states the month is January and is unable to state the year, he perseverates, he is calm, he follows some simple commands, speaks in full sentences, simple naming intact\par CN: PERRL, EOMI, VFF, V1-V3 sensation intact, hearing grossly intact, no facial asymmetry, tongue midline\par Motor: 5/5 x 4 extremities, mild bilateral cogwheel rigidity at wrists bilaterally, tremor noted in right hand\par Sensory: intact to light touch throughout\par Reflexes: 1+ throughout, toes equivocal bilaterally\par Gait: shuffling gait, walks with a walker\par \par  [General Appearance - Alert] : alert [General Appearance - In No Acute Distress] : in no acute distress [Sclera] : the sclera and conjunctiva were normal [PERRL With Normal Accommodation] : pupils were equal in size, round, reactive to light, with normal accommodation [Outer Ear] : the ears and nose were normal in appearance [Both Tympanic Membranes Were Examined] : both tympanic membranes were normal [Neck Appearance] : the appearance of the neck was normal

## 2020-11-25 NOTE — HISTORY OF PRESENT ILLNESS
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 He is accompanied to the visit by his daughter Merle who aided in giving history. She states over the past year, he has developed slowly progressive difficulty with walking. She states he was using a cane up until a month ago, when he needed a walker. She states over the past 4 months, he has had a rapid change in memory. She states that he was cognitively intact and paying his own bills as well as taking care of finances prior to that. She states that a week ago, he was with her sister and he took off all of his clothes and was agitated. She states this is very out of character for him, so they took him to the hospital. He went to [facility]. She states he had an MRI brain which was unrevealing. She was told his ammonia was high, but not enough to explain the degree of memory loss. He was started on lactulose which he is still taking. She states he was instructed to follow up outpatient with his PCP for further management. She is unsure if he was given a diagnosis. She states he has hx of alcohol abuse or cirrhosis and an US abdomen was done. She states 4 years ago, he had a cerebellar infarct and had no residual deficits from this stroke. She states he has had urinary incontinence for over a year now. She reports, he usually is very talkative and opinionated, but now he only speaks when spoken to. She states he yells and becomes agitated over the past few weeks which is new. She denies him becoming violent. He denies a headache. She denies him having a fever.  She states in May 2020, he had covid and a GI bleed and was told to stop eliquis and plavix indefinitely by GI. In June 2020, she states he had an episode of LOC which was thought to be syncope. He was seen by neurology in the hospital at that time and there was low suspicion for seizure. He had a routine EEG in June 2020 which showed mild diffuse slowing, but was otherwise unremarkable. She has no further complaints.

## 2020-12-08 ENCOUNTER — NON-APPOINTMENT (OUTPATIENT)
Age: 85
End: 2020-12-08

## 2020-12-09 ENCOUNTER — NON-APPOINTMENT (OUTPATIENT)
Age: 85
End: 2020-12-09

## 2020-12-11 ENCOUNTER — APPOINTMENT (OUTPATIENT)
Dept: NEUROLOGY | Facility: CLINIC | Age: 85
End: 2020-12-11
Payer: MEDICARE

## 2020-12-11 VITALS
BODY MASS INDEX: 28.92 KG/M2 | TEMPERATURE: 97.9 F | OXYGEN SATURATION: 96 % | DIASTOLIC BLOOD PRESSURE: 66 MMHG | WEIGHT: 173.56 LBS | HEART RATE: 83 BPM | HEIGHT: 65 IN | SYSTOLIC BLOOD PRESSURE: 108 MMHG

## 2020-12-11 DIAGNOSIS — G93.40 ENCEPHALOPATHY, UNSPECIFIED: ICD-10-CM

## 2020-12-11 DIAGNOSIS — E72.20 DISORDER OF UREA CYCLE METABOLISM, UNSPECIFIED: ICD-10-CM

## 2020-12-11 PROCEDURE — 99214 OFFICE O/P EST MOD 30 MIN: CPT

## 2020-12-11 PROCEDURE — 99072 ADDL SUPL MATRL&STAF TM PHE: CPT

## 2020-12-11 NOTE — REVIEW OF SYSTEMS
[As Noted in HPI] : as noted in HPI [Negative] : Constitutional [FreeTextEntry1] : limited due to patient's cognition

## 2020-12-11 NOTE — HISTORY OF PRESENT ILLNESS
[FreeTextEntry1] : Interval History:\par Patient presents to the appointment with his son. His son states that patient has had a cognitive decline in the summer which initially started slowly and then rapidly worsened. He now has been improving recently. He states his thinking is clearer and he no longer becomes agitated. He has had no recent falls. His ammonia level at last visit was found to be 132. I spoke to patient's gastroenterologist, Dr. Patten who is prescribing him lactulose to help lower this. He has ordered a CT abdomen with contrast to help identify a cause for hyperammonemia. Son states, since patient has been discharged from the hospital and been on lactulose, his mental status has been improving. He had a repeat level ordered by Dr. Patten this morning. They have not increased the dose of lactulose as of yet as this makes patient very bloated. Patient's tremor has improved. He is scheduled for an EEG tomorrow. Son has no further complaints. 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 He is accompanied to the visit by his daughter Merle who aided in giving history. She states over the past year, he has developed slowly progressive difficulty with walking. She states he was using a cane up until a month ago, when he needed a walker. She states over the past 4 months, he has had a rapid change in memory. She states that he was cognitively intact and paying his own bills as well as taking care of finances prior to that. She states that a week ago, he was with her sister and he took off all of his clothes and was agitated. She states this is very out of character for him, so they took him to the hospital. He went to [facility]. She states he had an MRI brain which was unrevealing. She was told his ammonia was high, but not enough to explain the degree of memory loss. He was started on lactulose which he is still taking. She states he was instructed to follow up outpatient with his PCP for further management. She is unsure if he was given a diagnosis. She states he has hx of alcohol abuse or cirrhosis and an US abdomen was done. She states 4 years ago, he had a cerebellar infarct and had no residual deficits from this stroke. She states he has had urinary incontinence for over a year now. She reports, he usually is very talkative and opinionated, but now he only speaks when spoken to. She states he yells and becomes agitated over the past few weeks which is new. She denies him becoming violent. He denies a headache. She denies him having a fever. She states in May 2020, he had covid and a GI bleed and was told to stop eliquis and plavix indefinitely by GI. In June 2020, she states he had an episode of LOC which was thought to be syncope. He was seen by neurology in the hospital at that time and there was low suspicion for seizure. He had a routine EEG in June 2020 which showed mild diffuse slowing, but was otherwise unremarkable. She has no further complaints. \par

## 2020-12-11 NOTE — ASSESSMENT
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 LFT's wnl. Dr. Atkinson has ordered a CT abdomen with contrast. Son states patient is only on aspirin 81mg daily for Afib, and if CT abdomen looks okay, will weigh risks vs benefits of restarting eliquis. \par \par Patient is having a routine EEG done tomorrow\par \par Patient and son were advised to notify me for worsening symptoms. Can hold off on HAFSA scan for now as tremor is likely due to hyperammonemia.\par \par \par I will see him back in 6 weeks or sooner if necessary.

## 2020-12-11 NOTE — PHYSICAL EXAM
[General Appearance - Alert] : alert [General Appearance - In No Acute Distress] : in no acute distress [Sclera] : the sclera and conjunctiva were normal [PERRL With Normal Accommodation] : pupils were equal in size, round, reactive to light, with normal accommodation [Outer Ear] : the ears and nose were normal in appearance [Neck Appearance] : the appearance of the neck was normal [] : no respiratory distress [FreeTextEntry1] : Mental Status: awake, alert, oriented to person and place, states the month is december, but cannot state the year, immediate and delayed recall is 3/3, he spells MICKY "MUDO", cannot spell it backwards, follows simple commands. Mental status much improved since last visit, speaks in full sentences\par CN: PERRL, EOMI, VFF, V1-V3 sensation intact, hearing grossly intact, no facial asymmetry, tongue midline\par Motor: 5/5 x 4 extremities, no tremor noted on todays exam\par Sensory: intact to light touch throughout\par Reflexes: 1+ throughout, toes equivocal bilaterally\par Gait: steady with walker\par \par

## 2020-12-12 ENCOUNTER — APPOINTMENT (OUTPATIENT)
Dept: NEUROLOGY | Facility: CLINIC | Age: 85
End: 2020-12-12
Payer: MEDICARE

## 2020-12-12 PROCEDURE — 95819 EEG AWAKE AND ASLEEP: CPT

## 2020-12-12 PROCEDURE — 93040 RHYTHM ECG WITH REPORT: CPT

## 2020-12-12 PROCEDURE — 99072 ADDL SUPL MATRL&STAF TM PHE: CPT

## 2020-12-19 ENCOUNTER — NON-APPOINTMENT (OUTPATIENT)
Age: 85
End: 2020-12-19

## 2021-01-19 ENCOUNTER — APPOINTMENT (OUTPATIENT)
Dept: NEUROLOGY | Facility: CLINIC | Age: 86
End: 2021-01-19
Payer: MEDICARE

## 2021-01-19 VITALS
SYSTOLIC BLOOD PRESSURE: 119 MMHG | HEART RATE: 72 BPM | WEIGHT: 173 LBS | BODY MASS INDEX: 28.82 KG/M2 | TEMPERATURE: 97.8 F | OXYGEN SATURATION: 96 % | HEIGHT: 65 IN | DIASTOLIC BLOOD PRESSURE: 66 MMHG

## 2021-01-19 PROCEDURE — 99072 ADDL SUPL MATRL&STAF TM PHE: CPT

## 2021-01-19 PROCEDURE — 99213 OFFICE O/P EST LOW 20 MIN: CPT

## 2021-01-19 NOTE — HISTORY OF PRESENT ILLNESS
[FreeTextEntry1] : Patient is accompanied to the visit by his son who aided in giving history. He states his father has improved since last visit, but still has some mild memory problems. He denies patient falling or becoming aggressive. He feels his tremor has improved. He had a routine EEG which showed no epileptiform activity. He is following with GI and recently had a CT abdomen pelvis and is following up next week for the results. Patient needs help dressing due to physical limitations. He needs help remembering to take his medication. He is able to feed himself. He is on aspirin alone for Afib due to GI bleed, but family would prefer patient be on anticoagulation once cleared from GI. Risks vs benefits were explained to them in detail.

## 2021-01-19 NOTE — PHYSICAL EXAM
[General Appearance - Alert] : alert [General Appearance - In No Acute Distress] : in no acute distress [Sclera] : the sclera and conjunctiva were normal [PERRL With Normal Accommodation] : pupils were equal in size, round, reactive to light, with normal accommodation [Outer Ear] : the ears and nose were normal in appearance [Neck Appearance] : the appearance of the neck was normal [] : no respiratory distress [FreeTextEntry1] : Mental Status: awake, alert, oriented to person and place, states the month is december, but cannot state the year, speaks in full sentences\par CN: PERRL, EOMI, VFF, V1-V3 sensation intact, hearing grossly intact, no facial asymmetry, tongue midline\par Motor: 5/5 x 4 extremities, no tremor noted on todays exam, no cogwheel rigidity bilaterally\par Sensory: intact to light touch throughout\par Reflexes: 1+ throughout, toes equivocal bilaterally\par Gait: able to walk unassisted, but uses walker for safety\par \par

## 2021-01-19 NOTE — ASSESSMENT
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 LFT's wnl. Dr. Atkinson has ordered a CT abdomen with contrast. Son states patient is only on aspirin 81mg daily for Afib, and if CT abdomen looks okay, would like patient to be restarted on eliquis if okay with GI. He is aware of risks vs benefits. He has an appt with cardiology next month who previously prescribed eliquis.\par \par \par Patient and son were advised to notify me for worsening symptoms. \par \par I will see patient back in 6 months or sooner if necessary to ensure patient does not have an overlying dementia once ammonia consistently improved. Patient and son are in agreement with this plan.\par \par

## 2021-06-16 ENCOUNTER — EMERGENCY (EMERGENCY)
Facility: HOSPITAL | Age: 86
LOS: 0 days | Discharge: LEFT BEFORE DISCHARGE | End: 2021-06-16
Attending: EMERGENCY MEDICINE
Payer: MEDICARE

## 2021-06-16 VITALS
OXYGEN SATURATION: 100 % | SYSTOLIC BLOOD PRESSURE: 132 MMHG | DIASTOLIC BLOOD PRESSURE: 48 MMHG | RESPIRATION RATE: 17 BRPM | TEMPERATURE: 98 F | HEART RATE: 65 BPM

## 2021-06-16 VITALS — HEART RATE: 80 BPM | DIASTOLIC BLOOD PRESSURE: 51 MMHG | SYSTOLIC BLOOD PRESSURE: 119 MMHG

## 2021-06-16 DIAGNOSIS — K92.2 GASTROINTESTINAL HEMORRHAGE, UNSPECIFIED: ICD-10-CM

## 2021-06-16 DIAGNOSIS — Z86.73 PERSONAL HISTORY OF TRANSIENT ISCHEMIC ATTACK (TIA), AND CEREBRAL INFARCTION WITHOUT RESIDUAL DEFICITS: ICD-10-CM

## 2021-06-16 DIAGNOSIS — Z95.5 PRESENCE OF CORONARY ANGIOPLASTY IMPLANT AND GRAFT: Chronic | ICD-10-CM

## 2021-06-16 DIAGNOSIS — Z86.16 PERSONAL HISTORY OF COVID-19: ICD-10-CM

## 2021-06-16 DIAGNOSIS — Z87.448 PERSONAL HISTORY OF OTHER DISEASES OF URINARY SYSTEM: ICD-10-CM

## 2021-06-16 DIAGNOSIS — Z98.890 OTHER SPECIFIED POSTPROCEDURAL STATES: Chronic | ICD-10-CM

## 2021-06-16 DIAGNOSIS — D50.0 IRON DEFICIENCY ANEMIA SECONDARY TO BLOOD LOSS (CHRONIC): ICD-10-CM

## 2021-06-16 DIAGNOSIS — I25.10 ATHEROSCLEROTIC HEART DISEASE OF NATIVE CORONARY ARTERY WITHOUT ANGINA PECTORIS: ICD-10-CM

## 2021-06-16 DIAGNOSIS — Z87.09 PERSONAL HISTORY OF OTHER DISEASES OF THE RESPIRATORY SYSTEM: ICD-10-CM

## 2021-06-16 DIAGNOSIS — N40.0 BENIGN PROSTATIC HYPERPLASIA WITHOUT LOWER URINARY TRACT SYMPTOMS: ICD-10-CM

## 2021-06-16 DIAGNOSIS — Z79.899 OTHER LONG TERM (CURRENT) DRUG THERAPY: ICD-10-CM

## 2021-06-16 DIAGNOSIS — Z98.890 OTHER SPECIFIED POSTPROCEDURAL STATES: ICD-10-CM

## 2021-06-16 DIAGNOSIS — Z87.19 PERSONAL HISTORY OF OTHER DISEASES OF THE DIGESTIVE SYSTEM: ICD-10-CM

## 2021-06-16 DIAGNOSIS — I44.7 LEFT BUNDLE-BRANCH BLOCK, UNSPECIFIED: ICD-10-CM

## 2021-06-16 DIAGNOSIS — Z86.79 PERSONAL HISTORY OF OTHER DISEASES OF THE CIRCULATORY SYSTEM: ICD-10-CM

## 2021-06-16 DIAGNOSIS — E03.9 HYPOTHYROIDISM, UNSPECIFIED: ICD-10-CM

## 2021-06-16 DIAGNOSIS — I48.91 UNSPECIFIED ATRIAL FIBRILLATION: ICD-10-CM

## 2021-06-16 DIAGNOSIS — M10.9 GOUT, UNSPECIFIED: ICD-10-CM

## 2021-06-16 DIAGNOSIS — N18.6 END STAGE RENAL DISEASE: ICD-10-CM

## 2021-06-16 DIAGNOSIS — Z95.2 PRESENCE OF PROSTHETIC HEART VALVE: Chronic | ICD-10-CM

## 2021-06-16 DIAGNOSIS — Z85.828 PERSONAL HISTORY OF OTHER MALIGNANT NEOPLASM OF SKIN: ICD-10-CM

## 2021-06-16 DIAGNOSIS — Z99.2 DEPENDENCE ON RENAL DIALYSIS: ICD-10-CM

## 2021-06-16 DIAGNOSIS — E11.22 TYPE 2 DIABETES MELLITUS WITH DIABETIC CHRONIC KIDNEY DISEASE: ICD-10-CM

## 2021-06-16 DIAGNOSIS — Z79.890 HORMONE REPLACEMENT THERAPY: ICD-10-CM

## 2021-06-16 DIAGNOSIS — I13.2 HYPERTENSIVE HEART AND CHRONIC KIDNEY DISEASE WITH HEART FAILURE AND WITH STAGE 5 CHRONIC KIDNEY DISEASE, OR END STAGE RENAL DISEASE: ICD-10-CM

## 2021-06-16 DIAGNOSIS — E78.00 PURE HYPERCHOLESTEROLEMIA, UNSPECIFIED: ICD-10-CM

## 2021-06-16 DIAGNOSIS — Z98.89 OTHER SPECIFIED POSTPROCEDURAL STATES: Chronic | ICD-10-CM

## 2021-06-16 LAB
ALBUMIN SERPL ELPH-MCNC: 2.3 G/DL — LOW (ref 3.3–5)
ALP SERPL-CCNC: 257 U/L — HIGH (ref 40–120)
ALT FLD-CCNC: 18 U/L — SIGNIFICANT CHANGE UP (ref 12–78)
ANION GAP SERPL CALC-SCNC: 9 MMOL/L — SIGNIFICANT CHANGE UP (ref 5–17)
APTT BLD: 34.5 SEC — SIGNIFICANT CHANGE UP (ref 27.5–35.5)
AST SERPL-CCNC: 20 U/L — SIGNIFICANT CHANGE UP (ref 15–37)
BASOPHILS # BLD AUTO: 0.02 K/UL — SIGNIFICANT CHANGE UP (ref 0–0.2)
BASOPHILS NFR BLD AUTO: 0.2 % — SIGNIFICANT CHANGE UP (ref 0–2)
BILIRUB SERPL-MCNC: 0.6 MG/DL — SIGNIFICANT CHANGE UP (ref 0.2–1.2)
BUN SERPL-MCNC: 39 MG/DL — HIGH (ref 7–23)
CALCIUM SERPL-MCNC: 8.1 MG/DL — LOW (ref 8.5–10.1)
CHLORIDE SERPL-SCNC: 97 MMOL/L — SIGNIFICANT CHANGE UP (ref 96–108)
CO2 SERPL-SCNC: 29 MMOL/L — SIGNIFICANT CHANGE UP (ref 22–31)
CREAT SERPL-MCNC: 5.53 MG/DL — HIGH (ref 0.5–1.3)
EOSINOPHIL # BLD AUTO: 0.28 K/UL — SIGNIFICANT CHANGE UP (ref 0–0.5)
EOSINOPHIL NFR BLD AUTO: 3.3 % — SIGNIFICANT CHANGE UP (ref 0–6)
GLUCOSE SERPL-MCNC: 115 MG/DL — HIGH (ref 70–99)
HCT VFR BLD CALC: 25.7 % — LOW (ref 39–50)
HGB BLD-MCNC: 8.2 G/DL — LOW (ref 13–17)
IMM GRANULOCYTES NFR BLD AUTO: 0.5 % — SIGNIFICANT CHANGE UP (ref 0–1.5)
INR BLD: 1.15 RATIO — SIGNIFICANT CHANGE UP (ref 0.88–1.16)
LYMPHOCYTES # BLD AUTO: 0.86 K/UL — LOW (ref 1–3.3)
LYMPHOCYTES # BLD AUTO: 10.3 % — LOW (ref 13–44)
MCHC RBC-ENTMCNC: 31.9 GM/DL — LOW (ref 32–36)
MCHC RBC-ENTMCNC: 32.4 PG — SIGNIFICANT CHANGE UP (ref 27–34)
MCV RBC AUTO: 101.6 FL — HIGH (ref 80–100)
MONOCYTES # BLD AUTO: 0.63 K/UL — SIGNIFICANT CHANGE UP (ref 0–0.9)
MONOCYTES NFR BLD AUTO: 7.5 % — SIGNIFICANT CHANGE UP (ref 2–14)
NEUTROPHILS # BLD AUTO: 6.56 K/UL — SIGNIFICANT CHANGE UP (ref 1.8–7.4)
NEUTROPHILS NFR BLD AUTO: 78.2 % — HIGH (ref 43–77)
PLATELET # BLD AUTO: 138 K/UL — LOW (ref 150–400)
POTASSIUM SERPL-MCNC: 3.1 MMOL/L — LOW (ref 3.5–5.3)
POTASSIUM SERPL-SCNC: 3.1 MMOL/L — LOW (ref 3.5–5.3)
PROT SERPL-MCNC: 7.4 GM/DL — SIGNIFICANT CHANGE UP (ref 6–8.3)
PROTHROM AB SERPL-ACNC: 13.2 SEC — SIGNIFICANT CHANGE UP (ref 10.6–13.6)
RBC # BLD: 2.53 M/UL — LOW (ref 4.2–5.8)
RBC # FLD: 17 % — HIGH (ref 10.3–14.5)
SODIUM SERPL-SCNC: 135 MMOL/L — SIGNIFICANT CHANGE UP (ref 135–145)
TROPONIN I SERPL-MCNC: <0.015 NG/ML — SIGNIFICANT CHANGE UP (ref 0.01–0.04)
WBC # BLD: 8.39 K/UL — SIGNIFICANT CHANGE UP (ref 3.8–10.5)
WBC # FLD AUTO: 8.39 K/UL — SIGNIFICANT CHANGE UP (ref 3.8–10.5)

## 2021-06-16 PROCEDURE — 85610 PROTHROMBIN TIME: CPT

## 2021-06-16 PROCEDURE — P9016: CPT

## 2021-06-16 PROCEDURE — 86901 BLOOD TYPING SEROLOGIC RH(D): CPT

## 2021-06-16 PROCEDURE — 93005 ELECTROCARDIOGRAM TRACING: CPT

## 2021-06-16 PROCEDURE — 36430 TRANSFUSION BLD/BLD COMPNT: CPT

## 2021-06-16 PROCEDURE — 86923 COMPATIBILITY TEST ELECTRIC: CPT

## 2021-06-16 PROCEDURE — 36415 COLL VENOUS BLD VENIPUNCTURE: CPT

## 2021-06-16 PROCEDURE — 99291 CRITICAL CARE FIRST HOUR: CPT

## 2021-06-16 PROCEDURE — 85730 THROMBOPLASTIN TIME PARTIAL: CPT

## 2021-06-16 PROCEDURE — 93010 ELECTROCARDIOGRAM REPORT: CPT

## 2021-06-16 PROCEDURE — 80053 COMPREHEN METABOLIC PANEL: CPT

## 2021-06-16 PROCEDURE — 86850 RBC ANTIBODY SCREEN: CPT

## 2021-06-16 PROCEDURE — 80074 ACUTE HEPATITIS PANEL: CPT

## 2021-06-16 PROCEDURE — 86900 BLOOD TYPING SEROLOGIC ABO: CPT

## 2021-06-16 PROCEDURE — 90999 UNLISTED DIALYSIS PROCEDURE: CPT

## 2021-06-16 PROCEDURE — 99291 CRITICAL CARE FIRST HOUR: CPT | Mod: 25

## 2021-06-16 PROCEDURE — 85025 COMPLETE CBC W/AUTO DIFF WBC: CPT

## 2021-06-16 PROCEDURE — 84484 ASSAY OF TROPONIN QUANT: CPT

## 2021-06-16 RX ORDER — SODIUM CHLORIDE 9 MG/ML
3 INJECTION INTRAMUSCULAR; INTRAVENOUS; SUBCUTANEOUS ONCE
Refills: 0 | Status: COMPLETED | OUTPATIENT
Start: 2021-06-16 | End: 2021-06-16

## 2021-06-16 RX ADMIN — SODIUM CHLORIDE 3 MILLILITER(S): 9 INJECTION INTRAMUSCULAR; INTRAVENOUS; SUBCUTANEOUS at 18:32

## 2021-06-16 NOTE — CONSULT NOTE ADULT - ASSESSMENT
85 yo on HD ESRD, MWF, was told to come to  ED for blood in stool.  pt did not get hd today will be dialyzed in ED due to airborn precautions   85 yo on HD ESRD, MWF, was told to come to  ED for blood in stool.  pt did not get hd today will be dialyzed in ED due to airborn precautions  Pts daughter with him, had vegas change yesterday, while changing his clothes noted red blood with stool.  Has h/o Gastric AVM, A Fib, taken off A/C except for asa  not symptomatic, daughter states refusing to stay due to his recent hospital stay at  [facility] for gallstones/ sepsis/ cholecystectomy  Will dialyze, transfuse as needed  Explained to pts daughter if he refuses to stay he will need to sign out ama, but if he cont to have active GI bleed, he will not  be able to see a gastroenterologist in a timely fashion as outpt, and will be risking his health.

## 2021-06-16 NOTE — ED PROVIDER NOTE - CONSTITUTIONAL, MLM
older male, awake, alert, oriented to person, place, time/situation and in no apparent distress, nontoxic normal...

## 2021-06-16 NOTE — ED PROVIDER NOTE - NSFOLLOWUPINSTRUCTIONS_ED_ALL_ED_FT
While in ED you received dialysis with blood transfusion of 1 unit RBC.  You were advised admission for diagnostic workup of your Gi bleed but you refused.  By signing out against medical advice, you accept FULL responsibility for any & all adverse consequences, including, but not limited to, worsening fatigue, shortness of breath, passing out, more rectal bleeding, even death.  Follow up next 2 days with your doctors.        Hemorragia gastrointestinal    LO QUE NECESITA SABER:    La hemorragia (sangrado) gastrointestinal (GI) puede ocurrir en cualquier parte de hahn tracto digestivo. Shaniqua incluye hahn esófago, estómago, intestinos, recto o ano. La hemorragia puede ser de leve a grave. El sangrado puede comenzar repentinamente o comenzar lentamente y durar un período más ace de tiempo. La hemorragia que dura por más tiempo se denomina hemorragia digestiva crónica.     Tracto digestivo         INSTRUCCIONES SOBRE EL [facility]PITAL[facility]:    Llame al 911 en pinky de presentar lo siguiente:  •Usted tiene dificultad para respirar o le falta el aire.      •Usted se desmaya o pierde el conocimiento.      •Usted tiene dolor en el pecho.      Regrese a la mai de emergencias si:  •Usted está demasiado débil o mareado para ponerse de pie.      •Hahn corazón está latiendo más rápido de lo normal.      •Usted vomita shawn o el vómito tiene la apariencia de café molido.      •Usted tiene shawn en las heces.      •Usted tiene dolor o inflamación abdominal.      Comuníquese con hahn médico si:  •Usted tiene evacuaciones intestinales alquitranadas o negras.      •Tiene náuseas o está vomitando.      •Usted tiene acidez estomacal.      •Usted tiene preguntas o inquietudes acerca de hahn condición o cuidado.      Actividad:Repose según le indicaron. Pregunte cuando puede volver a oscar actividades regulares shanna por ejemplo, hahn trabajo. Lentamente aumente oscar actividades diarias.    Nutrición:Pregunte si necesita seguir ange dieta especial. Ange dieta especial puede ayudar a tratar trastornos digestivos y a prevenir problemas shanna hemorragias digestivas. [place] comidas pequeñas más a menudo mientras que lauryn hahn sistema digestivo. Evitar o limitar la cafeína y comidas picantes. También evite los alimentos que causan acidez, náuseas o diarrea.    Cómo prevenir las hemorragias digestivas:  •Manejar afecciones gastrointestinales shanna se indica.Ejemplos de trastornos digestivos incluyen reflujo gastroesofágico, enfermedad ulcerosa péptica y colitis ulcerosa. Cuartelez el medicamento para hahn afección shanna se le indique.      •Limite o no tome MARGARITO.Pregúntele a hahn médico si es seguro que usted tome MARGARITO. Los MARGARITO pueden aumentar el riesgo de úlceras y sangrado gastrointestinal.      •No consuma alcohol.El alcohol puede causar úlceras y várices esofágicas. Las várices esofágicas son hinchazón de los vasos sanguíneos en el esófago. Con el tiempo los vasos sanguíneos se debilitan y pueden sangrar.      •No fume.La nicotina y otros químicos de los cigarrillos y cigarros pueden aumentar el riesgo de úlceras. Pida información a hahn médico si usted actualmente fuma y necesita ayuda para dejar de fumar. Los cigarrillos electrónicos o el tabaco sin humo igualmente contienen nicotina. Consulte con hahn médico antes de utilizar estos productos.      Acuda a oscar consultas de control con hahn médico según le indicaron.Es posible que deba regresar para que le peter ange colonoscopía, endoscopía y otros estudios. Estas pruebas pueden hacer que no tenga más sangrado. Anote oscar preguntas para que se acuerde de hacerlas lo oscar visitas.          Hemodiálisis, cuidados posteriores    Hemodialysis, Care After      Esta hoja le tomer información sobre cómo cuidarse después del procedimiento. Hahn médico también podrá darle instrucciones más específicas. Comuníquese con el médico si tiene problemas o preguntas.      ¿Qué puedo esperar después del procedimiento?  Después del procedimiento, es normal tener los siguientes síntomas:  •Cansancio (fatiga). Los niveles de energía por lo general se normalizan el día después del procedimiento.      •Picazón. Hahn médico puede recetar medicamentos para calmarlo.      •Dolor o sensación de inquietud en las piernas. Puede sentir que las piernas patean. [place] a veces causa problemas para dormir.        Siga estas instrucciones en hahn casa:      Comida y bebida      •Siga las instrucciones del médico respecto de las restricciones en las comidas o las bebidas.      •Hable con hahn médico acerca de cuánto líquido puede nilson. Lleve un registro de la cantidad de líquido que arvin de modo de no beber más de lo permitido. [place] es importante. El líquido puede acumularse en hahn cuerpo entre las sesiones de diálisis. La acumulación de líquido afecta la presión arterial y puede hacer que el corazón trabaje más.    •Limitar el consumo de sal (sodio). [place] es importante por lo siguiente:  •El sodio provoca sed. [place] es un problema, porque la cantidad de líquido que puede beber por día es limitada.      •El sodio afecta los niveles de presión arterial. Controlar la cantidad de sodio en hahn dieta puede ayudarlo a controlar los niveles de presión arterial.        •Limite el consumo de potasio. Los niveles de potasio pueden aumentar lo las sesiones de diálisis. Los niveles altos pueden causar un ritmo cardíaco peligroso, lo que puede poner en riesgo hahn abhi.      •Limite el consumo de fósforo. El consumo excesivo de fósforo puede hacer que los huesos pierdan calcio. [place] los debilita y favorece las fracturas. Consumir demasiado fósforo también puede causar picazón en la piel.      •Consuma proteínas de uche calidad que daquan bajas en fósforo. Las proteínas de uche calidad provienen del pescado, las aves, los huevos, los frijoles y las lentejas.      Cuidado del lugar de acceso vascular     •Evite dormir sobre el lugar donde la shawn se extrae y retorna al cuerpo (lugar de acceso vascular).    •Si el lugar de acceso vascular está en el brazo:  •No levante objetos pesados con marci brazo.      •No use prendas apretadas sobre marci brazo.      •No deje que le tomen la presión arterial en marci brazo.      •No permita que lo pinchen con agujas en marci brazo, ni siquiera para sacarle shawn.        •Si tiene colocado un tubo kat y ace (catéter), no lo ulises entre las sesiones de diálisis.    •Si tiene ange fístula o un injerto:  •Siga las instrucciones del médico acerca del cuidado de la fístula o del injerto. Lávelo con jabón todos los días y antes de la sesión de diálisis.    •Asegúrese de hacer lo siguiente:  •Lávese las princess con agua y jabón lo al menos 20 segundos antes y después de cambiarse la venda (vendaje). Use desinfectante para princess si no dispone de agua y jabón.      •Cambie los vendajes shanna se lo haya indicado el médico.        •Sienta si hay vibraciones en el lugar de acceso (frémito) todos los días. Estas vibraciones significan que el acceso está funcionando.    •Revise el lugar de acceso vascular todos los días para detectar signos de infección. Esté atento a los siguientes signos:  •Enrojecimiento, hinchazón o dolor.      •Líquido o shawn.       •Calor.      •Pus o mal olor.           Actividad     •Laila reposo shanna se lo haya indicado el médico.      •Retome oscar actividades normales según lo indicado por el médico. Pregúntele al médico qué actividades son seguras para usted.      Control del estreñimiento   Hahn afección puede causar estreñimiento. Para prevenir o tratar el estreñimiento, es posible que deba hacer lo siguiente:  •Usar medicamentos recetados o de venta barbara.      •Consumir alimentos ricos en fibra, shanna frijoles, cereales integrales, y frutas y verduras frescas.      •Limitar el consumo de alimentos ricos en grasa y azúcares procesados, shanna los alimentos fritos o dulces.      Consumo de alcohol  •No ben alcohol si:  •Hahn médico le indica no hacerlo.       •Está embarazada, puede estar embarazada o está tratando de quedar embarazada.      •Si arvin alcohol:•Limite la cantidad que arvin:  •De 0 a 1 medida por día para las mujeres.       •De 0 a 2 medidas por día para los hombres.        •Esté atento a la cantidad de alcohol que hay en las bebidas que tanesha. En los [place], ange medida equivale a ange botella de cerveza de 12 oz (355 ml), un vaso de vino de 5 oz (148 ml) o un vaso de ange bebida alcohólica de uche graduación de 1½ oz (44 ml).          Instrucciones generales    •Use los medicamentos de venta barbara y los recetados solamente shanna se lo haya indicado el médico. [place] incluye suplementos de vitaminas y minerales. Hable con hahn médico antes de nilson nuevos suplementos.      •No tome emelyn de inmersión, no nade ni use el jacuzzi hasta que el médico la autorice. Pregúntele al médico si puede ducharse. Patricio vez solo le permitan darse emelyn de esponja.      •Lleve consigo ange tarjeta en la billetera, un brazalete o ange etiqueta de identificación médica que muestre que recibe diálisis. Lleve siempre consigo la tarjeta, el brazalete o la etiqueta. Si sufre un accidente o tiene ange emergencia médica y no puede comunicarse, shaniqua elemento le hará saber a los médicos sobre hahn afección.      •Concurra a todas las visitas de diálisis según lo indicado por hahn médico. Las citas de diálisis deben programarse regularmente. No falte a ninguna.        Comuníquese con un médico si:    •Tiene fiebre.      •Tiene escalofríos.      •El lugar de acceso vascular está caliente al tacto.      •Tiene un grano en el lugar de acceso vascular.      •Presenta enrojecimiento, hinchazón o dolor alrededor del lugar de acceso vascular.      •Le sale shawn o líquido del lugar de acceso vascular.      •Tiene pus o percibe mal olor que proviene del lugar de acceso vascular.        Busque ayuda de inmediato si:  •Tiene síntomas en la fístula o el injerto, por ejemplo:   •La mano del lado del cuerpo que tiene la fístula le duele, está adormecida o se pone muy pálida.      •No siente un frémito si tiene ange fístula o un injerto.        •Siente mareos o debilidad que no ha tenido antes.      •Siente falta de aire o dolor en el pecho.      •Tiene sangrado en el lugar de acceso vascular que no puede controlarse fácilmente.      •Está confundido o no sabe qué momento del día es, dónde está ni quién es (está desorientado).      •Tiene visión borrosa.      •Tiene convulsiones.      Estos síntomas pueden representar un problema grave que constituye ange emergencia. No espere a alex si los síntomas desaparecen. Solicite atención médica de inmediato. Comuníquese con el servicio de emergencias de hahn localidad (911 en los [place]). No conduzca por oscar propios medios hasta el hospital.       Resumen    •Después de la hemodiálisis, es frecuente tener cansancio (fatiga), picazón y dolor o sensación de inquietud en las piernas.      •Registre la cantidad de líquido que tanesha, limite el consumo de ciertos minerales, coma proteínas de uche calidad y tome suplementos de vitaminas y minerales según las indicaciones de hahn médico.      •Cuide de manera apropiada hahn acceso vascular y comuníquese con hahn médico si tiene problemas con hahn acceso vascular tales shanna sensación de calor, enrojecimiento, hinchazón, secreción o sangrado en el lugar.      Esta información no tiene shanna fin reemplazar el consejo del médico. Asegúrese de hacerle al médico cualquier pregunta que tenga.

## 2021-06-16 NOTE — END OF VISIT
POSTPARTUM DISCHARGE PRECAUTIONS and Answers to FAQs     NO SEX for SIX weeks.     NO TUB BATH or POOL for 1 week(s), shower only.      VAGINAL BLEEDING:  may continue on and off over the next several weeks after delivery and may increase slightly once you go home.  You should not be bleeding more than 1 large pad soaked every hour or two.  Clots (even the size of a lemon or larger) may be normal as long as the bleeding is not heavy and the clots do not continue.       FEVER or CHILLS or NOT FEELING WELL: call our office.  If the office is closed, you need to be seen in acute care or ER.       CHEST PAIN or SHORTNESS OF BREATH/AIR: you need to GO TO THE NEAREST ER or CALL 911.      SWELLING: can increase over the next 7-10 days and then should slowly improve.  Your legs/ankles should be fairly similar in size.  A red, painful, hot, swollen leg (usually just one side) can be a sign of a blood clot and should be evaluated immediately.  Call our office.  If it is after hours or a weekend, you must be seen IMMEDIATELY IN THE ER.      WORK and SCHOOL TIME OFF: depends on your specific delivery type, surrounding circumstances, and your work insurance/school rules.  If you have questions, please call Greer or Joie at [phone] (ext. [phone]).  Or email Greer at [email or web address].  They will assist in required paperwork for you and/or family members.      Any further QUESTIONS or CONCERNS, please call [facility] at [phone].       [>50% of Time Spent on Counseling and Coordination of Care for  ___] : Greater than 50% of the encounter time was spent on counseling and coordination of care for [unfilled] [Time Spent: ___ minutes] : I have spent [unfilled] minutes of face to face time with the patient

## 2021-06-16 NOTE — ED ADULT NURSE REASSESSMENT NOTE - NS ED NURSE REASSESS COMMENT FT1
Assumed care of pt. Pt requesting to leave AMA. AMA paperwork given to pt. Pt verbalized understanding of what AMA means. MD Contino aware. Daughter at bedside.

## 2021-06-16 NOTE — ED ADULT NURSE NOTE - OBJECTIVE STATEMENT
Per pt's daughter the pt's aide noted after the pt's used the bathroom the toilet water was red. shruti Linton nephrologist stated pt's Hgb was low last week and sent to ED for blood test and dialysis here. Pt's stool was red. Pt goes dialysis M/W/F

## 2021-06-16 NOTE — ED ADULT TRIAGE NOTE - CHIEF COMPLAINT QUOTE
pt sent to ED by Md Santo for dialysis due to rectal bleeding since this AM pt has a right arm fistula receives dialysis M/W/F

## 2021-06-16 NOTE — ED PROVIDER NOTE - PATIENT PORTAL LINK FT
You can access the FollowMyHealth Patient Portal offered by [facility] by registering at the following website: [email or web address]. By joining [facility]’s FollowMyHealth portal, you will also be able to view your health information using other applications (apps) compatible with our system.

## 2021-06-16 NOTE — CONSULT NOTE ADULT - SUBJECTIVE AND OBJECTIVE BOX
NEPHROLOGY CONSULT  HPI:85 yo on HD ESRD, MWF, was told to come to  ED for blood in stool.  pt did not get hd today will be dialyzed in ED due to airborn pecautions      PAST MEDICAL & SURGICAL HISTORY:  HTN (hypertension)    DM (diabetes mellitus)    Hypercholesteremia    Hypothyroid    Cutaneous T-cell lymphoma    BPH (benign prostatic hyperplasia)    ESRD on dialysis    SANG (obstructive sleep apnea)    Pleural effusion on left    Squamous cell carcinoma of skin    Aortic valve stenosis    CHF (congestive heart failure)    LBBB (left bundle branch block)    Gout    OAB (overactive bladder)    CAD (coronary artery disease)    TIA (transient ischemic attack)    Afib    H/O hernia repair    S/P TAVR (transcatheter aortic valve replacement)    S/P coronary artery stent placement    H/O eye surgery        FAMILY HISTORY:  No pertinent family history in first degree relatives        MEDICATIONS  (STANDING):    MEDICATIONS  (PRN):      Allergies    No Known Allergies    Intolerances        I&O's Summary        REVIEW OF SYSTEMS:    CONSTITUTIONAL:  As per HPI.  CONSTITUTIONAL: No weakness, fevers or chills  EYES/ENT: No visual changes;  No vertigo or throat pain   NECK: No pain or stiffness  CARDIOVASCULAR: No chest pain or palpitations  GASTROINTESTINAL: No abdominal or epigastric pain. No nausea, vomiting, or hematemesis; No diarrhea or constipation. No melena or hematochezia.  GENITOURINARY: No dysuria, frequency or hematuria  NEUROLOGICAL: No numbness or weakness  SKIN: No itching, burning, rashes, or lesions   All other review of systems is negative unless indicated above      Vital Signs Last 24 Hrs  T(C): 36.4 (16 Jun 2021 15:58), Max: 36.4 (16 Jun 2021 15:58)  T(F): 97.5 (16 Jun 2021 15:58), Max: 97.5 (16 Jun 2021 15:58)  HR: 65 (16 Jun 2021 15:58) (65 - 65)  BP: 132/48 (16 Jun 2021 15:58) (132/48 - 132/48)  BP(mean): 69 (16 Jun 2021 15:58) (69 - 69)  RR: 17 (16 Jun 2021 15:58) (17 - 17)  SpO2: 100% (16 Jun 2021 15:58) (100% - 100%)  Daily     Daily   I&O's Summary      PHYSICAL EXAM:    General:  Alert, well-developed ,No acute distress.    Neuro:  Alert and oriented to person, place, and time. Able to communicate  well. Cranial nerves 2-12 grossly intact. 5/5 strength in all  extremities bilaterally. Sensation intact in all extremities.  Appropriate affect.     HEENT:  No JVD, no masses, Eyes anicteric, No carotid bruits.No lymphadenopathy,    Cardiovascular:  Regular rate and rhythm, with normal S1 and S2. No murmurs, rubs,  or gallops. No JVD.     Lungs:  clear. no rales, no wheezing, .    Abdomen:  Normoactive bowel sounds. Soft, flat, non-tender, and non-distended.  No hepatosplenomegaly, positive bowel sounds    Skin:  Warm, dry, well-perfused. No rashes or other lesions.     Extremities:  2+ pulses in upper and lower extremities. No lower extremity pain or  edema; legs are symmetric in appearance.    LABS:                   NEPHROLOGY CONSULT  HPI:87 yo on HD ESRD, MWF, was told to come to  ED for blood in stool.  pt did not get hd today will be dialyzed in ED due to airborn precautions  Pts daughter with him, had [place] change yesterday, while changing his clothes noted red blood with stool.  Has h/o Gastric AVM, A Fib, taken off A/C except for asa  not symptomatic, daughter states refusing to stay due to his recent hospital stay at  [facility] for gallstones/ sepsis/ cholecystectomy        PAST MEDICAL & SURGICAL HISTORY:  HTN (hypertension)    DM (diabetes mellitus)    Hypercholesteremia    Hypothyroid    Cutaneous T-cell lymphoma    BPH (benign prostatic hyperplasia)    ESRD on dialysis    SANG (obstructive sleep apnea)    Pleural effusion on left    Squamous cell carcinoma of skin    Aortic valve stenosis    CHF (congestive heart failure)    LBBB (left bundle branch block)    Gout    OAB (overactive bladder)    CAD (coronary artery disease)    TIA (transient ischemic attack)    Afib    H/O hernia repair    S/P TAVR (transcatheter aortic valve replacement)    S/P coronary artery stent placement    H/O eye surgery        FAMILY HISTORY:  No pertinent family history in first degree relatives        MEDICATIONS  (STANDING):    MEDICATIONS  (PRN):      Allergies    No Known Allergies    Intolerances        I&O's Summary        REVIEW OF SYSTEMS:    CONSTITUTIONAL:  As per HPI.  CONSTITUTIONAL: No weakness, fevers or chills  EYES/ENT: No visual changes;  No vertigo or throat pain   NECK: No pain or stiffness  CARDIOVASCULAR: No chest pain or palpitations  GASTROINTESTINAL: No abdominal or epigastric pain. No nausea, vomiting, or hematemesis; No diarrhea or constipation. No melena or hematochezia.  GENITOURINARY: No dysuria, frequency or hematuria  NEUROLOGICAL: No numbness or weakness  SKIN: No itching, burning, rashes, or lesions   All other review of systems is negative unless indicated above      Vital Signs Last 24 Hrs  T(C): 36.4 (16 Jun 2021 15:58), Max: 36.4 (16 Jun 2021 15:58)  T(F): 97.5 (16 Jun 2021 15:58), Max: 97.5 (16 Jun 2021 15:58)  HR: 65 (16 Jun 2021 15:58) (65 - 65)  BP: 132/48 (16 Jun 2021 15:58) (132/48 - 132/48)  BP(mean): 69 (16 Jun 2021 15:58) (69 - 69)  RR: 17 (16 Jun 2021 15:58) (17 - 17)  SpO2: 100% (16 Jun 2021 15:58) (100% - 100%)  Daily     Daily   I&O's Summary      PHYSICAL EXAM:    General:  Alert, well-developed ,No acute distress.    Neuro:  Alert and oriented to person, place, and time. Able to communicate  well. Cranial nerves 2-12 grossly intact.Appropriate affect.     HEENT:  No JVD, no masses, Eyes anicteric, No carotid bruits.No lymphadenopathy,    Cardiovascular:  Regular rate and rhythm, with normal S1 and S2. No murmurs, rubs,  or gallops. No JVD.     Lungs:  clear. no rales, no wheezing, .    Abdomen:  Normoactive bowel sounds. Soft, flat, non-tender, and non-distended.  No hepatosplenomegaly, positive bowel sounds    Skin:  Warm, dry, well-perfused. No rashes or other lesions.     Extremities:  no edema  LABS:    cbc pending

## 2021-06-16 NOTE — ED PROVIDER NOTE - CARE PLAN
Principal Discharge DX:	GI bleed  Secondary Diagnosis:	ESRD on dialysis  Secondary Diagnosis:	Anemia due to blood loss

## 2021-06-16 NOTE — ED PROVIDER NOTE - PROGRESS NOTE DETAILS
Pt with hx dm, esrd on dialysis, gastric avm, recenta dmission to [facility] for sepsis, pt of smith Powell in Dec, who was found to be anemic by his doctor. Pt had appt with urologist today and had vegas place. C/o having two bloody bowel movements today. pt initially told Dr. Madrid that he does not want to be admitted--is due for dialysis and Dr. Madrid startes will transfuse during diallysis tody if he requires it. Pt has no complaints of cp or dizzyness. Will send to min for further eval. Fidelia ESTRADA

## 2021-06-16 NOTE — ED PROVIDER NOTE - MUSCULOSKELETAL, MLM
Spine appears normal, range of motion is not limited, no muscle or joint tenderness. BENTLEY x4, no focal swelling or tenderness +AVG right upper arm, dialysis in progress without complication, 1+ edema bilateral pretibial non tender

## 2021-06-16 NOTE — ED PROVIDER NOTE - OBJECTIVE STATEMENT
85 y/o male with hx dm, esrd on dialysis, gastric avm, recent admission to [facility] for sepsis, pt of smith Powell in Dec, who was found to be anemic by his doctor. Pt had appointment with urologist today and had Rosario placed. Pt c/o having two bloody bowel movements today. Pt initially told Dr. Madrid that he does not want to be admitted--is due for dialysis and Dr. Madrid states he will transfuse during dialysis today if he requires it. Pt has no complaints of cp or dizziness.

## 2021-06-17 LAB
HAV IGM SER-ACNC: SIGNIFICANT CHANGE UP
HBV CORE IGM SER-ACNC: SIGNIFICANT CHANGE UP
HBV SURFACE AG SER-ACNC: SIGNIFICANT CHANGE UP
HCV AB S/CO SERPL IA: 0.27 S/CO — SIGNIFICANT CHANGE UP (ref 0–0.99)
HCV AB SERPL-IMP: SIGNIFICANT CHANGE UP

## 2021-07-24 ENCOUNTER — OUTPATIENT (OUTPATIENT)
Dept: OUTPATIENT SERVICES | Facility: HOSPITAL | Age: 86
LOS: 1 days | Discharge: HOME | End: 2021-07-24

## 2021-07-24 DIAGNOSIS — Z98.89 OTHER SPECIFIED POSTPROCEDURAL STATES: Chronic | ICD-10-CM

## 2021-07-24 DIAGNOSIS — Z98.890 OTHER SPECIFIED POSTPROCEDURAL STATES: Chronic | ICD-10-CM

## 2021-07-24 DIAGNOSIS — Z95.5 PRESENCE OF CORONARY ANGIOPLASTY IMPLANT AND GRAFT: Chronic | ICD-10-CM

## 2021-07-24 DIAGNOSIS — C85.90 NON-HODGKIN LYMPHOMA, UNSPECIFIED, UNSPECIFIED SITE: ICD-10-CM

## 2021-07-24 DIAGNOSIS — Z95.2 PRESENCE OF PROSTHETIC HEART VALVE: Chronic | ICD-10-CM

## 2021-07-27 ENCOUNTER — APPOINTMENT (OUTPATIENT)
Dept: HEMATOLOGY ONCOLOGY | Facility: CLINIC | Age: 86
End: 2021-07-27
Payer: MEDICARE

## 2021-07-27 VITALS
HEART RATE: 78 BPM | RESPIRATION RATE: 17 BRPM | SYSTOLIC BLOOD PRESSURE: 119 MMHG | OXYGEN SATURATION: 99 % | DIASTOLIC BLOOD PRESSURE: 75 MMHG | TEMPERATURE: 97.8 F | HEIGHT: 65 IN | BODY MASS INDEX: 28.03 KG/M2 | WEIGHT: 168.21 LBS

## 2021-07-27 PROCEDURE — 99215 OFFICE O/P EST HI 40 MIN: CPT

## 2021-07-27 RX ORDER — CLOPIDOGREL BISULFATE 75 MG/1
75 TABLET, FILM COATED ORAL
Qty: 30 | Refills: 0 | Status: DISCONTINUED | COMMUNITY
Start: 2019-08-02 | End: 2021-07-27

## 2021-07-27 RX ORDER — APIXABAN 2.5 MG/1
2.5 TABLET, FILM COATED ORAL
Refills: 0 | Status: DISCONTINUED | COMMUNITY
End: 2021-07-27

## 2021-07-27 RX ORDER — FAMOTIDINE 20 MG/1
20 TABLET, FILM COATED ORAL
Qty: 30 | Refills: 0 | Status: DISCONTINUED | COMMUNITY
Start: 2019-12-04 | End: 2021-07-27

## 2021-07-31 NOTE — HISTORY OF PRESENT ILLNESS
[Disease:__________________________] : Disease: [unfilled] [Cardiovascular] : Cardiovascular [Constitutional] : Constitutional [ENT] : ENT [Dermatologic] : Dermatologic [Endocrine] : Endocrine [Gastrointestinal] : Gastrointestinal [Genitourinary] : Genitourinary [Gynecologic] : Gynecologic [Infectious] : Infectious [Musculoskeletal] : Musculoskeletal [Neurologic] : Neurologic [Pain] : Pain [Pulmonary] : Pulmonary [Hematologic] : Hematologic [Date: ____________] : Patient's last distress assessment performed on [unfilled]. [0 - No Distress] : Distress Level: 0 [de-identified] : First visit 04/28/2009; He had symptoms of a scaling rash over his extremities and his trunk for more than one year. He has a prior history of significant  type 2 diabetes mellitus, obesity, hypertension, hyperlipidemia and hypercholesterolemia. The patient is monitoring the cholesterol and the lip levels. 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 5 X 5 X 1 mm. \par dermpath diagnostics:  21- [id number]\par Increasing episodes of disorientation related to cognitive disfunction; hospitalized twice in past six months. gall stones removed in May by ERCP and stent palced\par He has had vaccination against C o V 2 April May 2021\par Dialysis three times weekly

## 2021-07-31 NOTE — ASSESSMENT
[Palliative Care Plan] : Patient was apprised of incurable nature of disease.  Hospice and Palliative care options discussed. 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 He requires topical treatment with steroid cream. Squamous cell carcinoma of skin .\par Prior skin lesions include T cell lymphoma in 2009; squamous cell carcinoma in 2017; basal cell carcinoma 2018.\par New scalp lesion on vertex. Discussion with dermatology office representative regarding Moh's surgery.\par I have given family the name of Dr LEI Og for consideration of Moh's procedure.\par RTC 6 months. [FreeTextEntry2] : Has been discussed with nephologist

## 2021-07-31 NOTE — PHYSICAL EXAM
[Capable of only limited self care, confined to bed or chair more than 50% of waking hours] : Status 3- Capable of only limited self care, confined to bed or chair more than 50% of waking hours [Normal] : affect appropriate [de-identified] : ecchymosis on the right arm [de-identified] : no lesions of the trunk; mild erythema of the posterior thighs similar to prior exmiantion. Left scalp lesion small puntate site post biopsy

## 2021-08-24 ENCOUNTER — APPOINTMENT (OUTPATIENT)
Dept: NEUROLOGY | Facility: CLINIC | Age: 86
End: 2021-08-24
Payer: MEDICARE

## 2021-08-24 VITALS
TEMPERATURE: 97.6 F | HEIGHT: 65 IN | HEART RATE: 76 BPM | SYSTOLIC BLOOD PRESSURE: 134 MMHG | BODY MASS INDEX: 29.16 KG/M2 | OXYGEN SATURATION: 97 % | DIASTOLIC BLOOD PRESSURE: 67 MMHG | WEIGHT: 175 LBS

## 2021-08-24 DIAGNOSIS — R29.810 FACIAL WEAKNESS: ICD-10-CM

## 2021-08-24 PROCEDURE — 99215 OFFICE O/P EST HI 40 MIN: CPT

## 2021-08-24 NOTE — HISTORY OF PRESENT ILLNESS
[FreeTextEntry1] : Patient is accompanied by his son who aided in giving history. He states since last visit, patient had a GI bleed in May, followed by shingles last month. He had worsening confusion last week, which has now improved. Patient's son states patient is now back to his new baseline. He is off of eliquis due to GI bleed. He states patient holds regular conversation regarding past events. He needs help with almost all ADLs. His tremor has improved. He states patient is taking aspirin 81mg daily. He has no further complaints.

## 2021-08-24 NOTE — ASSESSMENT
[FreeTextEntry1] : 85 yo man with decline in memory initially due to noncirrhotic hyperammonemia. Patient also has an overlying dementia. Given mild left sided weakness will obtain open MRI brain w/o contrast to r/o infarct\par \par Son was advised to continue to supervise patient at all times\par recommend GI follow up for ammonia level\par open MRI brain w/o contrast - will have staff expedite authorization for this\par Son was advised to bring patient to ED for any new or worsening focal deficits\par Son was advised to notify me for worsening symptoms\par F/u in 1 month or sooner if necessary

## 2021-08-24 NOTE — PHYSICAL EXAM
[FreeTextEntry1] : Mental Status: awake, alert, oriented to person and place, states month is December, no dysarthria,, communicating appropriately\par CN: PERRL, EOMI, VFF, V1-V3 sensation intact, hearing grossly intact, decreased nasolabial fold on left, tongue midline\par Motor: \par LUE 4+/5\par LLE 4/5\par RLE 5/5\par RUE 5/5\par Sensory: intact to light touch throughout\par Reflexes: 1+ throughout, toes equivocal bilaterally\par Coordination: no dysmetria on FTN\par Gait: steady with walker\par \par  [General Appearance - Alert] : alert [General Appearance - In No Acute Distress] : in no acute distress [Sclera] : the sclera and conjunctiva were normal [Neck Appearance] : the appearance of the neck was normal

## 2021-10-25 ENCOUNTER — APPOINTMENT (OUTPATIENT)
Dept: NEUROLOGY | Facility: CLINIC | Age: 86
End: 2021-10-25
Payer: MEDICARE

## 2021-10-25 VITALS
BODY MASS INDEX: 28.32 KG/M2 | HEART RATE: 84 BPM | DIASTOLIC BLOOD PRESSURE: 71 MMHG | SYSTOLIC BLOOD PRESSURE: 143 MMHG | OXYGEN SATURATION: 95 % | HEIGHT: 65 IN | WEIGHT: 170 LBS | TEMPERATURE: 97.8 F

## 2021-10-25 DIAGNOSIS — R41.3 OTHER AMNESIA: ICD-10-CM

## 2021-10-25 PROCEDURE — 99214 OFFICE O/P EST MOD 30 MIN: CPT

## 2021-10-25 NOTE — HISTORY OF PRESENT ILLNESS
[FreeTextEntry1] : Since last visit, patient had a UTI which made him more confused. He is beginning to improve from UTI. He saw his gastroenterologist since last visit, son is unsure of most recent ammonia level. He states patient is still taking lactulose. He feels he has had a significant slow decline in mental status since January 2021. 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 As per radiology, this may represent a benign bone island unless there is a history of cancer, in which case metastasis or myeloma is within the differential. As per son at bedside, patient was found to have a mass on last colonoscopy, however, due to multiple medical comorbidities, decision was made not to proceed with a repeat colonoscopy.

## 2021-10-25 NOTE — ASSESSMENT
[FreeTextEntry1] : 85 yo man with memory problems and tremor initally 2/2 hyperammonemia (without evidence of liver disease), however, given persisten memory problems despite treatment with lactulose there is likely an overlying dementia component. \par \par Memory problem\par Referral to Dr. Rene for further evaluation\par Son was advised to continue 24 hr supervision for patient for safety concerns\par \par MRI brain - benign bone island vs malignancy\par A copy of report was printed out for patient's son and he was advised to confer with patient's oncologist regarding further management\par \par Son was advisd to notify me for worsening symptoms and was advised to follow up with me as needed pending eval with Dr. Rene\par

## 2021-10-25 NOTE — PHYSICAL EXAM
[General Appearance - Alert] : alert [General Appearance - In No Acute Distress] : in no acute distress [Sclera] : the sclera and conjunctiva were normal [PERRL With Normal Accommodation] : pupils were equal in size, round, reactive to light, with normal accommodation [Outer Ear] : the ears and nose were normal in appearance [Neck Appearance] : the appearance of the neck was normal [Skin Color & Pigmentation] : normal skin color and pigmentation [FreeTextEntry1] : Mental Status: awake, alert, oriented to person only, states he is at [facility] and that the month is July, no dysarthria, no aphasia, follows simple commands\par CN: PERRL, EOMI, VFF, V1-V3 sensation intact, hearing grossly intact, no facial asymmetry, tongue midline\par Motor: 5/5 x 4 extremities\par Sensory: intact to light touch throughout\par Reflexes: 1+ throughout, toes equivocal bilaterally\par Coordination: no dysmetria on FTN\par Gait: walks with a walker\par \par

## 2021-11-23 ENCOUNTER — NON-APPOINTMENT (OUTPATIENT)
Age: 86
End: 2021-11-23

## 2022-01-01 ENCOUNTER — APPOINTMENT (OUTPATIENT)
Dept: PULMONOLOGY | Facility: CLINIC | Age: 87
End: 2022-01-01

## 2022-01-01 ENCOUNTER — NON-APPOINTMENT (OUTPATIENT)
Age: 87
End: 2022-01-01

## 2022-01-01 VITALS
OXYGEN SATURATION: 98 % | HEART RATE: 84 BPM | HEIGHT: 65 IN | WEIGHT: 75 LBS | BODY MASS INDEX: 12.5 KG/M2 | RESPIRATION RATE: 16 BRPM | SYSTOLIC BLOOD PRESSURE: 116 MMHG | DIASTOLIC BLOOD PRESSURE: 62 MMHG

## 2022-01-01 VITALS
DIASTOLIC BLOOD PRESSURE: 68 MMHG | SYSTOLIC BLOOD PRESSURE: 138 MMHG | WEIGHT: 175 LBS | HEIGHT: 65 IN | BODY MASS INDEX: 29.16 KG/M2 | HEART RATE: 98 BPM | OXYGEN SATURATION: 98 %

## 2022-01-01 DIAGNOSIS — G47.33 OBSTRUCTIVE SLEEP APNEA (ADULT) (PEDIATRIC): ICD-10-CM

## 2022-01-01 DIAGNOSIS — R06.09 OTHER FORMS OF DYSPNEA: ICD-10-CM

## 2022-01-01 DIAGNOSIS — C85.90 NON-HODGKIN LYMPHOMA, UNSPECIFIED, UNSPECIFIED SITE: ICD-10-CM

## 2022-01-01 PROCEDURE — 99215 OFFICE O/P EST HI 40 MIN: CPT

## 2022-01-01 PROCEDURE — 99204 OFFICE O/P NEW MOD 45 MIN: CPT

## 2022-01-01 RX ORDER — CHOLECALCIFEROL (VITAMIN D3) 25 MCG
25 MCG TABLET ORAL
Refills: 0 | Status: DISCONTINUED | COMMUNITY
End: 2022-01-01

## 2022-01-01 RX ORDER — DOCUSATE SODIUM 100 MG/1
100 CAPSULE ORAL TWICE DAILY
Refills: 0 | Status: DISCONTINUED | COMMUNITY
End: 2022-01-01

## 2022-01-20 ENCOUNTER — OUTPATIENT (OUTPATIENT)
Dept: OUTPATIENT SERVICES | Facility: HOSPITAL | Age: 87
LOS: 1 days | Discharge: HOME | End: 2022-01-20

## 2022-01-20 DIAGNOSIS — Z95.5 PRESENCE OF CORONARY ANGIOPLASTY IMPLANT AND GRAFT: Chronic | ICD-10-CM

## 2022-01-20 DIAGNOSIS — C85.90 NON-HODGKIN LYMPHOMA, UNSPECIFIED, UNSPECIFIED SITE: ICD-10-CM

## 2022-01-20 DIAGNOSIS — Z95.2 PRESENCE OF PROSTHETIC HEART VALVE: Chronic | ICD-10-CM

## 2022-01-20 DIAGNOSIS — Z98.89 OTHER SPECIFIED POSTPROCEDURAL STATES: Chronic | ICD-10-CM

## 2022-01-20 DIAGNOSIS — Z98.890 OTHER SPECIFIED POSTPROCEDURAL STATES: Chronic | ICD-10-CM

## 2022-01-24 PROBLEM — I50.9 CHF (NYHA CLASS II, ACC/AHA STAGE C): Status: ACTIVE | Noted: 2019-08-02

## 2022-01-24 PROBLEM — N18.5 CHRONIC RENAL FAILURE, STAGE 5: Status: ACTIVE | Noted: 2019-07-31

## 2022-01-24 PROBLEM — I44.7 LBBB (LEFT BUNDLE BRANCH BLOCK): Status: ACTIVE | Noted: 2018-03-07

## 2022-01-24 PROBLEM — C44.92 SQUAMOUS CELL CARCINOMA OF SKIN: Status: ACTIVE | Noted: 2018-02-09

## 2022-01-27 ENCOUNTER — APPOINTMENT (OUTPATIENT)
Dept: HEMATOLOGY ONCOLOGY | Facility: CLINIC | Age: 87
End: 2022-01-27
Payer: MEDICARE

## 2022-01-27 DIAGNOSIS — N18.5 CHRONIC KIDNEY DISEASE, STAGE 5: ICD-10-CM

## 2022-01-27 DIAGNOSIS — E11.9 TYPE 2 DIABETES MELLITUS W/OUT COMPLICATIONS: ICD-10-CM

## 2022-01-27 DIAGNOSIS — C44.92 SQUAMOUS CELL CARCINOMA OF SKIN, UNSPECIFIED: ICD-10-CM

## 2022-01-27 DIAGNOSIS — I44.7 LEFT BUNDLE-BRANCH BLOCK, UNSPECIFIED: ICD-10-CM

## 2022-01-27 DIAGNOSIS — I50.9 HEART FAILURE, UNSPECIFIED: ICD-10-CM

## 2022-01-27 PROCEDURE — 99215 OFFICE O/P EST HI 40 MIN: CPT | Mod: 95

## 2022-01-27 RX ORDER — METOPROLOL TARTRATE 25 MG/1
25 TABLET, FILM COATED ORAL
Qty: 180 | Refills: 0 | Status: ACTIVE | COMMUNITY
Start: 2022-01-27

## 2022-01-27 RX ORDER — TAMSULOSIN HYDROCHLORIDE 0.4 MG/1
0.4 CAPSULE ORAL
Refills: 0 | Status: DISCONTINUED | COMMUNITY
Start: 2019-12-04 | End: 2022-01-27

## 2022-01-27 NOTE — PHYSICAL EXAM
[Capable of only limited self care, confined to bed or chair more than 50% of waking hours] : Status 3- Capable of only limited self care, confined to bed or chair more than 50% of waking hours [Normal] : affect appropriate [de-identified] : as seen [de-identified] : as seen [de-identified] : as seen [de-identified] : as seen [de-identified] : as seen [de-identified] : as seen [de-identified] : as seen; right forearm AV fistula [de-identified] : as seen [de-identified] : as seen [de-identified] : as seen [de-identified] : right axilla erythroderma [de-identified] : confusion  tired limited motion using a walker [de-identified] : as seen

## 2022-01-27 NOTE — REVIEW OF SYSTEMS
[Fever] : no fever [Chills] : no chills [Eye Pain] : no eye pain [Dry Eyes] : no dryness of the eyes [Chest Pain] : no chest pain [Leg Claudication] : no intermittent leg claudication [Wheezing] : no wheezing [Cough] : no cough [Suicidal] : not suicidal [Insomnia] : no insomnia [Proptosis] : no proptosis [Hot Flashes] : no hot flashes [de-identified] : diabetes

## 2022-01-27 NOTE — HISTORY OF PRESENT ILLNESS
[Home] : at home, [unfilled] , at the time of the visit. [Medical Office: ([facility])___] : at the medical office located in  [Family Member] : family member [Other:____] : [unfilled] [Verbal consent obtained from patient] : the patient, [unfilled] [FreeTextEntry3] : son Manjeet physician [de-identified] : First visit 04/28/2009; He had symptoms of a scaling rash over his extremities and his trunk for more than one year. He has a prior history of significant  type 2 diabetes mellitus, obesity, hypertension, hyperlipidemia and hypercholesterolemia. The patient is monitoring the cholesterol and the lip levels. 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 5 X 5 X 1 mm. \par dermpath diagnostics:  21- [id number]\par Increasing episodes of disorientation related to cognitive disfunction; hospitalized twice in past six months. gall stones removed in May by ERCP and stent palced\par He has had vaccination against C o V 2 April May 2021\par Dialysis three times weekly\par Removal of gall bladder in 2021 and progressive decrease in ambulation and memory; anemia noted\par new colon lesion seen on virtual Colonoscopy-Findings has decided on observation and palliation if it were a colon cancer.\par He has worsening cognitive dysfunction\par transfused once during dialysis

## 2022-01-27 NOTE — ASSESSMENT
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 He requires topical treatment with steroid cream. Squamous cell carcinoma of skin .\par Prior skin lesions include T cell lymphoma in 2009; squamous cell carcinoma in 2017; basal cell carcinoma 2018.\par New scalp lesion on vertex. Patient had resection.\par Skin T cell lymphoma has improved with topical steroid.\par Concern about colon lesion seen on virtual colonoscopy negative CT. Family has planned for no surgery and palliation\par renewal of triamcinolone dosing 0.1 and 0.5 % topical\par RTC 6 months. [FreeTextEntry2] : supportive careand dialysisi at home

## 2022-03-30 NOTE — PROGRESS NOTE ADULT - SUBJECTIVE AND OBJECTIVE BOX
c/c: GI bleed    HPI: The patient is an 84 yo male with Hx. of ESRD on hemodialysis , Atrial fib on Eliquis s/p watchman,  CAD s/p cardiac stents X2 in july 2019, TAVR december 2019, HTN, DMII, hypothyroid, cutaneous T cell lymphoma, SANG, TIA, Diastolic CHF, BPH, Gout, who was sent to [facility]. with anemia, GI bleed. He noted blood in the stool intermittently for the past 2 weeks. He could not get a doctors appointment.   Hg in the ED was 5.9 and he was given 3 units PRBCS via HD.   He was admitted with Upper GI bleed. HE was kept npo. A/C and asa was held. 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 He also was noted to have an esophageal stricture which was dilated.     5/14: pt seen and examined this am. Felt well. States he's been out of bed with walker. no further bleeding. Still on liquid diet.     ROS: all 10 systems reviewed and is as above otherwise negative.     Vital Signs Last 24 Hrs  T(C): 36.7 (14 May 2020 11:04), Max: 37.1 (13 May 2020 16:36)  T(F): 98 (14 May 2020 11:04), Max: 98.8 (13 May 2020 16:36)  HR: 61 (14 May 2020 13:47) (60 - 85)  BP: 103/49 (14 May 2020 13:47) (94/38 - 120/52)  RR: 17 (14 May 2020 11:04) (16 - 17)  SpO2: 100% (14 May 2020 11:04) (99% - 100%)      PHYSICAL EXAM:  GENERAL: NAD, undergoing HD   EYES: EOMI, PERRLA, normal sclera  ENT: Moist mucous membranes  NECK: Supple, No JVD, no nuchal rigidity  CHEST/LUNG: Clear to auscultation bilaterally; RT CHEST WALL CATHETER   HEART: Regular rate and rhythm; No murmurs, rubs, or gallops  ABDOMEN: Bowel sounds present; Soft, Nontender, Nondistended. No hepatomegaly  EXTREMITIES:  no pitting bilaterally  NERVOUS SYSTEM:  Alert & Oriented X3, speech clear. No focal motor or sensory deficits  MSK: FROM all 4 extremities, full and equal strength    LABS:                        9.4    6.10  )-----------( 112      ( 14 May 2020 09:08 )             29.4     05-14    134<L>  |  99  |  47<H>  ----------------------------<  164<H>  4.6   |  27  |  4.75<H>    Ca    8.5      14 May 2020 09:08  Phos  3.9     05-14  Mg     2.1     05-14    TPro  5.6<L>  /  Alb  2.3<L>  /  TBili  0.6  /  DBili  x   /  AST  25  /  ALT  21  /  AlkPhos  62  05-13      MEDICATIONS  (STANDING):  apixaban 2.5 milliGRAM(s) Oral two times a day  carvedilol 12.5 milliGRAM(s) Oral every 12 hours  cholecalciferol 2000 Unit(s) Oral daily  dextrose 5%. 1000 milliLiter(s) (50 mL/Hr) IV Continuous <Continuous>  dextrose 50% Injectable 12.5 Gram(s) IV Push once  dextrose 50% Injectable 25 Gram(s) IV Push once  dextrose 50% Injectable 25 Gram(s) IV Push once  diltiazem    Tablet 30 milliGRAM(s) Oral three times a day  epoetin-shakila-epbx (RETACRIT) Injectable 03655 Unit(s) SubCutaneous every 7 days  finasteride 5 milliGRAM(s) Oral daily  insulin lispro (HumaLOG) corrective regimen sliding scale   SubCutaneous three times a day before meals  levothyroxine 75 MICROGram(s) Oral daily  multivitamin 1 Tablet(s) Oral daily  pantoprazole  Injectable 40 milliGRAM(s) IV Push two times a day  polyethylene glycol 3350 17 Gram(s) Oral daily  tamsulosin 0.4 milliGRAM(s) Oral at bedtime    MEDICATIONS  (PRN):  dextrose 40% Gel 15 Gram(s) Oral once PRN Blood Glucose LESS THAN 70 milliGRAM(s)/deciliter  glucagon  Injectable 1 milliGRAM(s) IntraMuscular once PRN Glucose LESS THAN 70 milligrams/deciliter  polyethylene glycol 3350 17 Gram(s) Oral daily PRN Constipation    ASSESSMENT AND PLAN:  85M, PMH as above a/w:    1. Acute blood loss anemia d/t upper GI bleed from gastric AVM:  -s/p EGD/cauterization  -s/p dilation of esophageal stricture.   -resume eliquis @ 2.5mg bid today.   -resume asa if tolerates eliquis without bleeding.   -h/h stable s/p multiple units prbcs.   -advance diet.   -change ppi to iv bid.    2. H/o Cad/Stents:  -resume asa if no bleeding with eliquis  -?statin    3.  A fib on eliquis, h/o TIA:  -cont ccb  -resume eliquis @ 2.5mg bid.   -risks/benefits d/w patient's family, they would like eliquis to be resumed.     4. ESRD:  -HD tomorrow.     5. Hypothyroid  -synthroid    6. DMII:  -ss    7. HTN:  -bb decreased d/t borderline bp    8. BPH:  -flomax/proscar    9. DIastolic CHF:  -stable.     10. Cutaneous T cell lymphoma  -outpt f/u    11. DMII:  -ss    12. DVT px    13. Dispo:  dc planning for tomorrow.   d/w daughter over telephone, RN. [Current] : current [Never] : never [TextBox_4] : Patient is a 40-year-old male with past medical history significant for hypertension currently on 3 different medications who is referred for pulmonary consult regarding possible obstructive sleep apnea.  The patient states that his wife complains of loud snoring and he actually has to sleep in another area of the house.  He complains of occasional headaches and nonrestorative sleep.  The patient is a .  He currently denies fevers chills chest pain weight loss or hemoptysis

## 2022-07-28 NOTE — PROVIDER CONTACT NOTE (CRITICAL VALUE NOTIFICATION) - PERSON GIVING RESULT:
Marylou from lab Thalidomide Pregnancy And Lactation Text: This medication is Pregnancy Category X and is absolutely contraindicated during pregnancy. It is unknown if it is excreted in breast milk.

## 2022-08-09 PROBLEM — G47.33 OBSTRUCTIVE SLEEP APNEA, ADULT: Status: ACTIVE | Noted: 2017-03-31

## 2022-08-09 PROBLEM — R06.09 DYSPNEA ON EXERTION: Status: ACTIVE | Noted: 2018-03-07

## 2022-08-09 NOTE — HISTORY OF PRESENT ILLNESS
[TextBox_4] : 87-year-old male with a history of coronary artery disease, coronary stent, TAVR,peripheral vascular disease, diabetes, dementia, renal failure on dialysis, T-cell lymphoma, hypertension, hypercholesterolemia seen today following an episode of chronic cough.  Patient underwent a chest x-ray on 6/23 and 7/21/2022 which is reviewed below.  His cough has been intermittent worse in the supine position associated with increased leg edema.  His daughter noted scant green sputum production.  He was seen at an urgent care center approximately 1 month ago and was given a course of empiric antibiotics.  Presently has no complaints of chest pains palpitations lightheadedness or dizziness.  He does ambulate poorly.  History is obtained through his daughter as an . [Obstructive Sleep Apnea] : obstructive sleep apnea [Awakes Unrefreshed] : awakes unrefreshed [Daytime Somnolence] : daytime somnolence [Fatigue] : fatigue [Snoring] : snoring [Witnessed Apneas] : no witnessed apneas [Lab] : lab [TextBox_100] : 6/10/2017 [TextBox_108] : 14.6 [TextBox_120] : Supine index 51.4 [TextBox_165] : Patient intolerant of CPAP and refuses therapy

## 2022-08-09 NOTE — DISCUSSION/SUMMARY
[FreeTextEntry1] : 87-year-old male with a history of dementia, diabetes, coronary artery disease, peripheral vascular disease, renal failure on dialysis, valvular heart disease status post TAVR seen today following recent upper respiratory tract infection.  Possibly sinus in origin versus atelectasis secondary to chronic bilateral pleural effusions most likely due to volume overload.

## 2022-08-09 NOTE — CONSULT LETTER
[Dear  ___] : Dear  [unfilled], [Consult Letter:] : I had the pleasure of evaluating your patient, [unfilled]. [Please see my note below.] : Please see my note below. [Consult Closing:] : Thank you very much for allowing me to participate in the care of this patient.  If you have any questions, please do not hesitate to contact me. [Sincerely,] : Sincerely, [DrSugar  ___] : Dr. SOLO

## 2022-08-09 NOTE — END OF VISIT
[FreeTextEntry3] : Films reviewed on PACS with daughter [Time Spent: ___ minutes] : I have spent [unfilled] minutes of time on the encounter.

## 2022-08-09 NOTE — REASON FOR VISIT
[Initial] : an initial visit [Shortness of Breath] : shortness of breath [Family Member] : family member [TextBox_44] : Pleural effusion [TextBox_13] : Richie

## 2022-08-09 NOTE — PHYSICAL EXAM
[No Acute Distress] : no acute distress [Normal Oropharynx] : normal oropharynx [Normal Appearance] : normal appearance [No Neck Mass] : no neck mass [Normal Rate/Rhythm] : normal rate/rhythm [Normal S1, S2] : normal s1, s2 [No Murmurs] : no murmurs [No Resp Distress] : no resp distress [Clear to Auscultation Bilaterally] : clear to auscultation bilaterally [No Abnormalities] : no abnormalities [Benign] : benign [Normal Gait] : normal gait [No Clubbing] : no clubbing [No Cyanosis] : no cyanosis [No Edema] : no edema [FROM] : FROM [Normal Color/ Pigmentation] : normal color/ pigmentation [No Focal Deficits] : no focal deficits [Oriented x3] : oriented x3 [Normal Affect] : normal affect [2+ Pitting] : 2+ pitting [TextBox_105] : AV fistula

## 2022-10-25 NOTE — REASON FOR VISIT
[Follow-Up] : a follow-up visit [Ad Hoc ] : provided by an ad hoc  [Interpreters_FullName] : Michael [Interpreters_Relationshiptopatient] : Son [TWNoteComboBox1] : Turkish

## 2022-10-25 NOTE — HISTORY OF PRESENT ILLNESS
[TextBox_4] : 87-year-old male with a history of chronic renal failure on hemodialysis 3 times a week, dementia, cutaneous T-cell lymphoma, aortic stenosis, paroxysmal atrial fibrillation, hyperlipidemia, hypercholesterolemia, type 2 diabetes and last seen with a complaint of mild cough as well as bilateral pleural effusions.  History also positive for coronary artery disease status post stent.  History also positive for moderate obstructive sleep apnea but the patient is intolerant of CPAP and refuses therapy.  His last visit 8/9/202022, he had been seen for complaints of cough which were felt to be either on the basis of chronic sinus disease versus upper respiratory tract infection.\par \par S/p PPP 2 wks ago.  Improved dyspnea and cough although patient continues to sleep in a recliner.  No complaints of chest pain wheezing or sputum.

## 2022-10-25 NOTE — PHYSICAL EXAM
[Normal Oropharynx] : normal oropharynx [No Acute Distress] : no acute distress [Normal Appearance] : normal appearance [No Neck Mass] : no neck mass [Normal Rate/Rhythm] : normal rate/rhythm [Normal S1, S2] : normal s1, s2 [No Murmurs] : no murmurs [No Resp Distress] : no resp distress [Clear to Auscultation Bilaterally] : clear to auscultation bilaterally [No Abnormalities] : no abnormalities [Benign] : benign [Normal Gait] : normal gait [No Clubbing] : no clubbing [No Cyanosis] : no cyanosis [No Edema] : no edema [FROM] : FROM [2+ Pitting] : 2+ pitting [No Focal Deficits] : no focal deficits [Normal Color/ Pigmentation] : normal color/ pigmentation [Oriented x3] : oriented x3 [Normal Affect] : normal affect [TextBox_105] : AV fistula

## 2022-10-25 NOTE — DISCUSSION/SUMMARY
[FreeTextEntry1] : 87-year-old male with a history of the above seen today in follow-up.  Patient has chronic atelectasis most likely on the basis of renal failure complicated by underlying heart disease with chronotropic insufficiency as well as valvular heart disease.  Intermittent cough remains due to upper airways disease possibly complicated by chronic atelectasis with ineffective cough.  A follow-up chest x-ray will be performed in 3 months and the patient's son has been instructed to encourage cough and deep breathing.

## 2022-10-25 NOTE — END OF VISIT
[FreeTextEntry3] :  used, PACS reviewed [Time Spent: ___ minutes] : I have spent [unfilled] minutes of time on the encounter.

## 2023-01-01 ENCOUNTER — APPOINTMENT (OUTPATIENT)
Dept: PULMONOLOGY | Facility: CLINIC | Age: 88
End: 2023-01-01
Payer: MEDICARE

## 2023-01-01 VITALS — DIASTOLIC BLOOD PRESSURE: 40 MMHG | RESPIRATION RATE: 16 BRPM | SYSTOLIC BLOOD PRESSURE: 102 MMHG

## 2023-01-01 DIAGNOSIS — J90 PLEURAL EFFUSION, NOT ELSEWHERE CLASSIFIED: ICD-10-CM

## 2023-01-01 PROCEDURE — 99214 OFFICE O/P EST MOD 30 MIN: CPT

## 2023-01-01 RX ORDER — LEVOFLOXACIN 500 MG/1
500 TABLET, FILM COATED ORAL
Refills: 0 | Status: ACTIVE | COMMUNITY

## 2023-02-28 PROBLEM — J90 PLEURAL EFFUSION, LEFT: Status: ACTIVE | Noted: 2017-07-12

## 2023-02-28 NOTE — REASON FOR VISIT
[Follow-Up] : a follow-up visit [Ad Hoc ] : provided by an ad hoc  [Interpreters_FullName] : Michael [Interpreters_Relationshiptopatient] : Son [TWNoteComboBox1] : [place]

## 2023-02-28 NOTE — HISTORY OF PRESENT ILLNESS
[TextBox_4] : 87-year-old male with history of chronic renal failure on hemodialysis 3 times a week, dementia, cutaneous T-cell lymphoma, aortic stenosis, atrial fibrillation, hyperlipidemia, hypercholesterolemia, type 2 diabetes, permanent pacemaker, coronary artery disease status post stent, untreated obstructive sleep apnea seen today for follow-up of complaints of cough.\par \par Patient was found to have chronic atelectasis and pleural effusion.  Repeat chest x-rays were ordered\par \par Currently patient denies any complaints of increased cough wheeze or sputum production.

## 2023-02-28 NOTE — DISCUSSION/SUMMARY
[FreeTextEntry1] : 87-year-old male with history as above seen today in follow-up.  No significant change in bilateral chronic pleural effusions most likely on the basis of underlying heart disease as well as renal insufficiency.  Patient is significantly deconditioned and complicated by the above has had an continues to have sustained reduction in exercise tolerance

## 2023-02-28 NOTE — PHYSICAL EXAM
[No Acute Distress] : no acute distress [Normal Oropharynx] : normal oropharynx [Normal Appearance] : normal appearance [No Neck Mass] : no neck mass [Normal Rate/Rhythm] : normal rate/rhythm [Normal S1, S2] : normal s1, s2 [No Murmurs] : no murmurs [No Resp Distress] : no resp distress [Clear to Auscultation Bilaterally] : clear to auscultation bilaterally [No Abnormalities] : no abnormalities [Benign] : benign [Normal Gait] : normal gait [No Clubbing] : no clubbing [No Cyanosis] : no cyanosis [No Edema] : no edema [FROM] : FROM [2+ Pitting] : 2+ pitting [Normal Color/ Pigmentation] : normal color/ pigmentation [No Focal Deficits] : no focal deficits [Oriented x3] : oriented x3 [Normal Affect] : normal affect [TextBox_105] : AV fistula

## 2023-04-18 NOTE — HEMODIALYSIS NURSING HISTORY - DATE OF LAST PROSTATE EXAM
Advance Care Planning     Advance Care Planning Activator (Inpatient)  Conversation Note      Date of ACP Conversation: 4/18/2023     Conversation Conducted with: Patient with Decision Making Capacity    ACP Activator: Bahamn Barrett RN        Health Care Decision Maker:     Current Designated Health Care Decision Maker:     Primary Decision Maker: Ferminissac Masood - [phone]    Secondary Decision Maker: Emerick Councilman - Child - [phone]    Click here to complete Healthcare Decision Makers including section of the Healthcare Decision Maker Relationship (ie \"Primary\")      Care Preferences    Ventilation: \"If you were in your present state of health and suddenly became very ill and were unable to breathe on your own, what would your preference be about the use of a ventilator (breathing machine) if it were available to you? \"      Would the patient desire the use of ventilator (breathing machine)?: yes    \"If your health worsens and it becomes clear that your chance of recovery is unlikely, what would your preference be about the use of a ventilator (breathing machine) if it were available to you? \"     Would the patient desire the use of ventilator (breathing machine)?: PT DID NOT ANSWER AT THE TIME      Resuscitation  \"CPR works best to restart the heart when there is a sudden event, like a heart attack, in someone who is otherwise healthy. Unfortunately, CPR does not typically restart the heart for people who have serious health conditions or who are very sick. \"    \"In the event your heart stopped as a result of an underlying serious health condition, would you want attempts to be made to restart your heart (answer \"yes\" for attempt to resuscitate) or would you prefer a natural death (answer \"no\" for do not attempt to resuscitate)? \" yes       [] Yes   [] No   Educated Patient / Yesenia Melendez regarding differences between Advance Directives and portable DNR orders.     Length of ACP Conversation 16-Aug-2016

## 2023-05-19 ENCOUNTER — NON-APPOINTMENT (OUTPATIENT)
Age: 88
End: 2023-05-19

## 2023-05-19 RX ORDER — VIT B COMP NO.3/FOLIC/C/BIOTIN 1 MG-60 MG
1 TABLET ORAL DAILY
Refills: 0 | Status: ACTIVE | COMMUNITY

## 2023-07-21 ENCOUNTER — APPOINTMENT (OUTPATIENT)
Dept: NEUROLOGY | Facility: CLINIC | Age: 88
End: 2023-07-21

## 2023-08-28 ENCOUNTER — APPOINTMENT (OUTPATIENT)
Dept: PULMONOLOGY | Facility: CLINIC | Age: 88
End: 2023-08-28

## 2023-10-25 NOTE — ED PROVIDER NOTE - CARDIAC, MLM
Normal rate, regular rhythm.  Heart sounds S1, S2.  No murmurs, rubs or gallops. normal pulses Use Complex Repair Preambles?: Yes

## 2024-02-12 NOTE — DISCHARGE NOTE PROVIDER - NSDCCONDITION_GEN_ALL_CORE
Ambulatory Care Coordination Note  2024    Patient Current Location:  [place]    ACM contacted the patient by telephone. Verified name and  with patient as identifiers. Provided introduction to self, and explanation of the ACM role.     ACM: Daphne Borges RN    Challenges to be reviewed by the provider   Additional needs identified to be addressed with provider: No  none               Method of communication with provider: none.    Call to pt for acm outreach. Reviewed info from pcp apptmt. Agreeable to outreach. Pt plans to scheduled f/u apptmt for ortho/shoulder surgery daughter but currently in surgery at this time and will need to help her as she recovers.Pt denies needs symptoms or barriers at this time. Advised to call with any needs. Reminded of same day apptmt availability.  Has acm contact info if needs arise. Will continue to follow, call ended at this time as pt awaiting daughter from surgery.      Offered patient enrollment in the Remote Patient Monitoring (RPM) program for in-home monitoring: Yes, but did not enroll at this time.    Ambulatory Care Coordination Assessment    Care Coordination Protocol  Referral from Primary Care Provider: No  Week 1 - Initial Assessment     Do you have all of your prescriptions and are they filled?: Yes  Barriers to medication adherence: None  Are you able to afford your medications?: Yes  How often do you have trouble taking your medications the way you have been told to take them?: I always take them as prescribed.     Do you have Home O2 Therapy?: No      Ability to seek help/take action for Emergent Urgent situations i.e. fire, crime, inclement weather or health crisis.: Independent  Ability to ambulate to restroom: Independent  Ability handle personal hygeine needs (bathing/dressing/grooming): Independent  Ability to manage Medications: Independent  Ability to prepare Food Preparation: Independent  Ability to maintain home (clean home, laundry):  Stable

## 2024-03-25 NOTE — PHYSICAL THERAPY INITIAL EVALUATION ADULT - LONG TERM MEMORY, REHAB EVAL
-- DO NOT REPLY / DO NOT REPLY ALL --  -- Message is from Engagement Center Operations (ECO) --    General Patient Message: Caller needs to discuss the Prednisone taper patient also has an E message on this as well. Please advise, thanks.    Caller Information         Type Contact Phone/Fax    03/25/2024 04:10 PM CDT Phone (Incoming) Iliana Moody (Self) [phone] (M)          Alternative phone number:     Can a detailed message be left? Yes    Message Turnaround:                intact

## 2024-06-13 NOTE — ED ADULT NURSE NOTE - NSFALLRSKINDICTYPE_ED_ALL_ED
Provider Staff:  Action required for this patient    Requires labs      Please see care gap opportunities below in Care Due Message.    Thanks!  [facility]     Appointments      Date Provider   Last Visit   8/14/2023 Bertha Kraft MD   Next Visit   8/22/2024 Bertha Kraft MD     Refill Decision Note   Jennifer Bajwa  is requesting a refill authorization.  Brief Assessment and Rationale for Refill:  Approve     Medication Therapy Plan:         Comments:     Note composed:10:22 AM 06/13/2024             Need for Mobility Assisted Device
